# Patient Record
Sex: FEMALE | Race: OTHER | NOT HISPANIC OR LATINO | URBAN - METROPOLITAN AREA
[De-identification: names, ages, dates, MRNs, and addresses within clinical notes are randomized per-mention and may not be internally consistent; named-entity substitution may affect disease eponyms.]

---

## 2020-12-20 ENCOUNTER — INPATIENT (INPATIENT)
Facility: HOSPITAL | Age: 85
LOS: 7 days | Discharge: HOME | DRG: 683 | End: 2020-12-28
Attending: GENERAL PRACTICE | Admitting: GENERAL PRACTICE
Payer: MEDICARE

## 2020-12-20 VITALS — HEART RATE: 87 BPM | HEIGHT: 62 IN | WEIGHT: 110.01 LBS

## 2020-12-20 DIAGNOSIS — E87.5 HYPERKALEMIA: ICD-10-CM

## 2020-12-20 LAB
ALBUMIN SERPL ELPH-MCNC: 3.8 G/DL — SIGNIFICANT CHANGE UP (ref 3.3–5)
ALBUMIN SERPL ELPH-MCNC: 4.4 G/DL — SIGNIFICANT CHANGE UP (ref 3.3–5)
ALP SERPL-CCNC: 47 U/L — SIGNIFICANT CHANGE UP (ref 40–120)
ALP SERPL-CCNC: 52 U/L — SIGNIFICANT CHANGE UP (ref 40–120)
ALT FLD-CCNC: 128 U/L — HIGH (ref 10–45)
ALT FLD-CCNC: 190 U/L — HIGH (ref 10–45)
ANION GAP SERPL CALC-SCNC: 15 MMOL/L — SIGNIFICANT CHANGE UP (ref 5–17)
ANION GAP SERPL CALC-SCNC: 17 MMOL/L — SIGNIFICANT CHANGE UP (ref 5–17)
ANION GAP SERPL CALC-SCNC: 17 MMOL/L — SIGNIFICANT CHANGE UP (ref 5–17)
ANION GAP SERPL CALC-SCNC: 18 MMOL/L — HIGH (ref 5–17)
APPEARANCE UR: ABNORMAL
AST SERPL-CCNC: 122 U/L — HIGH (ref 10–40)
AST SERPL-CCNC: 178 U/L — HIGH (ref 10–40)
BACTERIA # UR AUTO: NEGATIVE — SIGNIFICANT CHANGE UP
BASE EXCESS BLDV CALC-SCNC: -5.7 MMOL/L — LOW (ref -2–2)
BASOPHILS # BLD AUTO: 0 K/UL — SIGNIFICANT CHANGE UP (ref 0–0.2)
BASOPHILS NFR BLD AUTO: 0 % — SIGNIFICANT CHANGE UP (ref 0–2)
BILIRUB SERPL-MCNC: 1 MG/DL — SIGNIFICANT CHANGE UP (ref 0.2–1.2)
BILIRUB SERPL-MCNC: 1.3 MG/DL — HIGH (ref 0.2–1.2)
BILIRUB UR-MCNC: NEGATIVE — SIGNIFICANT CHANGE UP
BUN SERPL-MCNC: 66 MG/DL — HIGH (ref 7–23)
BUN SERPL-MCNC: 67 MG/DL — HIGH (ref 7–23)
BUN SERPL-MCNC: 68 MG/DL — HIGH (ref 7–23)
BUN SERPL-MCNC: 69 MG/DL — HIGH (ref 7–23)
CA-I SERPL-SCNC: 1.12 MMOL/L — SIGNIFICANT CHANGE UP (ref 1.12–1.3)
CALCIUM SERPL-MCNC: 10.4 MG/DL — SIGNIFICANT CHANGE UP (ref 8.4–10.5)
CALCIUM SERPL-MCNC: 9.3 MG/DL — SIGNIFICANT CHANGE UP (ref 8.4–10.5)
CALCIUM SERPL-MCNC: 9.3 MG/DL — SIGNIFICANT CHANGE UP (ref 8.4–10.5)
CALCIUM SERPL-MCNC: 9.9 MG/DL — SIGNIFICANT CHANGE UP (ref 8.4–10.5)
CHLORIDE BLDV-SCNC: 78 MMOL/L — LOW (ref 96–108)
CHLORIDE SERPL-SCNC: 75 MMOL/L — LOW (ref 96–108)
CHLORIDE SERPL-SCNC: 76 MMOL/L — LOW (ref 96–108)
CHLORIDE SERPL-SCNC: 76 MMOL/L — LOW (ref 96–108)
CHLORIDE SERPL-SCNC: 80 MMOL/L — LOW (ref 96–108)
CO2 BLDV-SCNC: 20 MMOL/L — LOW (ref 22–30)
CO2 SERPL-SCNC: 16 MMOL/L — LOW (ref 22–31)
CO2 SERPL-SCNC: 16 MMOL/L — LOW (ref 22–31)
CO2 SERPL-SCNC: 17 MMOL/L — LOW (ref 22–31)
CO2 SERPL-SCNC: 18 MMOL/L — LOW (ref 22–31)
COLOR SPEC: YELLOW — SIGNIFICANT CHANGE UP
CREAT SERPL-MCNC: 2.69 MG/DL — HIGH (ref 0.5–1.3)
CREAT SERPL-MCNC: 2.81 MG/DL — HIGH (ref 0.5–1.3)
CREAT SERPL-MCNC: 2.85 MG/DL — HIGH (ref 0.5–1.3)
CREAT SERPL-MCNC: 2.86 MG/DL — HIGH (ref 0.5–1.3)
DIFF PNL FLD: NEGATIVE — SIGNIFICANT CHANGE UP
EOSINOPHIL # BLD AUTO: 0 K/UL — SIGNIFICANT CHANGE UP (ref 0–0.5)
EOSINOPHIL NFR BLD AUTO: 0 % — SIGNIFICANT CHANGE UP (ref 0–6)
EPI CELLS # UR: 3 /HPF — SIGNIFICANT CHANGE UP
GAS PNL BLDV: 106 MMOL/L — CRITICAL LOW (ref 135–145)
GAS PNL BLDV: SIGNIFICANT CHANGE UP
GLUCOSE BLDC GLUCOMTR-MCNC: 184 MG/DL — HIGH (ref 70–99)
GLUCOSE BLDV-MCNC: 197 MG/DL — HIGH (ref 70–99)
GLUCOSE SERPL-MCNC: 171 MG/DL — HIGH (ref 70–99)
GLUCOSE SERPL-MCNC: 175 MG/DL — HIGH (ref 70–99)
GLUCOSE SERPL-MCNC: 210 MG/DL — HIGH (ref 70–99)
GLUCOSE SERPL-MCNC: 231 MG/DL — HIGH (ref 70–99)
GLUCOSE UR QL: NEGATIVE — SIGNIFICANT CHANGE UP
HCO3 BLDV-SCNC: 19 MMOL/L — LOW (ref 21–29)
HCT VFR BLD CALC: 23.7 % — LOW (ref 34.5–45)
HCT VFR BLDA CALC: 27 % — LOW (ref 39–50)
HGB BLD CALC-MCNC: 8.6 G/DL — LOW (ref 11.5–15.5)
HGB BLD-MCNC: 8.5 G/DL — LOW (ref 11.5–15.5)
HYALINE CASTS # UR AUTO: 10 /LPF — HIGH (ref 0–2)
KETONES UR-MCNC: NEGATIVE — SIGNIFICANT CHANGE UP
LACTATE BLDV-MCNC: 2.9 MMOL/L — HIGH (ref 0.7–2)
LEUKOCYTE ESTERASE UR-ACNC: NEGATIVE — SIGNIFICANT CHANGE UP
LIDOCAIN IGE QN: 119 U/L — HIGH (ref 7–60)
LYMPHOCYTES # BLD AUTO: 0.64 K/UL — LOW (ref 1–3.3)
LYMPHOCYTES # BLD AUTO: 12.2 % — LOW (ref 13–44)
MAGNESIUM SERPL-MCNC: 2.3 MG/DL — SIGNIFICANT CHANGE UP (ref 1.6–2.6)
MCHC RBC-ENTMCNC: 33.6 PG — SIGNIFICANT CHANGE UP (ref 27–34)
MCHC RBC-ENTMCNC: 35.9 GM/DL — SIGNIFICANT CHANGE UP (ref 32–36)
MCV RBC AUTO: 93.7 FL — SIGNIFICANT CHANGE UP (ref 80–100)
MONOCYTES # BLD AUTO: 0.23 K/UL — SIGNIFICANT CHANGE UP (ref 0–0.9)
MONOCYTES NFR BLD AUTO: 4.4 % — SIGNIFICANT CHANGE UP (ref 2–14)
NEUTROPHILS # BLD AUTO: 4.39 K/UL — SIGNIFICANT CHANGE UP (ref 1.8–7.4)
NEUTROPHILS NFR BLD AUTO: 81.7 % — HIGH (ref 43–77)
NITRITE UR-MCNC: NEGATIVE — SIGNIFICANT CHANGE UP
NT-PROBNP SERPL-SCNC: HIGH PG/ML (ref 0–300)
OSMOLALITY UR: 274 MOSM/KG — SIGNIFICANT CHANGE UP (ref 50–1200)
PCO2 BLDV: 38 MMHG — SIGNIFICANT CHANGE UP (ref 35–50)
PH BLDV: 7.32 — LOW (ref 7.35–7.45)
PH UR: 6 — SIGNIFICANT CHANGE UP (ref 5–8)
PHOSPHATE SERPL-MCNC: 5.2 MG/DL — HIGH (ref 2.5–4.5)
PLATELET # BLD AUTO: 82 K/UL — LOW (ref 150–400)
PO2 BLDV: <20 MMHG — LOW (ref 25–45)
POTASSIUM BLDV-SCNC: 6.6 MMOL/L — CRITICAL HIGH (ref 3.5–5.3)
POTASSIUM SERPL-MCNC: 5.5 MMOL/L — HIGH (ref 3.5–5.3)
POTASSIUM SERPL-MCNC: 5.7 MMOL/L — HIGH (ref 3.5–5.3)
POTASSIUM SERPL-MCNC: 6.1 MMOL/L — HIGH (ref 3.5–5.3)
POTASSIUM SERPL-MCNC: 6.9 MMOL/L — CRITICAL HIGH (ref 3.5–5.3)
POTASSIUM SERPL-SCNC: 5.5 MMOL/L — HIGH (ref 3.5–5.3)
POTASSIUM SERPL-SCNC: 5.7 MMOL/L — HIGH (ref 3.5–5.3)
POTASSIUM SERPL-SCNC: 6.1 MMOL/L — HIGH (ref 3.5–5.3)
POTASSIUM SERPL-SCNC: 6.9 MMOL/L — CRITICAL HIGH (ref 3.5–5.3)
PROT SERPL-MCNC: 6 G/DL — SIGNIFICANT CHANGE UP (ref 6–8.3)
PROT SERPL-MCNC: 6.4 G/DL — SIGNIFICANT CHANGE UP (ref 6–8.3)
PROT UR-MCNC: ABNORMAL
RBC # BLD: 2.53 M/UL — LOW (ref 3.8–5.2)
RBC # FLD: 13.3 % — SIGNIFICANT CHANGE UP (ref 10.3–14.5)
RBC CASTS # UR COMP ASSIST: 3 /HPF — SIGNIFICANT CHANGE UP (ref 0–4)
SAO2 % BLDV: 18 % — LOW (ref 67–88)
SARS-COV-2 RNA SPEC QL NAA+PROBE: SIGNIFICANT CHANGE UP
SODIUM SERPL-SCNC: 108 MMOL/L — CRITICAL LOW (ref 135–145)
SODIUM SERPL-SCNC: 108 MMOL/L — CRITICAL LOW (ref 135–145)
SODIUM SERPL-SCNC: 111 MMOL/L — CRITICAL LOW (ref 135–145)
SODIUM SERPL-SCNC: 115 MMOL/L — CRITICAL LOW (ref 135–145)
SODIUM UR-SCNC: <35 MMOL/L — SIGNIFICANT CHANGE UP
SP GR SPEC: 1.01 — SIGNIFICANT CHANGE UP (ref 1.01–1.02)
TROPONIN T, HIGH SENSITIVITY RESULT: 196 NG/L — HIGH (ref 0–51)
UROBILINOGEN FLD QL: NEGATIVE — SIGNIFICANT CHANGE UP
WBC # BLD: 5.26 K/UL — SIGNIFICANT CHANGE UP (ref 3.8–10.5)
WBC # FLD AUTO: 5.26 K/UL — SIGNIFICANT CHANGE UP (ref 3.8–10.5)
WBC UR QL: 0 /HPF — SIGNIFICANT CHANGE UP (ref 0–5)

## 2020-12-20 PROCEDURE — 93010 ELECTROCARDIOGRAM REPORT: CPT | Mod: GC

## 2020-12-20 PROCEDURE — 93010 ELECTROCARDIOGRAM REPORT: CPT

## 2020-12-20 PROCEDURE — 71045 X-RAY EXAM CHEST 1 VIEW: CPT | Mod: 26

## 2020-12-20 PROCEDURE — 99291 CRITICAL CARE FIRST HOUR: CPT | Mod: GC

## 2020-12-20 RX ORDER — DEXTROSE 50 % IN WATER 50 %
12.5 SYRINGE (ML) INTRAVENOUS ONCE
Refills: 0 | Status: DISCONTINUED | OUTPATIENT
Start: 2020-12-20 | End: 2020-12-28

## 2020-12-20 RX ORDER — GLUCAGON INJECTION, SOLUTION 0.5 MG/.1ML
1 INJECTION, SOLUTION SUBCUTANEOUS ONCE
Refills: 0 | Status: DISCONTINUED | OUTPATIENT
Start: 2020-12-20 | End: 2020-12-28

## 2020-12-20 RX ORDER — SODIUM CHLORIDE 5 G/100ML
500 INJECTION, SOLUTION INTRAVENOUS
Refills: 0 | Status: DISCONTINUED | OUTPATIENT
Start: 2020-12-20 | End: 2020-12-20

## 2020-12-20 RX ORDER — SODIUM POLYSTYRENE SULFONATE 4.1 MEQ/G
30 POWDER, FOR SUSPENSION ORAL EVERY 6 HOURS
Refills: 0 | Status: DISCONTINUED | OUTPATIENT
Start: 2020-12-20 | End: 2020-12-20

## 2020-12-20 RX ORDER — SODIUM CHLORIDE 9 MG/ML
1000 INJECTION INTRAMUSCULAR; INTRAVENOUS; SUBCUTANEOUS
Refills: 0 | Status: DISCONTINUED | OUTPATIENT
Start: 2020-12-20 | End: 2020-12-21

## 2020-12-20 RX ORDER — CALCIUM GLUCONATE 100 MG/ML
1 VIAL (ML) INTRAVENOUS ONCE
Refills: 0 | Status: COMPLETED | OUTPATIENT
Start: 2020-12-20 | End: 2020-12-20

## 2020-12-20 RX ORDER — DEXTROSE 50 % IN WATER 50 %
25 SYRINGE (ML) INTRAVENOUS ONCE
Refills: 0 | Status: DISCONTINUED | OUTPATIENT
Start: 2020-12-20 | End: 2020-12-28

## 2020-12-20 RX ORDER — SODIUM ZIRCONIUM CYCLOSILICATE 10 G/10G
10 POWDER, FOR SUSPENSION ORAL ONCE
Refills: 0 | Status: COMPLETED | OUTPATIENT
Start: 2020-12-20 | End: 2020-12-20

## 2020-12-20 RX ORDER — DEXTROSE 50 % IN WATER 50 %
50 SYRINGE (ML) INTRAVENOUS ONCE
Refills: 0 | Status: COMPLETED | OUTPATIENT
Start: 2020-12-20 | End: 2020-12-20

## 2020-12-20 RX ORDER — INSULIN HUMAN 100 [IU]/ML
5 INJECTION, SOLUTION SUBCUTANEOUS ONCE
Refills: 0 | Status: COMPLETED | OUTPATIENT
Start: 2020-12-20 | End: 2020-12-20

## 2020-12-20 RX ORDER — SODIUM CHLORIDE 9 MG/ML
1 INJECTION INTRAMUSCULAR; INTRAVENOUS; SUBCUTANEOUS
Refills: 0 | Status: DISCONTINUED | OUTPATIENT
Start: 2020-12-20 | End: 2020-12-20

## 2020-12-20 RX ORDER — INSULIN HUMAN 100 [IU]/ML
10 INJECTION, SOLUTION SUBCUTANEOUS ONCE
Refills: 0 | Status: COMPLETED | OUTPATIENT
Start: 2020-12-20 | End: 2020-12-20

## 2020-12-20 RX ORDER — SODIUM ZIRCONIUM CYCLOSILICATE 10 G/10G
10 POWDER, FOR SUSPENSION ORAL EVERY 8 HOURS
Refills: 0 | Status: DISCONTINUED | OUTPATIENT
Start: 2020-12-20 | End: 2020-12-20

## 2020-12-20 RX ORDER — SODIUM CHLORIDE 9 MG/ML
1000 INJECTION, SOLUTION INTRAVENOUS
Refills: 0 | Status: DISCONTINUED | OUTPATIENT
Start: 2020-12-20 | End: 2020-12-28

## 2020-12-20 RX ORDER — CARVEDILOL PHOSPHATE 80 MG/1
3.12 CAPSULE, EXTENDED RELEASE ORAL EVERY 12 HOURS
Refills: 0 | Status: DISCONTINUED | OUTPATIENT
Start: 2020-12-20 | End: 2020-12-22

## 2020-12-20 RX ORDER — SODIUM ZIRCONIUM CYCLOSILICATE 10 G/10G
10 POWDER, FOR SUSPENSION ORAL EVERY 8 HOURS
Refills: 0 | Status: COMPLETED | OUTPATIENT
Start: 2020-12-20 | End: 2020-12-21

## 2020-12-20 RX ORDER — HEPARIN SODIUM 5000 [USP'U]/ML
5000 INJECTION INTRAVENOUS; SUBCUTANEOUS EVERY 12 HOURS
Refills: 0 | Status: DISCONTINUED | OUTPATIENT
Start: 2020-12-20 | End: 2020-12-21

## 2020-12-20 RX ORDER — FAMOTIDINE 10 MG/ML
20 INJECTION INTRAVENOUS DAILY
Refills: 0 | Status: DISCONTINUED | OUTPATIENT
Start: 2020-12-20 | End: 2020-12-28

## 2020-12-20 RX ORDER — DEXTROSE 50 % IN WATER 50 %
15 SYRINGE (ML) INTRAVENOUS ONCE
Refills: 0 | Status: DISCONTINUED | OUTPATIENT
Start: 2020-12-20 | End: 2020-12-28

## 2020-12-20 RX ORDER — INSULIN LISPRO 100/ML
VIAL (ML) SUBCUTANEOUS
Refills: 0 | Status: DISCONTINUED | OUTPATIENT
Start: 2020-12-20 | End: 2020-12-28

## 2020-12-20 RX ADMIN — Medication 50 MILLILITER(S): at 19:42

## 2020-12-20 RX ADMIN — SODIUM CHLORIDE 60 MILLILITER(S): 9 INJECTION INTRAMUSCULAR; INTRAVENOUS; SUBCUTANEOUS at 23:27

## 2020-12-20 RX ADMIN — Medication 100 GRAM(S): at 11:33

## 2020-12-20 RX ADMIN — INSULIN HUMAN 5 UNIT(S): 100 INJECTION, SOLUTION SUBCUTANEOUS at 11:33

## 2020-12-20 RX ADMIN — SODIUM CHLORIDE 1 GRAM(S): 9 INJECTION INTRAMUSCULAR; INTRAVENOUS; SUBCUTANEOUS at 18:38

## 2020-12-20 RX ADMIN — SODIUM ZIRCONIUM CYCLOSILICATE 10 GRAM(S): 10 POWDER, FOR SUSPENSION ORAL at 11:46

## 2020-12-20 RX ADMIN — SODIUM ZIRCONIUM CYCLOSILICATE 10 GRAM(S): 10 POWDER, FOR SUSPENSION ORAL at 20:46

## 2020-12-20 RX ADMIN — Medication 100 GRAM(S): at 19:43

## 2020-12-20 RX ADMIN — Medication 50 MILLILITER(S): at 11:33

## 2020-12-20 RX ADMIN — SODIUM CHLORIDE 100 MILLILITER(S): 5 INJECTION, SOLUTION INTRAVENOUS at 16:35

## 2020-12-20 RX ADMIN — INSULIN HUMAN 10 UNIT(S): 100 INJECTION, SOLUTION SUBCUTANEOUS at 19:42

## 2020-12-20 RX ADMIN — Medication 100 GRAM(S): at 14:57

## 2020-12-20 NOTE — ED ADULT NURSE NOTE - OBJECTIVE STATEMENT
PT presents via EMS sent by family for weakness and lethargy at home, per EMS pt bradycardic, recd atropine 0.5 mg en route, Mandarin speaking,  [id number] used , pt had difficulty using translation service, pt [place], per family pt can only hear with R ear. Per family, pt has been reporting chest pain, pt has been taking nitro tablets at home with no relief. Primary history gained from family via phone,  Pt bradycardi on arrival on telemetry monitor, junctional rythym noted, pt denies pain at this time, breathing unlabored, symmetrical, no shortness of breath reported. No fevers or chills per family. Moving all extremities.

## 2020-12-20 NOTE — ED PROVIDER NOTE - CARE PLAN
Principal Discharge DX:	Hyperkalemia  Secondary Diagnosis:	Elevated troponin  Secondary Diagnosis:	Hyponatremia

## 2020-12-20 NOTE — H&P ADULT - PROBLEM SELECTOR PLAN 2
as above, improved post treatment   holding diuretics  hold ARB for now  nephrology consulted, pending  encourage PO hydration  monitor BMP closely

## 2020-12-20 NOTE — CONSULT NOTE ADULT - SUBJECTIVE AND OBJECTIVE BOX
CHIEF COMPLAINT:     HPI:  89 yo Mandarin Speaking Female with history of CHF, CKD III, DM, HTN BIBEMS from home for decreased lethargy, po intake, slower to respond and anuria since yesterday found to be bradycardic with junctional rhthym given atropine with adequate response. Patient found to be hyponatremic to 108, MICU consulted for severe hyponatremia. Patient with stable blood pressure with HR 53, /46,     PAST MEDICAL & SURGICAL HISTORY:      FAMILY HISTORY:      SOCIAL HISTORY:  Smoking: __ packs x ___ years  EtOH Use:  Marital Status:  Occupation:  Recent Travel:  Country of Birth:  Advance Directives:    Allergies    No Known Allergies    Intolerances        HOME MEDICATIONS:    REVIEW OF SYSTEMS:  Constitutional:   Eyes:  ENT:  CV:  Resp:  GI:  :  MSK:  Integumentary:  Neurological:  Psychiatric:  Endocrine:  Hematologic/Lymphatic:  Allergic/Immunologic:  [ ] All other systems negative  [ ] Unable to assess ROS because ________    OBJECTIVE:  ICU Vital Signs Last 24 Hrs  T(C): 36.9 (20 Dec 2020 11:02), Max: 36.9 (20 Dec 2020 11:02)  T(F): 98.5 (20 Dec 2020 11:02), Max: 98.5 (20 Dec 2020 11:02)  HR: 54 (20 Dec 2020 11:41) (50 - 87)  BP: 100/54 (20 Dec 2020 11:41) (100/54 - 151/48)  BP(mean): --  ABP: --  ABP(mean): --  RR: 19 (20 Dec 2020 11:41) (19 - 20)  SpO2: 99% (20 Dec 2020 11:41) (99% - 100%)        CAPILLARY BLOOD GLUCOSE      POCT Blood Glucose.: 292 mg/dL (20 Dec 2020 12:56)      PHYSICAL EXAM:  General:   HEENT:   Lymph Nodes:  Neck:   Respiratory:   Cardiovascular:   Abdomen:   Extremities:   Skin:   Neurological:  Psychiatry:    HOSPITAL MEDICATIONS:  MEDICATIONS  (STANDING):    MEDICATIONS  (PRN):      LABS:                        8.5    5.26  )-----------( 82       ( 20 Dec 2020 10:52 )             23.7     12-20    108<LL>  |  75<L>  |  66<H>  ----------------------------<  210<H>  6.9<HH>   |  16<L>  |  2.85<H>    Ca    9.3      20 Dec 2020 10:52  Phos  5.2     12-20  Mg     2.3     12-20    TPro  6.4  /  Alb  4.4  /  TBili  1.3<H>  /  DBili  x   /  AST  122<H>  /  ALT  128<H>  /  AlkPhos  52  12-20      Urinalysis Basic - ( 20 Dec 2020 11:50 )    Color: Yellow / Appearance: Slightly Turbid / S.012 / pH: x  Gluc: x / Ketone: Negative  / Bili: Negative / Urobili: Negative   Blood: x / Protein: 30 mg/dL / Nitrite: Negative   Leuk Esterase: Negative / RBC: 3 /hpf / WBC 0 /HPF   Sq Epi: x / Non Sq Epi: 3 /hpf / Bacteria: Negative        Venous Blood Gas:   @ 10:52  7.32/38/<20/19/18  VBG Lactate: 2.9      MICROBIOLOGY:     RADIOLOGY:  [ ] Reviewed and interpreted by me    EKG: CHIEF COMPLAINT:     HPI:  89 yo Mandarin Speaking hard of hearing Female with history of CHF, CKD III, DM, HTN BIBEMS from home for decreased lethargy, po intake, slower to respond and anuria since yesterday found to be bradycardic with junctional rhthym given atropine with adequate response. Patient found to be hyponatremic to 108, MICU consulted for severe hyponatremia. Patient with stable blood pressure with HR 53, /46, in no acute distress, ao3. Discussed with grand son [phone]. Patient is at baseline is largely quiet and only speaks when asked questions, she is usually able to ambulate with walker, able to take herself to the bathroom, lives with daughter and grandson, no previous seizure like activity or recent falls, or changes in medications. Patient has been on heavily restricted salt diet although not eating much at baseline. Patient was not reporting chest pain, , no cough, nausea, vomiting, diarrhea, dysuria, leg swelling, history of thyroid dysfxn. Denies recent sick contacts. Patient of note on glipizide, tradjenta, rampril, carvedill, spironolactone, torsemide, famotidine , nitroglycerin as needed    After long discussion with grandson patient is reportedly DNR/DNI and although open to being treated would not want invasive procedures such as central line placement done at this time. Unknown if [facility] completed    cardiologist and PCP office closed and despite calling on call physician unable to obtain patient's prior records    PAST MEDICAL & SURGICAL HISTORY: remote history of ankle surgery      FAMILY HISTORY: heart disease      SOCIAL HISTORY:  Smoking: none  EtOH Use: none  Marital Status:  Occupation:  Recent Travel:  Country of Birth:  Advance Directives:    Allergies    No Known Allergies    Intolerances        HOME MEDICATIONS: glipizide, tradjenta, rampril, carvedill, spironolactone, torsemide, famotidine , nitroglycerin as needed    REVIEW OF SYSTEMS:  see hpi    OBJECTIVE:  ICU Vital Signs Last 24 Hrs  T(C): 36.9 (20 Dec 2020 11:02), Max: 36.9 (20 Dec 2020 11:02)  T(F): 98.5 (20 Dec 2020 11:02), Max: 98.5 (20 Dec 2020 11:02)  HR: 54 (20 Dec 2020 11:41) (50 - 87)  BP: 100/54 (20 Dec 2020 11:41) (100/54 - 151/48)  BP(mean): --  ABP: --  ABP(mean): --  RR: 19 (20 Dec 2020 11:41) (19 - 20)  SpO2: 99% (20 Dec 2020 11:41) (99% - 100%)        CAPILLARY BLOOD GLUCOSE      POCT Blood Glucose.: 292 mg/dL (20 Dec 2020 12:56)      PHYSICAL EXAM:  GENERAL: NAD, lying comfortably in bed   HEAD:  Atraumatic, Normocephalic  EYES: EOMI b/l, PERRLA b/l, conjunctiva and sclera clear  NECK: Supple, No JVD, No LAD   CHEST/LUNG: Clear to auscultation bilaterally; No wheeze or ronchi  HEART: Regular rate and rhythm; S1 and S2 present, No murmurs, rubs, or gallops  ABDOMEN: Soft, Nontender, Nondistended; Bowel sounds present  EXTREMITIES:  2+ Peripheral Pulses, No clubbing, cyanosis, or edema  NEURO: AAOx3, non-focal   SKIN: No rashes or lesions      HOSPITAL MEDICATIONS:  MEDICATIONS  (STANDING):    MEDICATIONS  (PRN):      LABS:                        8.5    5.26  )-----------( 82       ( 20 Dec 2020 10:52 )             23.7     12-20    108<LL>  |  75<L>  |  66<H>  ----------------------------<  210<H>  6.9<HH>   |  16<L>  |  2.85<H>    Ca    9.3      20 Dec 2020 10:52  Phos  5.2     12-20  Mg     2.3     12-20    TPro  6.4  /  Alb  4.4  /  TBili  1.3<H>  /  DBili  x   /  AST  122<H>  /  ALT  128<H>  /  AlkPhos  52  12-20      Urinalysis Basic - ( 20 Dec 2020 11:50 )    Color: Yellow / Appearance: Slightly Turbid / S.012 / pH: x  Gluc: x / Ketone: Negative  / Bili: Negative / Urobili: Negative   Blood: x / Protein: 30 mg/dL / Nitrite: Negative   Leuk Esterase: Negative / RBC: 3 /hpf / WBC 0 /HPF   Sq Epi: x / Non Sq Epi: 3 /hpf / Bacteria: Negative        Venous Blood Gas:  12-20 @ 10:52  7.32/38/<20/19/18  VBG Lactate: 2.9      MICROBIOLOGY:     RADIOLOGY:  [ ] Reviewed and interpreted by me    EKG: wide qrs, 53 bpm, LBBB

## 2020-12-20 NOTE — ED ADULT TRIAGE NOTE - CHIEF COMPLAINT QUOTE
bradycardia, given atropine en route bradycardia, given atropine en route by EMS. Pt brought directly to cc 26. bradycardia to 40's, given atropine en route by EMS. Pt brought directly to cc 26.

## 2020-12-20 NOTE — ED PROVIDER NOTE - ATTENDING CONTRIBUTION TO CARE
pt is 89 yrs old female from home biba mandarin speaking heard of hearing at baseline mental status, but weakness for the past few days, not making urine, sob, family concerned for chf? givern her furosemide and ntg?  no cp or back pain, no fever or chills, ekg with sb with lbbb, on cr monitor, solis placement, ivf, cardiac work up, infectious and metabolic work up.

## 2020-12-20 NOTE — H&P ADULT - HISTORY OF PRESENT ILLNESS
>>>>>>Medical Attending Initial / Admission note<<<<<<  -------------------------------------------------------------------------------  CHIEF COMPLAINT & HISTORY OF PRESENT ILLNESS:      pt poor historian ( and [place], despite interpretor use) , family not available, info per ED chart   Pt is a 89 y/o woman, reportedly A&OX3, with pmh of ckd, htn, chf, hld, htn, dm, pw bradycardia and lethargy.   reports pt has been complaining of weakness, malaise, mild cp and sob. pt has been receiving diuretics and nitro from family. ems found pt to have bradycardia in 40s, junctional, received .5 atropine w/ improvement to 70s. denies f/c.   per family pt did not have formal MOLST however pts HCP stated to ER pt is dnr/dni ( no order placed: needs to be confirmed...)     --------------------------------------------------------------------------------  PAST MEDICAL HISTORY:    ckd  htn  chf  hld  htn  dm    --------------------------------------------------------------------------------  PAST SURGICAL HISTORY:    none known     --------------------------------------------------------------------------------  FAMILY HISTORY:    N/C     --------------------------------------------------------------------------------  SOCIAL HISTORY:  Alcohol: None reported  Smoking: None reported     --------------------------------------------------------------------------------  ALLERGIES:    [As bellow, reviewed]    --------------------------------------------------------------------------------  MEDICATIONS:   [As bellow, reviewed]    --------------------------------------------------------------------------------  REVIEW OF SYSTEM:    limited ROS  no c/o  asking to turn off the light to go to sleep !     --------------------------------------------------------------------------------  VITAL SIGNS:     Height (cm): 157.5  Weight (kg): 49.9  BMI (kg/m2): 20.1  Vital Signs Last 24 HrsT(C): 36.6 (12-20-20 @ 21:46)  T(F): 97.9 (12-20-20 @ 21:46), Max: 98.5 (12-20-20 @ 11:02)  HR: 72 (12-20-20 @ 21:46) (42 - 87)  BP: 138/77 (12-20-20 @ 21:46)  RR: 18 (12-20-20 @ 21:46) (18 - 20)  SpO2: 98% (12-20-20 @ 21:46) (98% - 100%)      POCT Blood Glucose.: 184 mg/dL (20 Dec 2020 17:26)  POCT Blood Glucose.: 292 mg/dL (20 Dec 2020 12:56)  POCT Blood Glucose.: 213 mg/dL (20 Dec 2020 11:31)  POCT Blood Glucose.: 229 mg/dL (20 Dec 2020 10:26)    --------------------------------------------------------------------------------  PHYSICAL EXAM:    GEN: Aler and awake, NAD , comfortable in bed  HEENT: NCAT, PERRL, MMM, hard of hearing   Neck: supple , no JVD  CVS: S1S2 , regular , No M/R/G appreciated  PULM: CTA B/L,  no W/R/R appreciated  ABD.: soft. non tender, non distended,  bowel sounds present  Extrem: intact pulses , no edema   Derm: No rash , no ecchymoses  PSYCH : normal mood,  no delusion not anxious    --------------------------------------------------------------------------------  LAB AND IMAGING:   [As casey, reviewed]    --------------------------------------------------------------------------------  ASSESSMENT AND PLAN:   [As bellow]    --------------------  Case discussed with RN, NP  ___________________________  H. LETITIA Diaz.  Pager: [phone]

## 2020-12-20 NOTE — H&P ADULT - PROBLEM SELECTOR PLAN 1
BECKY on CKD ( unknown baseline)   pt post solis and IVH  Avoid nephrotoxic medications.  holding diuretics  hold ARB for now  nephrology consulted, pending  encourage PO hydration  monitor BMP closely

## 2020-12-20 NOTE — H&P ADULT - PROBLEM SELECTOR PLAN 6
pt on diuretics      ?h/o CHF    chec echo  holding diuretics and ARB for now   tele  cardio to follow

## 2020-12-20 NOTE — ED PROVIDER NOTE - PROGRESS NOTE DETAILS
sherman pgy3: Patient reassessed, NAD, non-toxic appearing. vss. eval by micu, not candidate at this time. endoresd to dr cartagena. potassium improved. will trend troponin.

## 2020-12-20 NOTE — ED PROVIDER NOTE - OBJECTIVE STATEMENT
89 yo f pmh ckd, htn, chf, hld, htn, dm?, pw bradycardia. mandarin speaking, did not communicate w/  phone. collateral obtained from pts son and daughter (hcp) via phone. reports pt has been complaining of weakness, malaise, mild cp and sob. pt has been receiving diuretics and nitro from family. ems found pt to have bradycardia in 40s, junctional, received .5 atropine w/ improvement to 70s. denies f/c.         daughter: May [phone] 89 yo f pmh ckd, htn, chf, hld, htn, dm?, pw bradycardia. mandarin speaking, did not communicate w/  phone. collateral obtained from pts son and daughter (hcp) via phone. reports pt has been complaining of weakness, malaise, mild cp and sob. pt has been receiving diuretics and nitro from family. ems found pt to have bradycardia in 40s, junctional, received .5 atropine w/ improvement to 70s. denies f/c. per family pt did not have formal MOLST however pts HCP states pt dnr/dni.        daughter: May [phone]

## 2020-12-20 NOTE — PROVIDER CONTACT NOTE (CRITICAL VALUE NOTIFICATION) - ASSESSMENT
pt A&Ox3 [place] mandarin speaking.BP stable no s/s of distress 1.5% sodium chlorine running as ordered.

## 2020-12-20 NOTE — H&P ADULT - PROBLEM SELECTOR PLAN 3
as above: improved post treatment  holding diuretics  hold ARB for now  nephrology consulted, pending  encourage PO hydration  monitor BMP closely

## 2020-12-20 NOTE — ED ADULT NURSE NOTE - NSIMPLEMENTINTERV_GEN_ALL_ED
Implemented All Fall with Harm Risk Interventions:  [place] to call system. Call bell, personal items and telephone within reach. Instruct patient to call for assistance. Room bathroom lighting operational. Non-slip footwear when patient is off stretcher. Physically safe environment: no spills, clutter or unnecessary equipment. Stretcher in lowest position, wheels locked, appropriate side rails in place. Provide visual cue, wrist band, yellow gown, etc. Monitor gait and stability. Monitor for mental status changes and reorient to person, place, and time. Review medications for side effects contributing to fall risk. Reinforce activity limits and safety measures with patient and family. Provide visual clues: red socks.

## 2020-12-20 NOTE — ED PROVIDER NOTE - PHYSICAL EXAMINATION
General: nad  HEENT: EOMI, PERRLA, normal mucosa, normal oropharynx, no lesions on the lips or on oral mucosa, normal external ear  Neck: supple, no lymphadenopathy, full range of motion, no nuchal rigidity  CV: RRR, normal S1 and S2 with no murmur, capillary refill less than two seconds, pp 2+  Resp: lungs CTA b/l, good aeration bilaterally, symmetric chest wall   Abd: non-distended, soft, non-tender  : no CVA tenderness  MSK: full range of motion, no cyanosis, no edema, no clubbing, no immobility  Neuro: CN II-XII grossly intact, muscle strength 5/5 in all extremities, moving all extremities

## 2020-12-20 NOTE — ED ADULT NURSE REASSESSMENT NOTE - NS ED NURSE REASSESS COMMENT FT1
MD Charles aware of pt. HR - 44 and /37. States no interventions at this time. Will cont. to monitor.

## 2020-12-20 NOTE — ED ADULT NURSE REASSESSMENT NOTE - NS ED NURSE REASSESS COMMENT FT1
Pt. HR 42 at this time and /33. MD Charles assessing pt. at this time. States to administer Calcium Gluconate IVPB. No further interventions at this time.

## 2020-12-21 DIAGNOSIS — E11.69 TYPE 2 DIABETES MELLITUS WITH OTHER SPECIFIED COMPLICATION: ICD-10-CM

## 2020-12-21 DIAGNOSIS — E87.1 HYPO-OSMOLALITY AND HYPONATREMIA: ICD-10-CM

## 2020-12-21 DIAGNOSIS — E87.5 HYPERKALEMIA: ICD-10-CM

## 2020-12-21 DIAGNOSIS — I50.9 HEART FAILURE, UNSPECIFIED: ICD-10-CM

## 2020-12-21 DIAGNOSIS — R00.1 BRADYCARDIA, UNSPECIFIED: ICD-10-CM

## 2020-12-21 DIAGNOSIS — N17.9 ACUTE KIDNEY FAILURE, UNSPECIFIED: ICD-10-CM

## 2020-12-21 LAB
A1C WITH ESTIMATED AVERAGE GLUCOSE RESULT: 5.4 % — SIGNIFICANT CHANGE UP (ref 4–5.6)
ALBUMIN SERPL ELPH-MCNC: 3.8 G/DL — SIGNIFICANT CHANGE UP (ref 3.3–5)
ALP SERPL-CCNC: 47 U/L — SIGNIFICANT CHANGE UP (ref 40–120)
ALT FLD-CCNC: 359 U/L — HIGH (ref 10–45)
ANION GAP SERPL CALC-SCNC: 15 MMOL/L — SIGNIFICANT CHANGE UP (ref 5–17)
AST SERPL-CCNC: 305 U/L — HIGH (ref 10–40)
BILIRUB SERPL-MCNC: 0.8 MG/DL — SIGNIFICANT CHANGE UP (ref 0.2–1.2)
BUN SERPL-MCNC: 58 MG/DL — HIGH (ref 7–23)
BUN SERPL-MCNC: 61 MG/DL — HIGH (ref 7–23)
BUN SERPL-MCNC: 63 MG/DL — HIGH (ref 7–23)
CALCIUM SERPL-MCNC: 10 MG/DL — SIGNIFICANT CHANGE UP (ref 8.4–10.5)
CALCIUM SERPL-MCNC: 10.1 MG/DL — SIGNIFICANT CHANGE UP (ref 8.4–10.5)
CALCIUM SERPL-MCNC: 9.7 MG/DL — SIGNIFICANT CHANGE UP (ref 8.4–10.5)
CHLORIDE SERPL-SCNC: 84 MMOL/L — LOW (ref 96–108)
CHLORIDE SERPL-SCNC: 85 MMOL/L — LOW (ref 96–108)
CHLORIDE SERPL-SCNC: 86 MMOL/L — LOW (ref 96–108)
CHOLEST SERPL-MCNC: 124 MG/DL — SIGNIFICANT CHANGE UP
CO2 SERPL-SCNC: 19 MMOL/L — LOW (ref 22–31)
CREAT SERPL-MCNC: 1.9 MG/DL — HIGH (ref 0.5–1.3)
CREAT SERPL-MCNC: 2.2 MG/DL — HIGH (ref 0.5–1.3)
CREAT SERPL-MCNC: 2.51 MG/DL — HIGH (ref 0.5–1.3)
CULTURE RESULTS: NO GROWTH — SIGNIFICANT CHANGE UP
ESTIMATED AVERAGE GLUCOSE: 108 MG/DL — SIGNIFICANT CHANGE UP (ref 68–114)
FERRITIN SERPL-MCNC: 5083 NG/ML — HIGH (ref 15–150)
FOLATE SERPL-MCNC: >20 NG/ML — SIGNIFICANT CHANGE UP
GLUCOSE BLDC GLUCOMTR-MCNC: 125 MG/DL — HIGH (ref 70–99)
GLUCOSE BLDC GLUCOMTR-MCNC: 161 MG/DL — HIGH (ref 70–99)
GLUCOSE BLDC GLUCOMTR-MCNC: 212 MG/DL — HIGH (ref 70–99)
GLUCOSE BLDC GLUCOMTR-MCNC: 255 MG/DL — HIGH (ref 70–99)
GLUCOSE SERPL-MCNC: 143 MG/DL — HIGH (ref 70–99)
GLUCOSE SERPL-MCNC: 173 MG/DL — HIGH (ref 70–99)
GLUCOSE SERPL-MCNC: 97 MG/DL — SIGNIFICANT CHANGE UP (ref 70–99)
HCT VFR BLD CALC: 20.1 % — CRITICAL LOW (ref 34.5–45)
HDLC SERPL-MCNC: 39 MG/DL — LOW
HGB BLD-MCNC: 7.2 G/DL — LOW (ref 11.5–15.5)
IRON SATN MFR SERPL: 188 UG/DL — HIGH (ref 30–160)
IRON SATN MFR SERPL: 72 % — HIGH (ref 14–50)
LACTATE SERPL-SCNC: 1.1 MMOL/L — SIGNIFICANT CHANGE UP (ref 0.7–2)
LIPID PNL WITH DIRECT LDL SERPL: 65 MG/DL — SIGNIFICANT CHANGE UP
MAGNESIUM SERPL-MCNC: 2.1 MG/DL — SIGNIFICANT CHANGE UP (ref 1.6–2.6)
MCHC RBC-ENTMCNC: 33.2 PG — SIGNIFICANT CHANGE UP (ref 27–34)
MCHC RBC-ENTMCNC: 35.8 GM/DL — SIGNIFICANT CHANGE UP (ref 32–36)
MCV RBC AUTO: 92.6 FL — SIGNIFICANT CHANGE UP (ref 80–100)
NON HDL CHOLESTEROL: 86 MG/DL — SIGNIFICANT CHANGE UP
NRBC # BLD: 0 /100 WBCS — SIGNIFICANT CHANGE UP (ref 0–0)
PHOSPHATE SERPL-MCNC: 4.6 MG/DL — HIGH (ref 2.5–4.5)
PLATELET # BLD AUTO: 48 K/UL — LOW (ref 150–400)
POTASSIUM SERPL-MCNC: 4.1 MMOL/L — SIGNIFICANT CHANGE UP (ref 3.5–5.3)
POTASSIUM SERPL-MCNC: 4.4 MMOL/L — SIGNIFICANT CHANGE UP (ref 3.5–5.3)
POTASSIUM SERPL-MCNC: 4.8 MMOL/L — SIGNIFICANT CHANGE UP (ref 3.5–5.3)
POTASSIUM SERPL-SCNC: 4.1 MMOL/L — SIGNIFICANT CHANGE UP (ref 3.5–5.3)
POTASSIUM SERPL-SCNC: 4.4 MMOL/L — SIGNIFICANT CHANGE UP (ref 3.5–5.3)
POTASSIUM SERPL-SCNC: 4.8 MMOL/L — SIGNIFICANT CHANGE UP (ref 3.5–5.3)
PREALB SERPL-MCNC: 24 MG/DL — SIGNIFICANT CHANGE UP (ref 20–40)
PROT SERPL-MCNC: 5.9 G/DL — LOW (ref 6–8.3)
RBC # BLD: 2.17 M/UL — LOW (ref 3.8–5.2)
RBC # FLD: 13.2 % — SIGNIFICANT CHANGE UP (ref 10.3–14.5)
SODIUM SERPL-SCNC: 118 MMOL/L — CRITICAL LOW (ref 135–145)
SODIUM SERPL-SCNC: 119 MMOL/L — CRITICAL LOW (ref 135–145)
SODIUM SERPL-SCNC: 120 MMOL/L — CRITICAL LOW (ref 135–145)
SODIUM UR-SCNC: <35 MMOL/L — SIGNIFICANT CHANGE UP
SPECIMEN SOURCE: SIGNIFICANT CHANGE UP
TIBC SERPL-MCNC: 260 UG/DL — SIGNIFICANT CHANGE UP (ref 220–430)
TRIGL SERPL-MCNC: 103 MG/DL — SIGNIFICANT CHANGE UP
TSH SERPL-MCNC: 4.73 UIU/ML — HIGH (ref 0.27–4.2)
UIBC SERPL-MCNC: 72 UG/DL — LOW (ref 110–370)
VIT B12 SERPL-MCNC: >2000 PG/ML — HIGH (ref 232–1245)
WBC # BLD: 3.07 K/UL — LOW (ref 3.8–10.5)
WBC # FLD AUTO: 3.07 K/UL — LOW (ref 3.8–10.5)

## 2020-12-21 PROCEDURE — 93306 TTE W/DOPPLER COMPLETE: CPT | Mod: 26

## 2020-12-21 PROCEDURE — 76700 US EXAM ABDOM COMPLETE: CPT | Mod: 26

## 2020-12-21 PROCEDURE — 99223 1ST HOSP IP/OBS HIGH 75: CPT | Mod: GC

## 2020-12-21 RX ORDER — SODIUM CHLORIDE 9 MG/ML
1000 INJECTION, SOLUTION INTRAVENOUS
Refills: 0 | Status: DISCONTINUED | OUTPATIENT
Start: 2020-12-21 | End: 2020-12-21

## 2020-12-21 RX ORDER — SODIUM CHLORIDE 9 MG/ML
1000 INJECTION, SOLUTION INTRAVENOUS
Refills: 0 | Status: DISCONTINUED | OUTPATIENT
Start: 2020-12-21 | End: 2020-12-22

## 2020-12-21 RX ADMIN — CARVEDILOL PHOSPHATE 3.12 MILLIGRAM(S): 80 CAPSULE, EXTENDED RELEASE ORAL at 17:29

## 2020-12-21 RX ADMIN — FAMOTIDINE 20 MILLIGRAM(S): 10 INJECTION INTRAVENOUS at 11:57

## 2020-12-21 RX ADMIN — Medication 2: at 14:43

## 2020-12-21 RX ADMIN — Medication 3: at 17:29

## 2020-12-21 RX ADMIN — SODIUM CHLORIDE 200 MILLILITER(S): 9 INJECTION, SOLUTION INTRAVENOUS at 14:43

## 2020-12-21 RX ADMIN — SODIUM ZIRCONIUM CYCLOSILICATE 10 GRAM(S): 10 POWDER, FOR SUSPENSION ORAL at 05:27

## 2020-12-21 RX ADMIN — SODIUM CHLORIDE 100 MILLILITER(S): 9 INJECTION, SOLUTION INTRAVENOUS at 08:33

## 2020-12-21 RX ADMIN — CARVEDILOL PHOSPHATE 3.12 MILLIGRAM(S): 80 CAPSULE, EXTENDED RELEASE ORAL at 05:26

## 2020-12-21 RX ADMIN — HEPARIN SODIUM 5000 UNIT(S): 5000 INJECTION INTRAVENOUS; SUBCUTANEOUS at 05:26

## 2020-12-21 RX ADMIN — Medication 1 TABLET(S): at 11:57

## 2020-12-21 NOTE — PROGRESS NOTE ADULT - ASSESSMENT
_________________________________________________________________________________________  ========>>  M E D I C A L   A T T E N D I N G    F O L L O W  U P  N O T E  <<=========  -----------------------------------------------------------------------------------------------------    - Patient seen and examined by me earlier today.   - In summary,  JEANNA LUNA is a 89y year old woman admitted with lethargy  - Patient today overall doing ok, comfortable, eating OK. asking for lotion ( d/w PCA)..     ==================>> REVIEW OF SYSTEM <<=================    limited ROS as pt [place] and language barrier   GEN: no pain  RESP: no SOB, no cough  CVS: no chest pain  GI: no abdominal pain  : no dysuria  Neuro: no headache    ==================>> PHYSICAL EXAM <<=================    GEN: Alert and responsive, , NAD , comfortable in bed  HEENT: NCAT, PERRL, MMM, hearing intact  Neck: supple , no JVD appreciated  CVS: S1S2 , regular , No M/R/G appreciated   PULM: CTA B/L,  no W/R/R appreciated  ABD.: soft. non tender, non distended,  bowel sounds present  Extrem: intact pulses , no edema      + solis in place and draining clear urine   PSYCH : normal mood,  not anxious      ==================>> MEDICATIONS <<====================    MEDICATIONS  (STANDING):  carvedilol 3.125 milliGRAM(s) Oral every 12 hours  dextrose 40% Gel 15 Gram(s) Oral once  dextrose 5%. 1000 milliLiter(s) (50 mL/Hr) IV Continuous <Continuous>  dextrose 5%. 1000 milliLiter(s) (100 mL/Hr) IV Continuous <Continuous>  dextrose 5%. 1000 milliLiter(s) (100 mL/Hr) IV Continuous <Continuous>  dextrose 50% Injectable 25 Gram(s) IV Push once  dextrose 50% Injectable 12.5 Gram(s) IV Push once  dextrose 50% Injectable 25 Gram(s) IV Push once  famotidine    Tablet 20 milliGRAM(s) Oral daily  glucagon  Injectable 1 milliGRAM(s) IntraMuscular once  insulin lispro (ADMELOG) corrective regimen sliding scale   SubCutaneous three times a day before meals  multivitamin 1 Tablet(s) Oral daily    ___________  Active diet:  Diet, Regular:   Consistent Carbohydrate Evening Snack (CSTCHOSN)  Supplement Feeding Modality:  Oral  Glucerna Shake Cans or Servings Per Day:  1       Frequency:  Daily  Nepro Cans or Servings Per Day:  1       Frequency:  Daily  ___________________    ==================>> VITAL SIGNS <<==================    T(C): 36.5 (20 @ 05:20), Max: 36.6 (20 @ 21:46)  HR: 78 (20 @ 05:20) (42 - 78)  BP: 105/59 (20 @ 05:20) (105/59 - 138/77)  BP(mean): 55 (20 @ 14:57)  RR: 18 (20 @ 07:38) (18 - 20)  SpO2: 99% (20 @ 07:38) (98% - 100%)     POCT Blood Glucose.: 125 mg/dL (21 Dec 2020 08:35)  POCT Blood Glucose.: 184 mg/dL (20 Dec 2020 17:26)  POCT Blood Glucose.: 292 mg/dL (20 Dec 2020 12:56)    I&O's Summary    20 Dec 2020 07:01  -  21 Dec 2020 07:00  --------------------------------------------------------  IN: 480 mL / OUT: 1450 mL / NET: -970 mL     ==================>> LAB AND IMAGING <<==================                        7.2    3.07  )-----------( 48       ( 21 Dec 2020 05:36 )             20.1     WBC count:   3.07 <<== ,  5.26 <<==   Hemoglobin:   7.2 <<==,  8.5 <<==  platelets:  48 <==, 82 <==       12-21    119<LL>  |  85<L>  |  63<H>  ----------------------------<  97  4.8   |  19<L>  |  2.51<H>    Sodium:   119  <==, 115  <==, 111  <==, 108  <==, 108  <==  Creatinine:  2.51  <<==, 2.69  <<==, 2.86  <<==, 2.81  <<==, 2.85  <<==    Ca    10.1      21 Dec 2020 05:36  Phos  4.6     12-  Mg     2.1     12-    TPro  5.9<L>  /  Alb  3.8  /  TBili  0.8  /  DBili  x   /  AST  305<H>  /  ALT  359<H>  /  AlkPhos  47  12-21    Urinalysis Basic - ( 20 Dec 2020 11:50 )  Color: Yellow / Appearance: Slightly Turbid / S.012 / pH: x  Gluc: x / Ketone: Negative  / Bili: Negative / Urobili: Negative   Blood: x / Protein: 30 mg/dL / Nitrite: Negative   Leuk Esterase: Negative / RBC: 3 /hpf / WBC 0 /HPF   Sq Epi: x / Non Sq Epi: 3 /hpf / Bacteria: Negative    TSH:      4.73   (20)           Lipid profile:  (20)     Total: 124     LDL  : (p)     HDL  :39     TG   :103     ___________________________________________________________________________________  ===============>>  A S S E S S M E N T   A N D   P L A N <<===============  ------------------------------------------------------------------------------------------    Pt is a 89 y/o woman, reportedly A&OX3, with pmh of ckd, htn, chf, hld, htn, dm, pw bradycardia and lethargy.   reports pt has been complaining of weakness, malaise, mild cp and sob. pt has been receiving diuretics and nitro from family. ems found pt to have bradycardia in 40s, junctional, received .5 atropine w/ improvement to 70s. denies f/c.     Problem/Plan - 1:  ·  Problem:  BECKY on CKD ( unknown baseline)   pt post solis and IVH with some improvement   Avoid nephrotoxic medications.  holding diuretics  hold ARB for now  nephrology consult apprecaited >> Hold off further IVH  encourage PO hydration  monitor BMP closely.   possible TOV tomorrow if stable     Problem/Plan - 2:  ·  Problem: Hyperkalemia  improved post treatment   holding diuretics  hold ARB for now  nephrology following   encourage PO hydration  monitor BMP closely.     Problem/Plan - 3:  ·  Problem: Hyponatremia.    improved post treatment  holding diuretics  hold ARB for now  nephrology appreciated   encourage PO intake   monitor BMP closely.     Problem/Plan - 4:  ·  Problem: Bradycardia.     improved as potassium improved  monitor HR closely  check echo  tele  check echo  cardio f/u     Problem/Plan - 5:  ·  Problem: Type 2 diabetes mellitus with other specified complication, without long-term current use of insulin.  Plan: hold oral meds  monitor FS  RISS  A1C.     Problem/Plan - 6:  Problem: Congestive heart failure, unspecified HF chronicity, unspecified heart failure type. Plan: pt on diuretics      ?h/o CHF    chec echo  holding diuretics and ARB for now   tele  cardio follow up    Problem/Plan - 7:  Problem: pancytopenia     decreased WBC, HGL and platelets   possibly partly due to dilutional effect ?   likely anemia of chronic disease  no sings of bleeding at  this itm  heparin SQ held for now  monitor closely     Problem/Plan - 8:  Problem: Transaminitis worsening  suspect due to hypoperfusion in setting of bradycardia  check abdominal sono to eval LFTs and Creatinine elevations  monitor  pt asymptomatic    -GI/DVT Prophylaxis per protocol.    venodynes   --------------------------------------------  Case discussed with staff.. called famly, no answered   Education given on findings and plan of care  ___________________________  H. LETITIA Diaz.  Pager: [phone]

## 2020-12-21 NOTE — CHART NOTE - NSCHARTNOTEFT_GEN_A_CORE
Nurse called to inform of critical value. Patient's Na+ levels decreased from 120 to 118 since previous draw.   Patient was observed at beside. Patient was mentating and able to respond to questions about distress. No acute distress was noted.       Vital Signs Last 24 Hrs  T(C): 36.4 (21 Dec 2020 20:40), Max: 36.5 (21 Dec 2020 05:20)  T(F): 97.6 (21 Dec 2020 20:40), Max: 97.7 (21 Dec 2020 05:20)  HR: 74 (21 Dec 2020 20:40) (74 - 87)  BP: 102/66 (21 Dec 2020 20:40) (102/66 - 106/53)  BP(mean): --  RR: 18 (21 Dec 2020 20:40) (18 - 18)  SpO2: 97% (21 Dec 2020 20:40) (97% - 99%)      A&P    -Nephro was consulted on critical value and advised to hold off on IV fluids, resume eating/drinking normally, BMP labs at 3am   - Will continue to monitor patient       Lisandra GALVAN [id number]

## 2020-12-21 NOTE — PROVIDER CONTACT NOTE (OTHER) - RECOMMENDATIONS
As per LOLA Zelaya pt family requesting pt be DNR/DNI; no signed MOLST in chart; NP Nathalie aware- As per NP call RRT if needed

## 2020-12-21 NOTE — CONSULT NOTE ADULT - SUBJECTIVE AND OBJECTIVE BOX
[facility] DIVISION OF KIDNEY DISEASES AND HYPERTENSION -- INITIAL CONSULT NOTE  --------------------------------------------------------------------------------  Júnior Bryant   Nephrology Fellow  Pager NS: [phone]/ [facility]: [phone]  (After 5 pm or on weekends please page the on-call fellow)    HPI: Patient is a 89y old Mandarin only speaking female with a pmhx of HFrEF, HTN, DM, CKD3 who presented yesterday to the hospital with bradycardia, weakness. Discussed with the grandson (shereen), pt has had 4 hospitalizations in the past year for HF exacerbation, 2 in [facility] and 1 in [place]. Has had issues with hyponatremia chronically, blood work done by the hematologist on Sept 8th 2020 showed a Na of 127, K 4.9, Cr of 1.44mg/dl. She sees a hematologist he says for procrit shots. Patient currently awake and alert, attempted to use the  phone to communicate with the patient however due to her being "[employer] and some underlying dementia it was difficult to get a history from her or have her answer questions. She stated that she felt very comfortable, wanted to take a shower as he skin was itchy, denied any CP/SOB, wanted me to speak to her daughter. Discussed code status and overall GOC with the grandson & pt's daughter; they reiterated that the patient is DNR/DNI, no invasive procedures like central lines, dialysis, etc. Would like to focus on the patient's comfort and bringing her home. Shereen states that at home for the last few days she has not been eating as well, they had her on a low Na diet bc that is what they were advised after her recent hospitalization. In addition she was getting getting daily nitro, coreq 3.125 BID, torsemide 10mg QD, and spironlactone 12.5mg QD. On arrival to the hospital she was found to have a BUN/Cr: 66/2.85mg/dl, Na of 108, K of 6.9; also noted to be bradycardic to 40's with /54. Treated with atropine .5mg, lokelma, transceullaring shifting with Ca/Insulin/dextrose. Micu consulted and recommended 1.5% and salt tabs, Na improved to 111. Pt was then placed on IV NS at 60cc/hr with Na improvement to 119 this am. Of note Cr also improved to 2.51mg/dl. Nephrology consulted for hyponatremia management        PAST HISTORY  --------------------------------------------------------------------------------  PAST MEDICAL & SURGICAL HISTORY:    FAMILY HISTORY:    PAST SOCIAL HISTORY:    ALLERGIES & MEDICATIONS  --------------------------------------------------------------------------------  Allergies    No Known Allergies    Intolerances      Standing Inpatient Medications  carvedilol 3.125 milliGRAM(s) Oral every 12 hours  dextrose 40% Gel 15 Gram(s) Oral once  dextrose 5%. 1000 milliLiter(s) IV Continuous <Continuous>  dextrose 5%. 1000 milliLiter(s) IV Continuous <Continuous>  dextrose 5%. 1000 milliLiter(s) IV Continuous <Continuous>  dextrose 50% Injectable 25 Gram(s) IV Push once  dextrose 50% Injectable 12.5 Gram(s) IV Push once  dextrose 50% Injectable 25 Gram(s) IV Push once  famotidine    Tablet 20 milliGRAM(s) Oral daily  glucagon  Injectable 1 milliGRAM(s) IntraMuscular once  heparin   Injectable 5000 Unit(s) SubCutaneous every 12 hours  insulin lispro (ADMELOG) corrective regimen sliding scale   SubCutaneous three times a day before meals  multivitamin 1 Tablet(s) Oral daily    PRN Inpatient Medications      REVIEW OF SYSTEMS  --------------------------------------------------------------------------------  unable to assess      VITALS/PHYSICAL EXAM  --------------------------------------------------------------------------------  T(C): 36.5 (12-21-20 @ 05:20), Max: 36.9 (12-20-20 @ 11:02)  HR: 78 (12-21-20 @ 05:20) (42 - 87)  BP: 105/59 (12-21-20 @ 05:20) (100/54 - 151/48)  RR: 18 (12-21-20 @ 07:38) (18 - 20)  SpO2: 99% (12-21-20 @ 07:38) (98% - 100%)  Wt(kg): --  Height (cm): 157.5 (12-20-20 @ 10:29)  Weight (kg): 49.9 (12-20-20 @ 10:29)  BMI (kg/m2): 20.1 (12-20-20 @ 10:29)  BSA (m2): 1.48 (12-20-20 @ 10:29)      12-20-20 @ 07:01  -  12-21-20 @ 07:00  --------------------------------------------------------  IN: 480 mL / OUT: 1450 mL / NET: -970 mL      Physical Exam:    	Gen: NAD  	HEENT: Anicteric, "[employer]  	Pulm: CTA B/L  	CV: S1S2  	Abd: Soft, +BS   	MSK: No LE edema B/L, excoriations noted on RUE  	Neuro: Awake  	Skin: Warm and dry  	Vascular access:      LABS/STUDIES  --------------------------------------------------------------------------------              7.2    3.07  >-----------<  48       [12-21-20 @ 05:36]              20.1     119  |  85  |  63  ----------------------------<  97      [12-21-20 @ 05:36]  4.8   |  19  |  2.51        Ca     10.1     [12-21-20 @ 05:36]      Mg     2.1     [12-21-20 @ 05:36]      Phos  4.6     [12-21-20 @ 05:36]    TPro  5.9  /  Alb  3.8  /  TBili  0.8  /  DBili  x   /  AST  305  /  ALT  359  /  AlkPhos  47  [12-21-20 @ 05:36]          Creatinine Trend:  SCr 2.51 [12-21 @ 05:36]  SCr 2.69 [12-20 @ 22:18]  SCr 2.86 [12-20 @ 16:22]  SCr 2.81 [12-20 @ 13:21]  SCr 2.85 [12-20 @ 10:52]    Urinalysis - [12-20-20 @ 11:50]      Color Yellow / Appearance Slightly Turbid / SG 1.012 / pH 6.0      Gluc Negative / Ketone Negative  / Bili Negative / Urobili Negative       Blood Negative / Protein 30 mg/dL / Leuk Est Negative / Nitrite Negative      RBC 3 / WBC 0 / Hyaline 10 / Gran  / Sq Epi  / Non Sq Epi 3 / Bacteria Negative    Urine Sodium <35      [12-20-20 @ 16:22]  Urine Osmolality 274      [12-20-20 @ 20:06]

## 2020-12-21 NOTE — CHART NOTE - NSCHARTNOTEFT_GEN_A_CORE
This 89yr old female that is bedbound. This patient will require a hospital bed for discharge to home in order to keep patients head elevated greater than 30 degrees due to multiple medical issues Dx I50.9, CKD3, dm2 .   Pt is not able to make position changes without assistance. Patient is at high risk for aspiration due to multiple medical problems.  Patient is bedbound and cannot make position changes. Pillows and wedges have been tried and failed.  Patient is at risk for skin breakdown.

## 2020-12-21 NOTE — CONSULT NOTE ADULT - SUBJECTIVE AND OBJECTIVE BOX
CHIEF COMPLAINT:Patient is a 89y old  Female who presents with a chief complaint of lethargy, bradycardia, electrolyte abnormalities (20 Dec 2020 19:21)      CHIEF COMPLAINT & HISTORY OF PRESENT ILLNESS:      pt poor historian ( and [place], despite interpretor use) , family not available, info per ED chart   Pt is a 89 y/o woman, reportedly A&OX3, with pmh of ckd, htn, chf, hld, htn, dm, pw bradycardia and lethargy.   reports pt has been complaining of weakness, malaise, mild cp and sob. pt has been receiving diuretics and nitro from family. ems found pt to have bradycardia in 40s, junctional, received .5 atropine w/ improvement to 70s. denies f/c.   per family pt did not have formal MOLST however pts HCP stated to ER pt is dnr/dni ( no order placed: needs to be confirmed...)         PAST MEDICAL & SURGICAL HISTORY:      MEDICATIONS  (STANDING):  carvedilol 3.125 milliGRAM(s) Oral every 12 hours  dextrose 40% Gel 15 Gram(s) Oral once  dextrose 5%. 1000 milliLiter(s) (50 mL/Hr) IV Continuous <Continuous>  dextrose 5%. 1000 milliLiter(s) (100 mL/Hr) IV Continuous <Continuous>  dextrose 5%. 1000 milliLiter(s) (100 mL/Hr) IV Continuous <Continuous>  dextrose 50% Injectable 25 Gram(s) IV Push once  dextrose 50% Injectable 12.5 Gram(s) IV Push once  dextrose 50% Injectable 25 Gram(s) IV Push once  famotidine    Tablet 20 milliGRAM(s) Oral daily  glucagon  Injectable 1 milliGRAM(s) IntraMuscular once  heparin   Injectable 5000 Unit(s) SubCutaneous every 12 hours  insulin lispro (ADMELOG) corrective regimen sliding scale   SubCutaneous three times a day before meals  multivitamin 1 Tablet(s) Oral daily    MEDICATIONS  (PRN):      FAMILY HISTORY:      SOCIAL HISTORY:    [ ] Non-smoker  [ ] Smoker  [ ] Alcohol    Allergies    No Known Allergies    Intolerances    	    REVIEW OF SYSTEMS:  CONSTITUTIONAL: No fever, weight loss, or fatigue  EYES: No eye pain, visual disturbances, or discharge  ENT:  No difficulty hearing, tinnitus, vertigo; No sinus or throat pain  NECK: No pain or stiffness  RESPIRATORY: No cough, wheezing, chills or hemoptysis; + Shortness of Breath  CARDIOVASCULAR: No chest pain, palpitations, passing out, dizziness, or leg swelling  GASTROINTESTINAL: No abdominal or epigastric pain. No nausea, vomiting, or hematemesis; No diarrhea or constipation. No melena or hematochezia.  GENITOURINARY: No dysuria, frequency, hematuria, or incontinence  NEUROLOGICAL: No headaches, memory loss, loss of strength, numbness, or tremors  SKIN: No itching, burning, rashes, or lesions   LYMPH Nodes: No enlarged glands  ENDOCRINE: No heat or cold intolerance; No hair loss  MUSCULOSKELETAL: No joint pain or swelling; No muscle, back, or extremity pain  PSYCHIATRIC: No depression, anxiety, mood swings, or difficulty sleeping  HEME/LYMPH: No easy bruising, or bleeding gums  ALLERGY AND IMMUNOLOGIC: No hives or eczema	    [ ] All others negative	  [ ] Unable to obtain    PHYSICAL EXAM:  T(C): 36.5 (12-21-20 @ 05:20), Max: 36.9 (12-20-20 @ 11:02)  HR: 78 (12-21-20 @ 05:20) (42 - 87)  BP: 105/59 (12-21-20 @ 05:20) (100/54 - 151/48)  RR: 18 (12-21-20 @ 07:38) (18 - 20)  SpO2: 99% (12-21-20 @ 07:38) (98% - 100%)  Wt(kg): --  I&O's Summary    20 Dec 2020 07:01  -  21 Dec 2020 07:00  --------------------------------------------------------  IN: 480 mL / OUT: 1450 mL / NET: -970 mL        Appearance: Normal	  HEENT:   Normal oral mucosa, PERRL, EOMI	  Lymphatic: No lymphadenopathy  Cardiovascular: Normal S1 S2, No JVD, + murmurs, No edema  Respiratory: Lungs clear to auscultation	  Psychiatry: A & O x 3, Mood & affect appropriate  Gastrointestinal:  Soft, Non-tender, + BS	  Skin: No rashes, No ecchymoses, No cyanosis	  Neurologic: Non-focal  Extremities: Normal range of motion, No clubbing, cyanosis or edema  Vascular: Peripheral pulses palpable 2+ bilaterally    TELEMETRY: 	    ECG:  	  RADIOLOGY:  OTHER: 	  	  LABS:	 	    CARDIAC MARKERS:                              7.2    3.07  )-----------( 48       ( 21 Dec 2020 05:36 )             20.1     12-21    119<LL>  |  85<L>  |  63<H>  ----------------------------<  97  4.8   |  19<L>  |  2.51<H>    Ca    10.1      21 Dec 2020 05:36  Phos  4.6     12-21  Mg     2.1     12-21    TPro  5.9<L>  /  Alb  3.8  /  TBili  0.8  /  DBili  x   /  AST  305<H>  /  ALT  359<H>  /  AlkPhos  47  12-21    proBNP: Serum Pro-Brain Natriuretic Peptide: 85315 pg/mL (12-20 @ 10:52)    Lipid Profile:   HgA1c:   TSH:       PREVIOUS DIAGNOSTIC TESTING:     < from: 12 Lead ECG (12.20.20 @ 10:22) >  Diagnosis Line accelerate junctional rhythm  LEFT AXIS DEVIATION  LEFT BUNDLE BRANCH BLOCK  ABNORMAL ECG  NO PREVIOUS ECGS AVAILABLE    < from: Xray Chest 1 View- PORTABLE-Urgent (12.20.20 @ 11:04) >  FINDINGS:  The cardiac silhouette is normal in size. There are no focal consolidations or pleural effusions. The hilar and mediastinal structures appear unremarkable. The osseous structures are intact.    IMPRESSION: No evidence of acute pulmonary disease.

## 2020-12-22 LAB
ALBUMIN SERPL ELPH-MCNC: 3.5 G/DL — SIGNIFICANT CHANGE UP (ref 3.3–5)
ALBUMIN SERPL ELPH-MCNC: 3.6 G/DL — SIGNIFICANT CHANGE UP (ref 3.3–5)
ALP SERPL-CCNC: 45 U/L — SIGNIFICANT CHANGE UP (ref 40–120)
ALP SERPL-CCNC: 47 U/L — SIGNIFICANT CHANGE UP (ref 40–120)
ALT FLD-CCNC: 317 U/L — HIGH (ref 10–45)
ALT FLD-CCNC: 332 U/L — HIGH (ref 10–45)
ANION GAP SERPL CALC-SCNC: 13 MMOL/L — SIGNIFICANT CHANGE UP (ref 5–17)
ANION GAP SERPL CALC-SCNC: 14 MMOL/L — SIGNIFICANT CHANGE UP (ref 5–17)
ANION GAP SERPL CALC-SCNC: 15 MMOL/L — SIGNIFICANT CHANGE UP (ref 5–17)
ANION GAP SERPL CALC-SCNC: 16 MMOL/L — SIGNIFICANT CHANGE UP (ref 5–17)
AST SERPL-CCNC: 186 U/L — HIGH (ref 10–40)
AST SERPL-CCNC: 208 U/L — HIGH (ref 10–40)
BILIRUB SERPL-MCNC: 0.5 MG/DL — SIGNIFICANT CHANGE UP (ref 0.2–1.2)
BILIRUB SERPL-MCNC: 0.6 MG/DL — SIGNIFICANT CHANGE UP (ref 0.2–1.2)
BLD GP AB SCN SERPL QL: NEGATIVE — SIGNIFICANT CHANGE UP
BUN SERPL-MCNC: 52 MG/DL — HIGH (ref 7–23)
BUN SERPL-MCNC: 54 MG/DL — HIGH (ref 7–23)
BUN SERPL-MCNC: 54 MG/DL — HIGH (ref 7–23)
BUN SERPL-MCNC: 56 MG/DL — HIGH (ref 7–23)
CALCIUM SERPL-MCNC: 9.3 MG/DL — SIGNIFICANT CHANGE UP (ref 8.4–10.5)
CALCIUM SERPL-MCNC: 9.4 MG/DL — SIGNIFICANT CHANGE UP (ref 8.4–10.5)
CALCIUM SERPL-MCNC: 9.4 MG/DL — SIGNIFICANT CHANGE UP (ref 8.4–10.5)
CALCIUM SERPL-MCNC: 9.5 MG/DL — SIGNIFICANT CHANGE UP (ref 8.4–10.5)
CHLORIDE SERPL-SCNC: 83 MMOL/L — LOW (ref 96–108)
CHLORIDE SERPL-SCNC: 85 MMOL/L — LOW (ref 96–108)
CHLORIDE SERPL-SCNC: 85 MMOL/L — LOW (ref 96–108)
CHLORIDE SERPL-SCNC: 86 MMOL/L — LOW (ref 96–108)
CO2 SERPL-SCNC: 19 MMOL/L — LOW (ref 22–31)
CO2 SERPL-SCNC: 20 MMOL/L — LOW (ref 22–31)
CREAT SERPL-MCNC: 1.58 MG/DL — HIGH (ref 0.5–1.3)
CREAT SERPL-MCNC: 1.78 MG/DL — HIGH (ref 0.5–1.3)
CREAT SERPL-MCNC: 1.79 MG/DL — HIGH (ref 0.5–1.3)
CREAT SERPL-MCNC: 1.85 MG/DL — HIGH (ref 0.5–1.3)
GLUCOSE BLDC GLUCOMTR-MCNC: 138 MG/DL — HIGH (ref 70–99)
GLUCOSE BLDC GLUCOMTR-MCNC: 171 MG/DL — HIGH (ref 70–99)
GLUCOSE BLDC GLUCOMTR-MCNC: 183 MG/DL — HIGH (ref 70–99)
GLUCOSE BLDC GLUCOMTR-MCNC: 239 MG/DL — HIGH (ref 70–99)
GLUCOSE SERPL-MCNC: 152 MG/DL — HIGH (ref 70–99)
GLUCOSE SERPL-MCNC: 153 MG/DL — HIGH (ref 70–99)
GLUCOSE SERPL-MCNC: 170 MG/DL — HIGH (ref 70–99)
GLUCOSE SERPL-MCNC: 98 MG/DL — SIGNIFICANT CHANGE UP (ref 70–99)
HCT VFR BLD CALC: 18.4 % — CRITICAL LOW (ref 34.5–45)
HCT VFR BLD CALC: 19.4 % — CRITICAL LOW (ref 34.5–45)
HCT VFR BLD CALC: 22.6 % — LOW (ref 34.5–45)
HGB BLD-MCNC: 6.6 G/DL — CRITICAL LOW (ref 11.5–15.5)
HGB BLD-MCNC: 6.9 G/DL — CRITICAL LOW (ref 11.5–15.5)
HGB BLD-MCNC: 8.1 G/DL — LOW (ref 11.5–15.5)
MCHC RBC-ENTMCNC: 33.1 PG — SIGNIFICANT CHANGE UP (ref 27–34)
MCHC RBC-ENTMCNC: 33.7 PG — SIGNIFICANT CHANGE UP (ref 27–34)
MCHC RBC-ENTMCNC: 34 PG — SIGNIFICANT CHANGE UP (ref 27–34)
MCHC RBC-ENTMCNC: 35.6 GM/DL — SIGNIFICANT CHANGE UP (ref 32–36)
MCHC RBC-ENTMCNC: 35.8 GM/DL — SIGNIFICANT CHANGE UP (ref 32–36)
MCHC RBC-ENTMCNC: 35.9 GM/DL — SIGNIFICANT CHANGE UP (ref 32–36)
MCV RBC AUTO: 92.2 FL — SIGNIFICANT CHANGE UP (ref 80–100)
MCV RBC AUTO: 93.9 FL — SIGNIFICANT CHANGE UP (ref 80–100)
MCV RBC AUTO: 95.6 FL — SIGNIFICANT CHANGE UP (ref 80–100)
NRBC # BLD: 0 /100 WBCS — SIGNIFICANT CHANGE UP (ref 0–0)
PLATELET # BLD AUTO: 47 K/UL — LOW (ref 150–400)
PLATELET # BLD AUTO: 48 K/UL — LOW (ref 150–400)
PLATELET # BLD AUTO: 48 K/UL — LOW (ref 150–400)
POTASSIUM SERPL-MCNC: 4.3 MMOL/L — SIGNIFICANT CHANGE UP (ref 3.5–5.3)
POTASSIUM SERPL-MCNC: 4.3 MMOL/L — SIGNIFICANT CHANGE UP (ref 3.5–5.3)
POTASSIUM SERPL-MCNC: 4.4 MMOL/L — SIGNIFICANT CHANGE UP (ref 3.5–5.3)
POTASSIUM SERPL-MCNC: 4.5 MMOL/L — SIGNIFICANT CHANGE UP (ref 3.5–5.3)
POTASSIUM SERPL-SCNC: 4.3 MMOL/L — SIGNIFICANT CHANGE UP (ref 3.5–5.3)
POTASSIUM SERPL-SCNC: 4.3 MMOL/L — SIGNIFICANT CHANGE UP (ref 3.5–5.3)
POTASSIUM SERPL-SCNC: 4.4 MMOL/L — SIGNIFICANT CHANGE UP (ref 3.5–5.3)
POTASSIUM SERPL-SCNC: 4.5 MMOL/L — SIGNIFICANT CHANGE UP (ref 3.5–5.3)
PROT SERPL-MCNC: 5.3 G/DL — LOW (ref 6–8.3)
PROT SERPL-MCNC: 5.4 G/DL — LOW (ref 6–8.3)
RBC # BLD: 1.96 M/UL — LOW (ref 3.8–5.2)
RBC # BLD: 2.03 M/UL — LOW (ref 3.8–5.2)
RBC # BLD: 2.45 M/UL — LOW (ref 3.8–5.2)
RBC # FLD: 13.2 % — SIGNIFICANT CHANGE UP (ref 10.3–14.5)
RBC # FLD: 13.6 % — SIGNIFICANT CHANGE UP (ref 10.3–14.5)
RBC # FLD: 13.6 % — SIGNIFICANT CHANGE UP (ref 10.3–14.5)
RH IG SCN BLD-IMP: POSITIVE — SIGNIFICANT CHANGE UP
RH IG SCN BLD-IMP: POSITIVE — SIGNIFICANT CHANGE UP
SODIUM SERPL-SCNC: 115 MMOL/L — CRITICAL LOW (ref 135–145)
SODIUM SERPL-SCNC: 118 MMOL/L — CRITICAL LOW (ref 135–145)
SODIUM SERPL-SCNC: 119 MMOL/L — CRITICAL LOW (ref 135–145)
SODIUM SERPL-SCNC: 120 MMOL/L — CRITICAL LOW (ref 135–145)
WBC # BLD: 2.74 K/UL — LOW (ref 3.8–10.5)
WBC # BLD: 3.11 K/UL — LOW (ref 3.8–10.5)
WBC # BLD: 3.89 K/UL — SIGNIFICANT CHANGE UP (ref 3.8–10.5)
WBC # FLD AUTO: 2.74 K/UL — LOW (ref 3.8–10.5)
WBC # FLD AUTO: 3.11 K/UL — LOW (ref 3.8–10.5)
WBC # FLD AUTO: 3.89 K/UL — SIGNIFICANT CHANGE UP (ref 3.8–10.5)

## 2020-12-22 PROCEDURE — 99232 SBSQ HOSP IP/OBS MODERATE 35: CPT | Mod: GC

## 2020-12-22 RX ORDER — ERYTHROPOIETIN 10000 [IU]/ML
40000 INJECTION, SOLUTION INTRAVENOUS; SUBCUTANEOUS ONCE
Refills: 0 | Status: COMPLETED | OUTPATIENT
Start: 2020-12-22 | End: 2020-12-22

## 2020-12-22 RX ORDER — SODIUM CHLORIDE 5 G/100ML
500 INJECTION, SOLUTION INTRAVENOUS
Refills: 0 | Status: DISCONTINUED | OUTPATIENT
Start: 2020-12-22 | End: 2020-12-23

## 2020-12-22 RX ADMIN — ERYTHROPOIETIN 40000 UNIT(S): 10000 INJECTION, SOLUTION INTRAVENOUS; SUBCUTANEOUS at 21:32

## 2020-12-22 RX ADMIN — Medication 1: at 17:40

## 2020-12-22 RX ADMIN — Medication 1 TABLET(S): at 12:02

## 2020-12-22 RX ADMIN — FAMOTIDINE 20 MILLIGRAM(S): 10 INJECTION INTRAVENOUS at 12:02

## 2020-12-22 RX ADMIN — CARVEDILOL PHOSPHATE 3.12 MILLIGRAM(S): 80 CAPSULE, EXTENDED RELEASE ORAL at 05:53

## 2020-12-22 RX ADMIN — Medication 2: at 12:03

## 2020-12-22 RX ADMIN — SODIUM CHLORIDE 50 MILLILITER(S): 5 INJECTION, SOLUTION INTRAVENOUS at 20:54

## 2020-12-22 NOTE — PHYSICAL THERAPY INITIAL EVALUATION ADULT - CRITERIA FOR SKILLED THERAPEUTIC INTERVENTIONS
impairments found/functional limitations in following categories/risk reduction/prevention/rehab potential/predicted duration of therapy intervention/anticipated discharge recommendation

## 2020-12-22 NOTE — PROGRESS NOTE ADULT - ATTENDING COMMENTS
CKD3, BECKY, hyponatremia.  [place] rapid sodium correction following saline solutions. Now stabilized and slowly improving  No edema    - TAKE OUT DAMON before she becomes delirious or gets UTI  - Monitor Sodium closely, every 6-12 hours  - Hyperkalemia resolved  - Expectant management of BECKY  - Although patient DNR/DNI, may want to further clarify GOC as most workup may not be indicated or in line with expectations for patient and family; I suspect best to correct acute issues and discharge home (consider as well discussion around do not re-hospitalize, if indicated per GOC)  - Remainder per fellow, will follow; should probably aim to discharge in coming days

## 2020-12-22 NOTE — PHYSICAL THERAPY INITIAL EVALUATION ADULT - PLANNED THERAPY INTERVENTIONS, PT EVAL
stair training; GOAL: Pt will negotiate up & down 5 stairs independently with unilateral HR & least restrictive AD, in 2 weeks./balance training/gait training

## 2020-12-22 NOTE — PROGRESS NOTE ADULT - ASSESSMENT
_________________________________________________________________________________________  ========>>  M E D I C A L   A T T E N D I N G    F O L L O W  U P  N O T E  <<=========  -----------------------------------------------------------------------------------------------------    - Patient seen and examined by me earlier today.   - In summary,  JEANNA LUNA is a 89y year old woman admitted with lethargy  - Patient today overall doing ok, comfortable, eating OK.     ==================>> REVIEW OF SYSTEM <<=================    limited ROS as pt [place] and language barrier  ( pt's Dtr at bedside helping)   GEN: no pain  RESP: no SOB, no cough  CVS: no chest pain  GI: no abdominal pain  : no dysuria  Neuro: no headache    ==================>> PHYSICAL EXAM <<=================    GEN: Alert and responsive, , NAD , comfortable in bed  HEENT: NCAT, PERRL, MMM, hearing intact  Neck: supple , no JVD appreciated  CVS: S1S2 , regular , No M/R/G appreciated   PULM: CTA B/L,  no W/R/R appreciated  ABD.: soft. non tender, non distended,  bowel sounds present  Extrem: intact pulses , no edema      + solis in place and draining clear urine   PSYCH : normal mood,  not anxious      ==================>> MEDICATIONS <<====================    dextrose 40% Gel 15 Gram(s) Oral once  dextrose 5%. 1000 milliLiter(s) IV Continuous <Continuous>  dextrose 5%. 1000 milliLiter(s) IV Continuous <Continuous>  dextrose 50% Injectable 25 Gram(s) IV Push once  dextrose 50% Injectable 12.5 Gram(s) IV Push once  dextrose 50% Injectable 25 Gram(s) IV Push once  epoetin luda-epbx (RETACRIT) Injectable 69397 Unit(s) SubCutaneous once  famotidine    Tablet 20 milliGRAM(s) Oral daily  glucagon  Injectable 1 milliGRAM(s) IntraMuscular once  insulin lispro (ADMELOG) corrective regimen sliding scale   SubCutaneous three times a day before meals  multivitamin 1 Tablet(s) Oral daily    ___________  Active diet:  Diet, Regular:   Consistent Carbohydrate Evening Snack (CSTCHOSN)  1000mL Fluid Restriction ([id number])  Supplement Feeding Modality:  Oral  N[place]ro Cans or Servings Per Day:  3       Frequency:  Three Times a day  ___________________    ==================>> VITAL SIGNS <<==================  Height (cm): 157.5  Weight (kg): 49.9  BMI (kg/m2): 20.1  Vital Signs Last 24 HrsT(C): 36.8 (12-22-20 @ 18:12)  T(F): 98.2 (12-22-20 @ 18:12), Max: 98.4 (12-22-20 @ 12:09)  HR: 84 (12-22-20 @ 18:12) (74 - 84)  BP: 107/61 (12-22-20 @ 18:12)  RR: 18 (12-22-20 @ 18:12) (18 - 18)  SpO2: 99% (12-22-20 @ 18:12) (97% - 100%)      POCT Blood Glucose.: 171 mg/dL (22 Dec 2020 16:54)  POCT Blood Glucose.: 239 mg/dL (22 Dec 2020 11:52)  POCT Blood Glucose.: 138 mg/dL (22 Dec 2020 07:59)  POCT Blood Glucose.: 161 mg/dL (21 Dec 2020 21:50)     ==================>> LAB AND IMAGING <<==================                        6.6    2.74  )-----------( 48       ( 22 Dec 2020 08:51 )             18.4        12-22    x   |  x   |  52<H>  ----------------------------<  170<H>  x    |  20<L>  |  1.58<H>    Ca    9.5      22 Dec 2020 17:44  Phos  4.6     12-21  Mg     2.1     12-21    TPro  5.3<L>  /  Alb  3.5  /  TBili  0.5  /  DBili  x   /  AST  186<H>  /  ALT  317<H>  /  AlkPhos  47  12-22    WBC count:   2.74 <<== ,  3.11 <<== ,  3.07 <<== ,  5.26 <<==   Hemoglobin:   6.6 <<==,  6.9 <<==,  7.2 <<==,  8.5 <<==  platelets:  48 <==, 47 <==, 48 <==, 82 <==    Creatinine:  1.58  <<==, 1.79  <<==, 1.85  <<==, 1.90  <<==, 2.20  <<==, 2.51  <<==  Sodium:   118  <==, 120  <==, 118  <==, 120  <==, 119  <==, 115  <==, 111  <==       AST:          186 <== , 208 <== , 305 <== , 178 <== , 122 <==      ALT:        317  <== , 332  <== , 359  <== , 190  <== , 128  <==      AP:        47  <=, 45  <=, 47  <=, 47  <=, 52  <=     Bili:        0.5  <=, 0.6  <=, 0.8  <=, 1.0  <=, 1.3  <=     < from: TTE with Doppler (w/Cont) (12.21.20 @ 05:10) >  Conclusions:  Endocardial visualization enhanced with intravenous  injection of Ultrasonic Enhancing Agent (Definity).  Severely dilated left ventricle.  Severely reduced left ventricular systolic function. Large  area of apical akinesis.  Severe mitral regurgitation.  Severe pulmonary hypertension.  ------------------------------------------------------------------------  Confirmed on  12/21/2020 - 10:50:30 by Jorge Salas MD,  MARTHAE  < end of copied text >    ___________________________________________________________________________________  ===============>>  A S S E S S M E N T   A N D   P L A N <<===============  ------------------------------------------------------------------------------------------    Pt is a 89 y/o woman, reportedly A&OX3, with pmh of ckd, htn, chf, hld, htn, dm, pw bradycardia and lethargy.   reports pt has been complaining of weakness, malaise, mild cp and sob. pt has been receiving diuretics and nitro from family. ems found pt to have bradycardia in 40s, junctional, received .5 atropine w/ improvement to 70s. denies f/c.     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  holding diuretics  hold ARB for now  nephrology following   encourage PO hydration  monitor BMP closely.   TOV     Problem/Plan - 2:  ·  Problem: Hyperkalemia  improved post treatment   holding diuretics ( pt was on aldactone > educated Dtr not to give)   hold ARB for now  nephrology following   encourage PO hydration  monitor BMP closely.     Problem/Plan - 3:  ·  Problem: Hyponatremia.    improved post treatment  holding diuretics  hold ARB for now  nephrology appreciated   encourage PO intake   monitor BMP closely.     Problem/Plan - 4:  ·  Problem: Bradycardia.     improved as potassium improved  monitor HR closely  tele  echo as above   cardio f/u     Problem/Plan - 5:  ·  Problem: Type 2 diabetes mellitus with other specified complication, without long-term current use of insulin.  Plan: hold oral meds  monitor FS  RISS  A1C.     Problem/Plan - 6:  Problem: pt with chronic systolic HF     Echo as above showing severe chronic syustoic CHF with severe MR and severe pulmonary HTN   will plan to resume diuretics  : currently euvolemic   tele  cardio follow up and mgmt     Problem/Plan - 7:  Problem: pancytopenia     decreased WBC, HGL and platelets   possibly partly due to dilutional effect ?   likely anemia of chronic disease     per pt's Dtr pt receives weekly procrit 40K>> ordered one dose today   no sings of bleeding at  this itm  heparin SQ held for now  monitor closely     Problem/Plan - 8:  Problem: Transaminitis worsening  suspect due to hypoperfusion in setting of bradycardia  check abdominal sono to eval LFTs and Creatinine elevations  monitor  pt asymptomatic    -GI/DVT Prophylaxis per protocol.    venodynes     will need further discussion about Goals of care given overall guarded to poor prognosis    --------------------------------------------  Case discussed with pt's Dtr   Education given on findings and plan of care  ___________________________  HAlyse Diaz D.O.  Pager: [phone]

## 2020-12-22 NOTE — PHYSICAL THERAPY INITIAL EVALUATION ADULT - LIVES WITH, PROFILE
Pt lives with family in home with ~13 stairs to entrance, had to be carried into home. Prior to admission pt ambulating with RW, also has wheel chair in the home. Pt with HHA 35 hrs/week./children

## 2020-12-22 NOTE — PROGRESS NOTE ADULT - ASSESSMENT
89y F with HF, CKD3 p/w BECKY, hyponatremia, hyperkalemia          #Hyponatremia  - Pt with hyponatremia in setting of acute renal failure and inability to excrete free water, Cr on admission was 2.8mg/dl, now down to 1.8mg/dl  - Most recent Na from 3am was 120, recommend to check stat BMP, goal Na for today should be around ~120-124. Would monitor BMP q6hrs today  - Recommend to hold further IVF, as the renal fxn improves her Na will improve with it as she will autodiurese. Goal is to make sure she does not overcorrect. Thus she may require D5W   - No need for fluid restriction at this time  - Do not correct Na more than 6-8meq/24hrs     #BECKY  - Suspect patient likely with pre-renal vs. ATN vs. HF exacerbation (less likely). Clinically does not appear volume overloaded on exam but arrived with bradycardia, hyperkalemia, BECKY on CKD which could have been in setting of excessive medication (coreq/torsemide/bob).  - Scr now improving nicely, down to 1.85mg/dl. On admission Cr noted to be 2.8mg/dl. Baseline per the records the grandson provided was 1.44mg/dl  - Would c/w solis, monitor UOP closely, monitor BMP q 6hrs for today.   - Dose medications to eGFR<20    #Hyperkalemia  - Resolved at this time. bradycardia improved. Hold further spironolactone for now    #Anemia  - check iron studies, will give EPO/IV iron if deficiency noted     89y F with HF, CKD3 p/w BECKY, hyponatremia, hyperkalemia          #Hyponatremia  - Pt with hyponatremia in setting of acute renal failure and inability to excrete free water, Cr on admission was 2.8mg/dl, now down to 1.8mg/dl  - Most recent Na from 3am was 120, recommend to check stat BMP, goal Na for today should be around ~120-124. Would monitor BMP q6hrs today  - Recommend to hold further IVF, as the renal fxn improves her Na will improve with it as she will autodiurese. Goal is to make sure she does not overcorrect. Thus she may require D5W   - No need for fluid restriction at this time  - Do not correct Na more than 6-8meq/24hrs     #BECKY  - Suspect patient likely with pre-renal vs. ATN vs. HF exacerbation (less likely). Clinically does not appear volume overloaded on exam but arrived with bradycardia, hyperkalemia, BECKY on CKD which could have been in setting of excessive medication (coreq/torsemide/bob).  - Scr now improving nicely, down to 1.85mg/dl. On admission Cr noted to be 2.8mg/dl. Baseline per the records the grandson provided was 1.44mg/dl  - Would c/w solis, monitor UOP closely, monitor BMP q 6hrs for today.   - Dose medications to eGFR<20    #Hyperkalemia  - Resolved at this time. bradycardia improved. Hold further spironolactone for now    #Anemia  - Given cardic hx would transfuse pt pRBC  - check iron studies, will give EPO/IV iron if deficiency noted

## 2020-12-22 NOTE — CHART NOTE - NSCHARTNOTEFT_GEN_A_CORE
Called by RN to report critical labs, HGB 6.6, ,119  - Patient seen and examined , denies complaints  - Renal appreciated holding IVF for now   - 1 unit of prbc ordered for today , will repeat , no active signs of bleeding   - Will cont to monitor , bmp q6hrs  - D/w Dr. May

## 2020-12-22 NOTE — CHART NOTE - NSCHARTNOTEFT_GEN_A_CORE
Nurse called to inform of critical value. Patient's Hgb: 6.9 and HCT 19.4.   Pt was observed at bedside, no acute distress was noted.     Vital Signs Last 24 Hrs  T(C): 36.7 (22 Dec 2020 04:36), Max: 36.7 (22 Dec 2020 04:36)  T(F): 98.1 (22 Dec 2020 04:36), Max: 98.1 (22 Dec 2020 04:36)  HR: 77 (22 Dec 2020 04:36) (74 - 87)  BP: 100/46 (22 Dec 2020 04:36) (100/46 - 106/53)  BP(mean): --  RR: 18 (22 Dec 2020 04:36) (18 - 18)  SpO2: 99% (22 Dec 2020 04:36) (97% - 99%)    Event Summary:  - Dr. Diaz was consulted in AM and advised for repeat CBC in AM and T&S if transfusion is necessary  - will continue to monitor and f/u with AM team     Lisandra GALVAN [id number]

## 2020-12-22 NOTE — PHYSICAL THERAPY INITIAL EVALUATION ADULT - ADDITIONAL COMMENTS
12/21/2020 US ABDOMEN: Coarsened echotexture of the liver, may reflect underlying parenchymal disease. Cholelithiasis. Trace ascites. Bilateral pleural effusions. No hydronephrosis.

## 2020-12-22 NOTE — PHYSICAL THERAPY INITIAL EVALUATION ADULT - WEIGHT-BEARING RESTRICTIONS: SIT/STAND, REHAB EVAL
[facility]  Dr. Jovan Zarate Progress Note     Subjective:   Jonathon Macario is a(n) 34year i full weight-bearing

## 2020-12-22 NOTE — PHYSICAL THERAPY INITIAL EVALUATION ADULT - PERTINENT HX OF CURRENT PROBLEM, REHAB EVAL
y/o Mandarin speaking F with PMHx of CHF, CKD3, DM2, HTN BIBEMS for increased lethargy, poor PO Intake, anuria found to be bradycardic with junctional rhythm iven Atropine. Pt also found to be hyponatremic to 109. MICU Consuted for severe hyponatremia. USAbdomen: Coarsened echotexture of the liver, may reflect underlying parenchymal disease. Cholelithiasis. Trace ascites. Bilateral pleural effusions. No hydronephrosis. CXR: (-) 89 y/o Mandarin speaking F with PMHx of CHF, CKD3, DM2, HTN BIBEMS for increased lethargy, poor PO Intake, anuria found to be bradycardic with junctional rhythm iven Atropine. Pt also found to be hyponatremic to 109. MICU Consuted for severe hyponatremia. USAbdomen: Coarsened echotexture of the liver, may reflect underlying parenchymal disease. Cholelithiasis. Trace ascites. Bilateral pleural effusions. No hydronephrosis. CXR: (-)

## 2020-12-22 NOTE — PROVIDER CONTACT NOTE (CRITICAL VALUE NOTIFICATION) - ASSESSMENT
Continue to monitor patient for any bleeding Patient asymptomatic, continue to monitor patient for any bleeding

## 2020-12-23 DIAGNOSIS — R52 PAIN, UNSPECIFIED: ICD-10-CM

## 2020-12-23 DIAGNOSIS — Z51.5 ENCOUNTER FOR PALLIATIVE CARE: ICD-10-CM

## 2020-12-23 DIAGNOSIS — Z71.89 OTHER SPECIFIED COUNSELING: ICD-10-CM

## 2020-12-23 LAB
ANION GAP SERPL CALC-SCNC: 12 MMOL/L — SIGNIFICANT CHANGE UP (ref 5–17)
ANION GAP SERPL CALC-SCNC: 13 MMOL/L — SIGNIFICANT CHANGE UP (ref 5–17)
BUN SERPL-MCNC: 46 MG/DL — HIGH (ref 7–23)
BUN SERPL-MCNC: 49 MG/DL — HIGH (ref 7–23)
CALCIUM SERPL-MCNC: 9.2 MG/DL — SIGNIFICANT CHANGE UP (ref 8.4–10.5)
CALCIUM SERPL-MCNC: 9.4 MG/DL — SIGNIFICANT CHANGE UP (ref 8.4–10.5)
CHLORIDE SERPL-SCNC: 87 MMOL/L — LOW (ref 96–108)
CHLORIDE SERPL-SCNC: 89 MMOL/L — LOW (ref 96–108)
CO2 SERPL-SCNC: 18 MMOL/L — LOW (ref 22–31)
CO2 SERPL-SCNC: 20 MMOL/L — LOW (ref 22–31)
CREAT SERPL-MCNC: 1.53 MG/DL — HIGH (ref 0.5–1.3)
CREAT SERPL-MCNC: 1.57 MG/DL — HIGH (ref 0.5–1.3)
GLUCOSE BLDC GLUCOMTR-MCNC: 142 MG/DL — HIGH (ref 70–99)
GLUCOSE BLDC GLUCOMTR-MCNC: 175 MG/DL — HIGH (ref 70–99)
GLUCOSE BLDC GLUCOMTR-MCNC: 222 MG/DL — HIGH (ref 70–99)
GLUCOSE BLDC GLUCOMTR-MCNC: 239 MG/DL — HIGH (ref 70–99)
GLUCOSE SERPL-MCNC: 119 MG/DL — HIGH (ref 70–99)
GLUCOSE SERPL-MCNC: 142 MG/DL — HIGH (ref 70–99)
HCT VFR BLD CALC: 23 % — LOW (ref 34.5–45)
HGB BLD-MCNC: 8 G/DL — LOW (ref 11.5–15.5)
MAGNESIUM SERPL-MCNC: 1.8 MG/DL — SIGNIFICANT CHANGE UP (ref 1.6–2.6)
MCHC RBC-ENTMCNC: 32.8 PG — SIGNIFICANT CHANGE UP (ref 27–34)
MCHC RBC-ENTMCNC: 34.8 GM/DL — SIGNIFICANT CHANGE UP (ref 32–36)
MCV RBC AUTO: 94.3 FL — SIGNIFICANT CHANGE UP (ref 80–100)
NRBC # BLD: 0 /100 WBCS — SIGNIFICANT CHANGE UP (ref 0–0)
PLATELET # BLD AUTO: 48 K/UL — LOW (ref 150–400)
POTASSIUM SERPL-MCNC: 4.4 MMOL/L — SIGNIFICANT CHANGE UP (ref 3.5–5.3)
POTASSIUM SERPL-MCNC: 4.5 MMOL/L — SIGNIFICANT CHANGE UP (ref 3.5–5.3)
POTASSIUM SERPL-SCNC: 4.4 MMOL/L — SIGNIFICANT CHANGE UP (ref 3.5–5.3)
POTASSIUM SERPL-SCNC: 4.5 MMOL/L — SIGNIFICANT CHANGE UP (ref 3.5–5.3)
RBC # BLD: 2.44 M/UL — LOW (ref 3.8–5.2)
RBC # FLD: 13.7 % — SIGNIFICANT CHANGE UP (ref 10.3–14.5)
SODIUM SERPL-SCNC: 119 MMOL/L — CRITICAL LOW (ref 135–145)
SODIUM SERPL-SCNC: 120 MMOL/L — CRITICAL LOW (ref 135–145)
WBC # BLD: 3.67 K/UL — LOW (ref 3.8–10.5)
WBC # FLD AUTO: 3.67 K/UL — LOW (ref 3.8–10.5)

## 2020-12-23 PROCEDURE — 99223 1ST HOSP IP/OBS HIGH 75: CPT

## 2020-12-23 PROCEDURE — 99232 SBSQ HOSP IP/OBS MODERATE 35: CPT | Mod: GC

## 2020-12-23 PROCEDURE — 99497 ADVNCD CARE PLAN 30 MIN: CPT | Mod: 25

## 2020-12-23 RX ORDER — SODIUM CHLORIDE 5 G/100ML
500 INJECTION, SOLUTION INTRAVENOUS
Refills: 0 | Status: DISCONTINUED | OUTPATIENT
Start: 2020-12-23 | End: 2020-12-23

## 2020-12-23 RX ORDER — ACETAMINOPHEN 500 MG
650 TABLET ORAL EVERY 6 HOURS
Refills: 0 | Status: DISCONTINUED | OUTPATIENT
Start: 2020-12-23 | End: 2020-12-28

## 2020-12-23 RX ORDER — MAGNESIUM SULFATE 500 MG/ML
1 VIAL (ML) INJECTION ONCE
Refills: 0 | Status: COMPLETED | OUTPATIENT
Start: 2020-12-23 | End: 2020-12-23

## 2020-12-23 RX ADMIN — Medication 1 TABLET(S): at 11:41

## 2020-12-23 RX ADMIN — Medication 100 GRAM(S): at 09:41

## 2020-12-23 RX ADMIN — FAMOTIDINE 20 MILLIGRAM(S): 10 INJECTION INTRAVENOUS at 11:41

## 2020-12-23 RX ADMIN — Medication 2: at 17:09

## 2020-12-23 RX ADMIN — Medication 2: at 12:34

## 2020-12-23 NOTE — CONSULT NOTE ADULT - SUBJECTIVE AND OBJECTIVE BOX
HPI: pt poor historian ( and [place], despite interpretor use) , family not available, info per ED chart   Pt is a 89 y/o woman, reportedly A&OX3, with pmh of ckd, htn, chf, hld, htn, dm, pw bradycardia and lethargy.   reports pt has been complaining of weakness, malaise, mild cp and sob. pt has been receiving diuretics and nitro from family. ems found pt to have bradycardia in 40s, junctional, received .5 atropine w/ improvement to 70s. denies f/c.   per family pt did not have formal MOLST however pts HCP stated to ER pt is dnr/dni ( no order placed: needs to be confirmed...)     20: Patient seen and examined at bedside. Daughter, Eve at bedside. Most of history obtained via daughter as patient states that she is too fatigued. Mandarin translation provided by Zina Duenas #[id number]. Provider introduced palliative care team to patient and daughter. Patient states that she had some abdominal pain after eating lunch today. States that she has had this pain before and described it as an "ache". She denied nausea, vomiting, constipation and diarrhea. Pain relieved soon after without intervention. Per daughter, family is aware that patient has limited time due to her multiple medical comorbidities. Goal is for patient to go home eventually. Patient had been living independently since 3/2020 in [place] and when pandemic started, patient moved in with daughter.   - Provider introduced Hospice to daughter who is agreeable to referral.     PAST MEDICAL HISTORY:  ckd  htn  chf  hld  htn  dm    PAST SURGICAL HISTORY:    none known     FAMILY HISTORY:    N/C     --------------------------------------------------------------------------------  SOCIAL HISTORY:  Alcohol: None reported  Smoking: None reported     --------------------------------------------------------------------------------  ALLERGIES:    [As casey, reviewed]    --------------------------------------------------------------------------------  MEDICATIONS:   [As casey, reviewed]    --------------------------------------------------------------------------------  REVIEW OF SYSTEM:    limited ROS  no c/o  asking to turn off the light to go to sleep !     --------------------------------------------------------------------------------  VITAL SIGNS:  Height (cm): 157.5  Weight (kg): 49.9  BMI (kg/m2): 20.1  Vital Signs Last 24 HrsT(C): 36.6 (20 @ 21:46)  T(F): 97.9 (20 @ 21:46), Max: 98.5 (20 @ 11:02)  HR: 72 (20 @ 21:46) (42 - 87)  BP: 138/77 (20 @ 21:46)  RR: 18 (20 @ 21:46) (18 - 20)  SpO2: 98% (20 @ 21:46) (98% - 100%)      POCT Blood Glucose.: 184 mg/dL (20 Dec 2020 17:26)  POCT Blood Glucose.: 292 mg/dL (20 Dec 2020 12:56)  POCT Blood Glucose.: 213 mg/dL (20 Dec 2020 11:31)  POCT Blood Glucose.: 229 mg/dL (20 Dec 2020 10:26)    --------------------------------------------------------------------------------  PHYSICAL EXAM:  Appearance: Normal	  HEENT:   Normal oral mucosa, PERRL, EOMI	  Lymphatic: No lymphadenopathy  Cardiovascular: Normal S1 S2, No JVD, + murmurs, No edema  Respiratory: Lungs clear to auscultation	  Gastrointestinal:  Soft, Non-tender, + BS	  Skin: No rashes, No ecchymoses, No cyanosis	  Neurologic: Non-focal  Extremities: Normal range of motion, No clubbing, cyanosis or edema  Vascular: Peripheral pulses palpable 2+ bilaterally    LAB AND IMAGING:   [As casey, reviewed]    PERTINENT PM/SXH:     FAMILY HISTORY:    ITEMS NOT CHECKED ARE NOT PRESENT    SOCIAL HISTORY:   Significant other/partner[ ]  Children[ x]3 daughters, 1  and 1 in [facility]  Sikhism/Spirituality:  Substance hx:  [ ]   Tobacco hx:  [ ]   Alcohol hx: [ ]   Home Opioid hx:  [ ] I-Stop Reference No: [id number]  Living Situation: [x ]Home  [ ]Long term care  [ ]Rehab [ ]Other    ADVANCE DIRECTIVES:    DNR  Yes    MOLST  [ ]  Living Will  [ ]   DECISION MAKER(s):  [ ] Health Care Proxy(s)  [x ] Surrogate(s)-Eve Durbin [ ] Guardian           Name(s): Phone Number(s):[phone]    BASELINE (I)ADL(s) (prior to admission):  [place]: [ ]Total  [x ] Moderate [ ]Dependent    Allergies    No Known Allergies    Intolerances    MEDICATIONS  (STANDING):  dextrose 40% Gel 15 Gram(s) Oral once  dextrose 5%. 1000 milliLiter(s) (50 mL/Hr) IV Continuous <Continuous>  dextrose 5%. 1000 milliLiter(s) (100 mL/Hr) IV Continuous <Continuous>  dextrose 50% Injectable 25 Gram(s) IV Push once  dextrose 50% Injectable 12.5 Gram(s) IV Push once  dextrose 50% Injectable 25 Gram(s) IV Push once  famotidine    Tablet 20 milliGRAM(s) Oral daily  glucagon  Injectable 1 milliGRAM(s) IntraMuscular once  insulin lispro (ADMELOG) corrective regimen sliding scale   SubCutaneous three times a day before meals  multivitamin 1 Tablet(s) Oral daily    MEDICATIONS  (PRN):  acetaminophen   Tablet .. 650 milliGRAM(s) Oral every 6 hours PRN Mild Pain (1 - 3), Moderate Pain (4 - 6)    PRESENT SYMPTOMS: [ ]Unable to obtain due to poor mentation   Source if other than patient:  [ ]Family   [ ]Team     Pain: [x ]yes [ ]no  QOL impact - ache  Location - abdomen                   Aggravating factors - n/a  Quality - ache  Radiation - none  Timing- after eating  Severity (0-10 scale): did not quantify  Minimal acceptable level (0-10 scale):     CPOT:    https://www.[facility].org/getattachment/[email or web address]/Critical-Care-Pain-Observation-Tool-(CPOT)      PAIN AD Score: 0  http://geriatrictoolkit.[facility]/cog/painad.pdf (press ctrl +  left click to view)    Dyspnea:                           [ ]Mild [ ]Moderate [ ]Severe  Anxiety:                             [ ]Mild [ ]Moderate [ ]Severe  Fatigue:                             [ ]Mild [ x]Moderate [ ]Severe  Nausea:                             [ ]Mild [ ]Moderate [ ]Severe  Loss of appetite:              [ ]Mild [ ]Moderate [ ]Severe  Constipation:                    [ ]Mild [ ]Moderate [ ]Severe    Other Symptoms:  [ ]All other review of systems negative     Palliative Performance Status Version 2:  40   %    http://npcrc.org/files/news/palliative_performance_scale_ppsv2.pdf  PHYSICAL EXAM:  Vital Signs Last 24 Hrs  T(C): 36.8 (23 Dec 2020 13:08), Max: 36.8 (22 Dec 2020 18:12)  T(F): 98.2 (23 Dec 2020 13:08), Max: 98.2 (22 Dec 2020 18:12)  HR: 90 (23 Dec 2020 13:08) (76 - 97)  BP: 108/66 (23 Dec 2020 13:08) (102/62 - 118/66)  RR: 18 (23 Dec 2020 13:08) (18 - 18)  SpO2: 99% (23 Dec 2020 13:08) (97% - 100%)     I&O's Summary    22 Dec 2020 07:01  -  23 Dec 2020 07:00  --------------------------------------------------------  IN: 520 mL / OUT: 1125 mL / NET: -605 mL    23 Dec 2020 07:01  -  23 Dec 2020 15:19  --------------------------------------------------------  IN: 297 mL / OUT: 0 mL / NET: 297 mL      GENERAL:  [x ]Alert  [x ]Oriented x 3 ([place])  [ ]Lethargic  [ ]Cachexia  [ ]Unarousable  [x ]Verbal  [ ]Non-Verbal  Behavioral:   [ ] Anxiety  [ ] Delirium [ ] Agitation [ ] Other  HEENT:  [ x]Normal   [ ]Dry mouth   [ ]ET Tube/Trach  [ ]Oral lesions  PULMONARY:   [ ]Clear [ ]Tachypnea  [ ]Audible excessive secretions   [ ]Rhonchi        [ ]Right [ ]Left [ ]Bilateral  [x ]Crackles        [ ]Right [ ]Left [x ]Bilateral  [ ]Wheezing     [ ]Right [ ]Left [ ]Bilateral  [ ]Diminished breath sounds [ ]right [ ]left [ ]bilateral  CARDIOVASCULAR:    [xRegular [ ]Irregular [ ]Tachy  [ ]Theodore [ ]Murmur [ ]Other  GASTROINTESTINAL:  [ x]Soft  [ ]Distended   [ ]+BS  [ x]Non tender [ ]Tender  [ ]PEG [ ]OGT/ NGT  Last BM: 20  GENITOURINARY:  [ ]Normal [ x] Incontinent   [ ]Oliguria/Anuria   [ ]Alejo  MUSCULOSKELETAL:   [ ]Normal   [ x]Weakness  [ ]Bed/Wheelchair bound [ ]Edema  NEUROLOGIC:   [ x]No focal deficits  [ ]Cognitive impairment  [ ]Dysphagia [ ]Dysarthria [ ]Paresis [ ]Other   SKIN:   [x]Normal    [ ]Rash  [ ]Pressure ulcer(s)       Present on admission [ ]y [ ]n    CRITICAL CARE:  [ ] Shock Present  [ ]Septic [ ]Cardiogenic [ ]Neurologic [ ]Hypovolemic  [ ]  Vasopressors [ ]  Inotropes   [ ]Respiratory failure present [ ]Mechanical ventilation [ ]Non-invasive ventilatory support [ ]High flow  [ ]Acute  [ ]Chronic [ ]Hypoxic  [ ]Hypercarbic [ ]Other  [x ]Other organ failure -renal, heart    LABS:                        8.0    3.67  )-----------( 48       ( 23 Dec 2020 05:40 )             23.0       120<LL>  |  89<L>  |  46<H>  ----------------------------<  119<H>  4.4   |  18<L>  |  1.53<H>    Ca    9.2      23 Dec 2020 05:40  Mg     1.8         TPro  5.3<L>  /  Alb  3.5  /  TBili  0.5  /  DBili  x   /  AST  186<H>  /  ALT  317<H>  /  AlkPhos  47          RADIOLOGY & ADDITIONAL STUDIES:  < from: US Abdomen Complete (US Abdomen Complete .) (20 @ 13:50) >  Coarsened echotexture of the liver, may reflect underlying parenchymal disease.    Cholelithiasis.    Trace ascites. Bilateral pleural effusions.    No hydronephrosis.    PROTEIN CALORIE MALNUTRITION PRESENT: [ ]mild [ ]moderate [ ]severe [ ]underweight [ ]morbid obesity  https://www.andeal.org/vault/2440/web/files/ONC/Table_Clinical%20Characteristics%20to%20Document%20Malnutrition-White%20JV%20et%20al%2020.pdf    Height (cm): 157.5 (20 @ 10:29)  Weight (kg): 49.9 (20 @ 10:29)  BMI (kg/m2): 20.1 (20 @ 10:29)    [ ]PPSV2 < or = to 30% [ ]significant weight loss  [ ]poor nutritional intake  [ ]anasarca     Albumin, Serum: 3.5 g/dL (20 @ 08:51)   [ ]Artificial Nutrition      REFERRALS:   [ ]Chaplaincy  [ x]Hospice  [ ]Child Life  [x ]Social Work  [x ]Case management [ ]Holistic Therapy     Goals of Care Document: RY     HPI: pt poor historian ( and [place], despite interpretor use) , family not available, info per ED chart   Pt is a 89 y/o woman, reportedly A&OX3, with pmh of ckd, htn, chf, hld, htn, dm, pw bradycardia and lethargy.   reports pt has been complaining of weakness, malaise, mild cp and sob. pt has been receiving diuretics and nitro from family. ems found pt to have bradycardia in 40s, junctional, received .5 atropine w/ improvement to 70s. denies f/c.   per family pt did not have formal MOLST however pts HCP stated to ER pt is dnr/dni ( no order placed: needs to be confirmed...)     20: Patient seen and examined at bedside. Daughter, Eve at bedside. Most of history obtained via daughter as patient states that she is too fatigued. Mandarin translation provided by Zina Duenas #[id number]. Provider introduced palliative care team to patient and daughter. Patient states that she had some abdominal pain after eating lunch today. States that she has had this pain before and described it as an "ache". She denied nausea, vomiting, constipation and diarrhea. Pain relieved soon after without intervention. Per daughter, family is aware that patient has limited time due to her multiple medical comorbidities. Goal is for patient to go home eventually. Patient had been living independently since 3/2020 in [place] and when pandemic started, patient moved in with daughter.   - Provider introduced Hospice to daughter who is agreeable to referral.     PAST MEDICAL HISTORY:  ckd  htn  chf  hld  htn  dm    PAST SURGICAL HISTORY:    none known     FAMILY HISTORY:    N/C     --------------------------------------------------------------------------------  SOCIAL HISTORY:  Alcohol: None reported  Smoking: None reported     --------------------------------------------------------------------------------  ALLERGIES:    [As casey, reviewed]    --------------------------------------------------------------------------------  MEDICATIONS:   [As casey, reviewed]    --------------------------------------------------------------------------------  REVIEW OF SYSTEM:    limited ROS  no c/o  asking to turn off the light to go to sleep !     --------------------------------------------------------------------------------  VITAL SIGNS:  Height (cm): 157.5  Weight (kg): 49.9  BMI (kg/m2): 20.1  Vital Signs Last 24 HrsT(C): 36.6 (20 @ 21:46)  T(F): 97.9 (20 @ 21:46), Max: 98.5 (20 @ 11:02)  HR: 72 (20 @ 21:46) (42 - 87)  BP: 138/77 (20 @ 21:46)  RR: 18 (20 @ 21:46) (18 - 20)  SpO2: 98% (20 @ 21:46) (98% - 100%)      POCT Blood Glucose.: 184 mg/dL (20 Dec 2020 17:26)  POCT Blood Glucose.: 292 mg/dL (20 Dec 2020 12:56)  POCT Blood Glucose.: 213 mg/dL (20 Dec 2020 11:31)  POCT Blood Glucose.: 229 mg/dL (20 Dec 2020 10:26)    --------------------------------------------------------------------------------  PHYSICAL EXAM:  Appearance: Normal	  HEENT:   Normal oral mucosa, PERRL, EOMI	  Lymphatic: No lymphadenopathy  Cardiovascular: Normal S1 S2, No JVD, + murmurs, No edema  Respiratory: Lungs clear to auscultation	  Gastrointestinal:  Soft, Non-tender, + BS	  Skin: No rashes, No ecchymoses, No cyanosis	  Neurologic: Non-focal  Extremities: Normal range of motion, No clubbing, cyanosis or edema  Vascular: Peripheral pulses palpable 2+ bilaterally    LAB AND IMAGING:   [As casey, reviewed]    PERTINENT PM/SXH:     FAMILY HISTORY:    ITEMS NOT CHECKED ARE NOT PRESENT    SOCIAL HISTORY:   Significant other/partner[ ]  Children[ x]3 daughters, 1  and 1 in [facility]  Caodaism/Spirituality:  Substance hx:  [ ]   Tobacco hx:  [ ]   Alcohol hx: [ ]   Home Opioid hx:  [ ] I-Stop Reference No: [id number]  Living Situation: [x ]Home  [ ]Long term care  [ ]Rehab [ ]Other    ADVANCE DIRECTIVES:    DNR  Yes    MOLST  [ ]  Living Will  [ ]   DECISION MAKER(s):  [ ] Health Care Proxy(s)  [x ] Surrogate(s)-Eve Durbin [ ] Guardian           Name(s): Phone Number(s):[phone]    BASELINE (I)ADL(s) (prior to admission):  [place]: [ ]Total  [x ] Moderate [ ]Dependent    Allergies    No Known Allergies    Intolerances    MEDICATIONS  (STANDING):  dextrose 40% Gel 15 Gram(s) Oral once  dextrose 5%. 1000 milliLiter(s) (50 mL/Hr) IV Continuous <Continuous>  dextrose 5%. 1000 milliLiter(s) (100 mL/Hr) IV Continuous <Continuous>  dextrose 50% Injectable 25 Gram(s) IV Push once  dextrose 50% Injectable 12.5 Gram(s) IV Push once  dextrose 50% Injectable 25 Gram(s) IV Push once  famotidine    Tablet 20 milliGRAM(s) Oral daily  glucagon  Injectable 1 milliGRAM(s) IntraMuscular once  insulin lispro (ADMELOG) corrective regimen sliding scale   SubCutaneous three times a day before meals  multivitamin 1 Tablet(s) Oral daily    MEDICATIONS  (PRN):  acetaminophen   Tablet .. 650 milliGRAM(s) Oral every 6 hours PRN Mild Pain (1 - 3), Moderate Pain (4 - 6)    PRESENT SYMPTOMS: [ ]Unable to obtain due to poor mentation   Source if other than patient:  [ ]Family   [ ]Team     Pain: [x ]yes [ ]no  QOL impact - ache  Location - abdomen                   Aggravating factors - n/a  Quality - ache  Radiation - none  Timing- after eating  Severity (0-10 scale): did not quantify  Minimal acceptable level (0-10 scale):     CPOT:    https://www.[facility].org/getattachment/[email or web address]/Critical-Care-Pain-Observation-Tool-(CPOT)      PAIN AD Score: 0  http://geriatrictoolkit.[facility]/cog/painad.pdf (press ctrl +  left click to view)    Dyspnea:                           [ ]Mild [ ]Moderate [ ]Severe  Anxiety:                             [ ]Mild [ ]Moderate [ ]Severe  Fatigue:                             [ ]Mild [ x]Moderate [ ]Severe  Nausea:                             [ ]Mild [ ]Moderate [ ]Severe  Loss of appetite:              [ ]Mild [ ]Moderate [ ]Severe  Constipation:                    [ ]Mild [ ]Moderate [ ]Severe    Other Symptoms:  [ ]All other review of systems negative     Palliative Performance Status Version 2:  40   %    http://npcrc.org/files/news/palliative_performance_scale_ppsv2.pdf  PHYSICAL EXAM:  Vital Signs Last 24 Hrs  T(C): 36.8 (23 Dec 2020 13:08), Max: 36.8 (22 Dec 2020 18:12)  T(F): 98.2 (23 Dec 2020 13:08), Max: 98.2 (22 Dec 2020 18:12)  HR: 90 (23 Dec 2020 13:08) (76 - 97)  BP: 108/66 (23 Dec 2020 13:08) (102/62 - 118/66)  RR: 18 (23 Dec 2020 13:08) (18 - 18)  SpO2: 99% (23 Dec 2020 13:08) (97% - 100%)     I&O's Summary    22 Dec 2020 07:01  -  23 Dec 2020 07:00  --------------------------------------------------------  IN: 520 mL / OUT: 1125 mL / NET: -605 mL    23 Dec 2020 07:01  -  23 Dec 2020 15:19  --------------------------------------------------------  IN: 297 mL / OUT: 0 mL / NET: 297 mL      GENERAL:  [x ]Alert  [x ]Oriented x 3 ([place])  [ ]Lethargic  [ ]Cachexia  [ ]Unarousable  [x ]Verbal  [ ]Non-Verbal  Behavioral:   [ ] Anxiety  [ ] Delirium [ ] Agitation [ ] Other  HEENT:  [ x]Normal   [ ]Dry mouth   [ ]ET Tube/Trach  [ ]Oral lesions  PULMONARY:   [ ]Clear [ ]Tachypnea  [ ]Audible excessive secretions   [ ]Rhonchi        [ ]Right [ ]Left [ ]Bilateral  [x ]Crackles        [ ]Right [ ]Left [x ]Bilateral  [ ]Wheezing     [ ]Right [ ]Left [ ]Bilateral  [ ]Diminished breath sounds [ ]right [ ]left [ ]bilateral  CARDIOVASCULAR:    [xRegular [ ]Irregular [ ]Tachy  [ ]Theodore [ ]Murmur [ ]Other  GASTROINTESTINAL:  [ x]Soft  [ ]Distended   [ ]+BS  [ x]Non tender [ ]Tender  [ ]PEG [ ]OGT/ NGT  Last BM: 20  GENITOURINARY:  [ ]Normal [ x] Incontinent   [ ]Oliguria/Anuria   [ ]Alejo  MUSCULOSKELETAL:   [ ]Normal   [ x]Weakness  [ ]Bed/Wheelchair bound [ ]Edema  NEUROLOGIC:   [ x]No focal deficits  [ ]Cognitive impairment  [ ]Dysphagia [ ]Dysarthria [ ]Paresis [ ]Other   SKIN:   [x]Normal    [ ]Rash  [ ]Pressure ulcer(s)       Present on admission [ ]y [ ]n    CRITICAL CARE:  [ ] Shock Present  [ ]Septic [ ]Cardiogenic [ ]Neurologic [ ]Hypovolemic  [ ]  Vasopressors [ ]  Inotropes   [ ]Respiratory failure present [ ]Mechanical ventilation [ ]Non-invasive ventilatory support [ ]High flow  [ ]Acute  [ ]Chronic [ ]Hypoxic  [ ]Hypercarbic [ ]Other  [x ]Other organ failure -renal, heart    LABS: reviewed                        8.0    3.67  )-----------( 48       ( 23 Dec 2020 05:40 )             23.0   12    120<LL>  |  89<L>  |  46<H>  ----------------------------<  119<H>  4.4   |  18<L>  |  1.53<H>    Ca    9.2      23 Dec 2020 05:40  Mg     1.8         TPro  5.3<L>  /  Alb  3.5  /  TBili  0.5  /  DBili  x   /  AST  186<H>  /  ALT  317<H>  /  AlkPhos  47          RADIOLOGY & ADDITIONAL STUDIES:  < from: US Abdomen Complete (US Abdomen Complete .) (20 @ 13:50) >  Coarsened echotexture of the liver, may reflect underlying parenchymal disease.    Cholelithiasis.    Trace ascites. Bilateral pleural effusions.    No hydronephrosis.    PROTEIN CALORIE MALNUTRITION PRESENT: [ ]mild [ ]moderate [ ]severe [ ]underweight [ ]morbid obesity  https://www.andeal.org/vault/2440/web/files/ONC/Table_Clinical%20Characteristics%20to%20Document%20Malnutrition-White%20JV%20et%20al%2020.pdf    Height (cm): 157.5 (20 @ 10:29)  Weight (kg): 49.9 (20 @ 10:29)  BMI (kg/m2): 20.1 (20 @ 10:29)    [ ]PPSV2 < or = to 30% [ ]significant weight loss  [ ]poor nutritional intake  [ ]anasarca     Albumin, Serum: 3.5 g/dL (20 @ 08:51)   [ ]Artificial Nutrition      REFERRALS:   [ ]Chaplaincy  [ x]Hospice  [ ]Child Life  [x ]Social Work  [x ]Case management [ ]Holistic Therapy     Goals of Care Document: RY

## 2020-12-23 NOTE — CONSULT NOTE ADULT - CONVERSATION DETAILS
Discussed in depth with patient's daughter, Eve Durbin with patient at bedside. Per daughter, patient and family aware of multiple medical comorbidities which means that patient's time is limited. Confirmed that patient is DNR/I, no heroic measures. RY completed. Discussed Hospice services. Daughter agreeable to hospice referral and requested mandarin speaking aide If possible.

## 2020-12-23 NOTE — CONSULT NOTE ADULT - REASON FOR ADMISSION
lethargy, bradycardia, electrolyte abnormalities

## 2020-12-23 NOTE — PROVIDER CONTACT NOTE (CRITICAL VALUE NOTIFICATION) - TEST AND RESULT REPORTED:
Hematocrit 20.1
Na 115
Sodium 120
hb/hct
Na 115
Na 119
Hemoglobin 6.6, Hematocrit 18.4
sodium of 111
Na: 120
Sodium 118,119
Na 118
Na: 119

## 2020-12-23 NOTE — CONSULT NOTE ADULT - PROBLEM SELECTOR RECOMMENDATION 5
Will continue to follow    Val Hung DO  Hospice and Palliative Care Fellow   [facility] Pager [phone]  [facility] Pager [phone]

## 2020-12-23 NOTE — CONSULT NOTE ADULT - PROBLEM SELECTOR RECOMMENDATION 2
ECHO 12/2020: Severely dilated left ventricle.  Severely reduced left ventricular systolic function. Large area of apical akinesis.  Severe mitral regurgitation. Severe pulmonary hypertension. EF 20-25%  - Cardiology following  - Coreg on hold for now

## 2020-12-23 NOTE — PROGRESS NOTE ADULT - ATTENDING COMMENTS
CKD3, BECKY, hyponatremia.  No edema    - Sodium low,  but stable  - Hyperkalemia resolved  - Expectant management of BECKY  - Favor discharge home with do not rehospitalize if in line with GOC; overall, she seems to be comfortable, and if that is focus, would favor quick discharge  - Remainder per fellow, will follow

## 2020-12-23 NOTE — PROVIDER CONTACT NOTE (CRITICAL VALUE NOTIFICATION) - ACTION/TREATMENT ORDERED:
provider made aware no further action at this time. will continue to monitor.
As per MANDY Mackenzie, continue to monitor the patient.
NP aware; No acute intervention at this time; will draw labs in AM as ordered
will continue to monitor
will continue to monitor.
As per MANDY Mackenzie, continue to monitor.
NP Lisandra aware; as per NP continue monitoring; no acute intervention at this time
notified NP, no acute intervention

## 2020-12-23 NOTE — CONSULT NOTE ADULT - ATTENDING COMMENTS
CKD3, BECKY, hyponatremia, hyperkalemia.  [place] rapid sodium correction following saline solutions.  Now receiving short course D5.  No edema    - Monitor Sodium closely, every 6 hours  - Hyperkalemia resolved  - Expectant management of BECKY  - Although patient DNR/DNI, may want to further clarify GOC as most workup may not be indicated or in line with expectations for patient and family; I suspect best to correct acute issues and discharge home (consider as well discussion around do not re-hospitalize, if indicated per GOC)  - Remainder per fellow, will follow
89 yr hyponatremia, hyperkalemia  not a MICU candidate at present  pt follows commands excellently in Mandarin  low Na has to be chronic, give 1.5% and start salt tabs  medical treatment of hyperkalemia  supportive care
I was physically present for the key portions of the evaluation and management (E/M) service provided.  I agree with the above history, physical, and plan which I have reviewed and edited where appropriate. Plan discussed with team.    ______________  Sanket Paul MD   of Geriatric and Palliative Medicine  [facility]     Please page the following number for clinical matters between the hours of 9AM and 5PM   from Monday through Friday : [phone]    After 5PM and on weekends, please page: [phone]. The Geriatric and Palliative Medicine consult service has 24/7 coverage for medical recommendations, including for symptom management needs.

## 2020-12-23 NOTE — CONSULT NOTE ADULT - PROBLEM SELECTOR RECOMMENDATION 4
Patient is DNR/I. MOLST completed and placed in chart.   - Please refer to [facility] note above.   - Patient with 3 children- 1  and 1 in [place]. Daughter, Eve Durbin is surrogate.   - Plan is for home with hospice. Hospice referral made today. Patient is DNR/I. MOLST completed and placed in chart.   - Please refer to [facility] note above.   - Patient with 3 children- 1  and 1 in [place]. Daughter, Eve Durbin is surrogate.   - Plan is for home with hospice. Hospice referral made today.    22 mins spent

## 2020-12-23 NOTE — PROGRESS NOTE ADULT - ASSESSMENT
_________________________________________________________________________________________  ========>>  M E D I C A L   A T T E N D I N G    F O L L O W  U P  N O T E  <<=========  -----------------------------------------------------------------------------------------------------    - Patient seen and examined by me earlier today.   - In summary,  JEANNA LUNA is a 89y year old woman admitted with lethargy  - Patient today overall doing ok, comfortable, eating well per staff     ==================>> REVIEW OF SYSTEM <<=================    limited ROS   GEN: no pain  RESP: no SOB, no cough  CVS: no chest pain  GI: no abdominal pain  : no dysuria  Neuro: no headache    ==================>> PHYSICAL EXAM <<=================    GEN: Alert and responsive, , NAD , comfortable in bed  HEENT: NCAT, PERRL, MMM, hearing intact  Neck: supple , no JVD appreciated  CVS: S1S2 , regular , No M/R/G appreciated   PULM: CTA B/L,  no W/R/R appreciated  ABD.: soft. non tender, non distended,  bowel sounds present  Extrem: intact pulses , no edema      + solis in place and draining clear urine   PSYCH : normal mood,  not anxious      ==================>> MEDICATIONS <<====================    dextrose 40% Gel 15 Gram(s) Oral once  dextrose 5%. 1000 milliLiter(s) IV Continuous <Continuous>  dextrose 5%. 1000 milliLiter(s) IV Continuous <Continuous>  dextrose 50% Injectable 25 Gram(s) IV Push once  dextrose 50% Injectable 12.5 Gram(s) IV Push once  dextrose 50% Injectable 25 Gram(s) IV Push once  famotidine    Tablet 20 milliGRAM(s) Oral daily  glucagon  Injectable 1 milliGRAM(s) IntraMuscular once  insulin lispro (ADMELOG) corrective regimen sliding scale   SubCutaneous three times a day before meals  multivitamin 1 Tablet(s) Oral daily    MEDICATIONS  (PRN):  acetaminophen   Tablet .. 650 milliGRAM(s) Oral every 6 hours PRN Mild Pain (1 - 3), Moderate Pain (4 - 6)    ___________  Active diet:  Diet, Regular:   Consistent Carbohydrate Evening Snack (CSTCHOSN)  1000mL Fluid Restriction ([id number])  Supplement Feeding Modality:  Oral  Nepro Cans or Servings Per Day:  3       Frequency:  Three Times a day  ___________________    ==================>> VITAL SIGNS <<==================    Vital Signs Last 24 HrsT(C): 36.8 (12-23-20 @ 13:08)  T(F): 98.2 (12-23-20 @ 13:08), Max: 98.2 (12-23-20 @ 13:08)  HR: 90 (12-23-20 @ 13:08) (76 - 97)  BP: 108/66 (12-23-20 @ 13:08)  RR: 18 (12-23-20 @ 13:08) (18 - 18)  SpO2: 99% (12-23-20 @ 13:08) (97% - 99%)      POCT Blood Glucose.: 222 mg/dL (23 Dec 2020 17:06)  POCT Blood Glucose.: 239 mg/dL (23 Dec 2020 12:19)  POCT Blood Glucose.: 142 mg/dL (23 Dec 2020 07:55)  POCT Blood Glucose.: 183 mg/dL (22 Dec 2020 21:24)     ==================>> LAB AND IMAGING <<==================                        8.0    3.67  )-----------( 48       ( 23 Dec 2020 05:40 )             23.0        12-23    120<LL>  |  89<L>  |  46<H>  ----------------------------<  119<H>  4.4   |  18<L>  |  1.53<H>    Ca    9.2      23 Dec 2020 05:40  Mg     1.8     12-23    TPro  5.3<L>  /  Alb  3.5  /  TBili  0.5  /  DBili  x   /  AST  186<H>  /  ALT  317<H>  /  AlkPhos  47  12-22    WBC count:   3.67 <<== ,  3.89 <<== ,  2.74 <<== ,  3.11 <<== ,  3.07 <<== ,  5.26 <<==   Hemoglobin:   8.0 <<==,  8.1 <<==,  6.6 <<==,  6.9 <<==,  7.2 <<==,  8.5 <<==  platelets:  48 <==, 48 <==, 48 <==, 47 <==, 48 <==, 82 <==    Creatinine:  1.53  <<==, 1.57  <<==, 1.58  <<==, 1.79  <<==, 1.85  <<==, 1.90  <<==  Sodium:   120  <==, 119  <==, 115  <==, 118  <==, 120  <==, 118  <==, 120  <==       AST:          186 <== , 208 <== , 305 <== , 178 <== , 122 <==      ALT:        317  <== , 332  <== , 359  <== , 190  <== , 128  <==      AP:        47  <=, 45  <=, 47  <=, 47  <=, 52  <=     Bili:        0.5  <=, 0.6  <=, 0.8  <=, 1.0  <=, 1.3  <=     < from: US Abdomen Complete (US Abdomen Complete .) (12.21.20 @ 13:50) >  IMPRESSION:  Coarsened echotexture of the liver, may reflect underlying parenchymal disease.  Cholelithiasis.  Trace ascites. Bilateral pleural effusions.  No hydronephrosis.  < end of copied text >      < from: TTE with Doppler (w/Cont) (12.21.20 @ 05:10) >  Conclusions:  Endocardial visualization enhanced with intravenous  injection of Ultrasonic Enhancing Agent (Definity).  Severely dilated left ventricle.  Severely reduced left ventricular systolic function. Large  area of apical akinesis.  Severe mitral regurgitation.  Severe pulmonary hypertension.  ------------------------------------------------------------------------  Confirmed on  12/21/2020 - 10:50:30 by Jorge Salas MD,  FARIDEH  < end of copied text >    ___________________________________________________________________________________  ===============>>  A S S E S S M E N T   A N D   P L A N <<===============  ------------------------------------------------------------------------------------------    Pt is a 89 y/o woman, reportedly A&OX3, with pmh of ckd, htn, chf, hld, htn, dm, pw bradycardia and lethargy.   reports pt has been complaining of weakness, malaise, mild cp and sob. pt has been receiving diuretics and nitro from family. ems found pt to have bradycardia in 40s, junctional, received .5 atropine w/ improvement to 70s. denies f/c.     Problem/Plan - 1:  ·  Problem:  BECKY on CKD ( unknown baseline)     overall some improvement   Avoid nephrotoxic medications.   judicial use of diuretics for CHF  held ARB   nephrology following and discussed   monitor BMP closely.   TOV     Problem/Plan - 2:  ·  Problem: Hyperkalemia  improved post treatment    diuretics as above   held ARB   nephrology following   encourage PO hydration  monitor BMP closely.     Problem/Plan - 3:  ·  Problem: Hyponatremia.    likely chronic, stable     partly related to severe CHF   held ARB   nephrology appreciated   encourage PO intake   monitor BMP     Problem/Plan - 4:  ·  Problem: Bradycardia.     improved as potassium improved  monitor HR closely  tele  echo as above   cardio f/u     Problem/Plan - 5:  ·  Problem: Type 2 diabetes mellitus with other specified complication, without long-term current use of insulin.  Plan: hold oral meds  monitor FS  RISS  A1C.     Problem/Plan - 6:  Problem: pt with chronic systolic HF     Echo as above showing severe chronic systolic CHF with severe MR and severe pulmonary HTN   will plan to resume diuretics  : currently euvolemic   tele  cardio follow up and mgmt   overall poor prognosis  palliative consult for GOC discussion / MOLST     Problem/Plan - 7:  Problem: pancytopenia     decreased WBC, HGL and platelets   possibly partly due to dilutional effect ?   likely anemia of chronic disease     per pt's Dtr pt receives weekly procrit 40K>> ordered one dose   no sings of bleeding at  this time   heparin SQ held for now  monitor closely     Problem/Plan - 8:  Problem: Transaminitis worsening  suspect due to hypoperfusion in setting of bradycardia and CHF / congestive hepatopathy  abdominal sono noted   monitor    -GI/DVT Prophylaxis per protocol.    venodynes     palliative consult for [facility] discussion / MOLST   --------------------------------------------  Case discussed with NP, renal..   Education given on findings and plan of care  ___________________________  H. LETITAI Diaz.  Pager: [phone]

## 2020-12-23 NOTE — PROVIDER CONTACT NOTE (CRITICAL VALUE NOTIFICATION) - BACKGROUND
Patient admitted for hyperkalemia
Patient admitted with hyperkalemia.
Patient admitted with Hyperkalemia.
Pt admitted for hyperkalemia, hyponatremia, bradycardia
pt admitted for hypokalemia, hyponatremia, bradycardia
Pt admitted for hyperkalemia.
89 y/o female admitted with weakness lethargy and CP pt with accelerated ventricular rhythm
Pt admitted for hyponatremia, hyperkalemia, bradycardia
pt admitted for hyperkalemia
pt admitted for hyponatremia, hypokalemia and bradycardia
Pt admitted for hyperkalemia.

## 2020-12-23 NOTE — CONSULT NOTE ADULT - PROBLEM SELECTOR RECOMMENDATION 9
Patient with abdominal pain after eating. Consider reflux?  - tylenol 650 mg PO q6 PRN mild pain  - Continue Pepcid 20 mg PO qd

## 2020-12-23 NOTE — PROVIDER CONTACT NOTE (CRITICAL VALUE NOTIFICATION) - PERSON GIVING RESULT:
Ar Villarreal
Darren Sevilla/ Angela
Dayna Miller
Rosas Lin/quiana
Tawanda Winkler/ Critical Lab
Leonor Lin/ Chem lab
Erwin Mejia/Critical Lab
Tyson Lauren/ Critical Lab
Dayna Dickson
Dayna Kirkland
Emily Rojas
[facility] lab - Surap Kana

## 2020-12-23 NOTE — PROVIDER CONTACT NOTE (CRITICAL VALUE NOTIFICATION) - SITUATION
Hemoglobin 6.6, Hematocrit 18.4
Na 115
sodium of 111
Na: 119
Sodium 118,119
critical lab results for hemoglobin and hematacrit
Hematocrit 20.1
Sodium 120
Na 118
Na 119
Na: 120

## 2020-12-23 NOTE — PROVIDER CONTACT NOTE (CRITICAL VALUE NOTIFICATION) - RECOMMENDATIONS
As per MANDY Mackenzie, continue to monitor.
notified NP, no acute intervention
notify provider.
NP Lisandra aware; as per NP continue monitoring; no acute intervention at this time
NP aware; No acute intervention at this time; will draw labs in AM as ordered
As per MANDY Mackenzie, continue to monitor the patient.

## 2020-12-23 NOTE — PROGRESS NOTE ADULT - ASSESSMENT
89y F with HF, CKD3 p/w BECKY, hyponatremia, hyperkalemia          #Hyponatremia  - Pt with hyponatremia in setting of acute renal failure and inability to excrete free water along with HFrEF, Cr on admission was 2.8mg/dl, now down to 1.5mg/dl which is near her baseline  - Most recent Na is 120, noted TTE findings of severe LV dysfunction. severe MR, severe pulm htn. At this time would recommend conservative measures as pt has end stage heart failure. Would recommend palliative consult and discharge home with hospice  - No need for fluid restriction at this time      #BECKY  - Improving at this time, baseline Scr 1.44mg/dl, now Cr 1.5mg/dl  - Dose medications to eGFR<20    #Hyperkalemia  - Resolved at this time. bradycardia improved. Hold further spironolactone for now    #Anemia  -Pt with anemia of chronic disease, ferritin 5k. Would not give PEG therapy at this time as pt will be resistant to it's effects      89y F with HF, CKD3 p/w BECKY, hyponatremia, hyperkalemia          #Hyponatremia  - Pt with hyponatremia in setting of acute renal failure and inability to excrete free water along with HFrEF, Cr on admission was 2.8mg/dl, now down to 1.5mg/dl which is near her baseline  - Most recent Na is 120, noted TTE findings of severe LV dysfunction. severe MR, severe pulm htn. At this time would recommend conservative measures as pt has end stage heart failure. Would recommend palliative consult and discharge home with hospice/ family.       #BECKY  - Improving at this time, baseline Scr 1.44mg/dl, now Cr 1.5mg/dl  - Dose medications to eGFR<20    #Hyperkalemia  - Resolved at this time. bradycardia improved. Hold further spironolactone for now    #Anemia  -Pt with anemia of chronic disease, ferritin 5k. Would not give PEG therapy at this time as pt will be resistant to it's effects due to the degree of inflammation

## 2020-12-24 LAB
ANION GAP SERPL CALC-SCNC: 13 MMOL/L — SIGNIFICANT CHANGE UP (ref 5–17)
BUN SERPL-MCNC: 48 MG/DL — HIGH (ref 7–23)
CALCIUM SERPL-MCNC: 9.6 MG/DL — SIGNIFICANT CHANGE UP (ref 8.4–10.5)
CHLORIDE SERPL-SCNC: 91 MMOL/L — LOW (ref 96–108)
CO2 SERPL-SCNC: 20 MMOL/L — LOW (ref 22–31)
CREAT SERPL-MCNC: 1.38 MG/DL — HIGH (ref 0.5–1.3)
GLUCOSE BLDC GLUCOMTR-MCNC: 132 MG/DL — HIGH (ref 70–99)
GLUCOSE BLDC GLUCOMTR-MCNC: 141 MG/DL — HIGH (ref 70–99)
GLUCOSE BLDC GLUCOMTR-MCNC: 171 MG/DL — HIGH (ref 70–99)
GLUCOSE BLDC GLUCOMTR-MCNC: 208 MG/DL — HIGH (ref 70–99)
GLUCOSE SERPL-MCNC: 121 MG/DL — HIGH (ref 70–99)
HCT VFR BLD CALC: 26.4 % — LOW (ref 34.5–45)
HGB BLD-MCNC: 9.4 G/DL — LOW (ref 11.5–15.5)
MAGNESIUM SERPL-MCNC: 2 MG/DL — SIGNIFICANT CHANGE UP (ref 1.6–2.6)
MCHC RBC-ENTMCNC: 33.8 PG — SIGNIFICANT CHANGE UP (ref 27–34)
MCHC RBC-ENTMCNC: 35.6 GM/DL — SIGNIFICANT CHANGE UP (ref 32–36)
MCV RBC AUTO: 95 FL — SIGNIFICANT CHANGE UP (ref 80–100)
NRBC # BLD: 0 /100 WBCS — SIGNIFICANT CHANGE UP (ref 0–0)
PLATELET # BLD AUTO: 61 K/UL — LOW (ref 150–400)
POTASSIUM SERPL-MCNC: 4.3 MMOL/L — SIGNIFICANT CHANGE UP (ref 3.5–5.3)
POTASSIUM SERPL-SCNC: 4.3 MMOL/L — SIGNIFICANT CHANGE UP (ref 3.5–5.3)
RBC # BLD: 2.78 M/UL — LOW (ref 3.8–5.2)
RBC # FLD: 14 % — SIGNIFICANT CHANGE UP (ref 10.3–14.5)
SODIUM SERPL-SCNC: 124 MMOL/L — LOW (ref 135–145)
WBC # BLD: 4.98 K/UL — SIGNIFICANT CHANGE UP (ref 3.8–10.5)
WBC # FLD AUTO: 4.98 K/UL — SIGNIFICANT CHANGE UP (ref 3.8–10.5)

## 2020-12-24 RX ADMIN — Medication 650 MILLIGRAM(S): at 11:47

## 2020-12-24 RX ADMIN — Medication 1 TABLET(S): at 11:46

## 2020-12-24 RX ADMIN — Medication 1: at 12:29

## 2020-12-24 RX ADMIN — Medication 650 MILLIGRAM(S): at 17:54

## 2020-12-24 RX ADMIN — FAMOTIDINE 20 MILLIGRAM(S): 10 INJECTION INTRAVENOUS at 11:46

## 2020-12-24 RX ADMIN — Medication 2: at 16:59

## 2020-12-24 NOTE — CHART NOTE - NSCHARTNOTEFT_GEN_A_CORE
Chart reviewed. Patient did not require Tylenol PRN. Patient is pending discharge to home with hospice. Goals of care discussion yesterday. Patient remains DNR/I. MOLST in chart. Will sign off. Please reconsult if needed.     Val Hung DO  Hospice and Palliative Care Fellow   [facility] Pager [phone]  [facility] Pager [phone] Chart reviewed. Patient did not require Tylenol PRN. Patient is pending discharge to home with hospice. Goals of care discussion yesterday. Patient remains DNR/I. MOLST in chart. Will sign off. Please reconsult if needed    Val Hung DO  Hospice and Palliative Care Fellow   [facility] Pager [phone]  [facility] Pager [phone]

## 2020-12-24 NOTE — PROGRESS NOTE ADULT - ASSESSMENT
_________________________________________________________________________________________  ========>>  M E D I C A L   A T T E N D I N G    F O L L O W  U P  N O T E  <<=========  -----------------------------------------------------------------------------------------------------    - Patient seen and examined by me earlier today.   - In summary,  JEANNA LUNA is a 89y year old woman admitted with lethargy  - Patient today overall doing ok, comfortable, eating well per staff     ==================>> REVIEW OF SYSTEM <<=================    limited ROS   GEN: no pain  RESP: no SOB, no cough  CVS: no chest pain  GI: no abdominal pain  : no dysuria  Neuro: no headache    ==================>> PHYSICAL EXAM <<=================    GEN: Alert and responsive, , NAD , comfortable in bed  HEENT: NCAT, PERRL, MMM, hearing intact  Neck: supple , no JVD appreciated  CVS: S1S2 , regular , No M/R/G appreciated   PULM: CTA B/L,  no W/R/R appreciated  ABD.: soft. non tender, non distended,  bowel sounds present  Extrem: intact pulses , no edema   PSYCH : normal mood,  not anxious      ==================>> MEDICATIONS <<====================    dextrose 40% Gel 15 Gram(s) Oral once  dextrose 5%. 1000 milliLiter(s) IV Continuous <Continuous>  dextrose 5%. 1000 milliLiter(s) IV Continuous <Continuous>  dextrose 50% Injectable 25 Gram(s) IV Push once  dextrose 50% Injectable 12.5 Gram(s) IV Push once  dextrose 50% Injectable 25 Gram(s) IV Push once  famotidine    Tablet 20 milliGRAM(s) Oral daily  glucagon  Injectable 1 milliGRAM(s) IntraMuscular once  insulin lispro (ADMELOG) corrective regimen sliding scale   SubCutaneous three times a day before meals  multivitamin 1 Tablet(s) Oral daily    MEDICATIONS  (PRN):  acetaminophen   Tablet .. 650 milliGRAM(s) Oral every 6 hours PRN Mild Pain (1 - 3), Moderate Pain (4 - 6)    ___________  Active diet:  Diet, Regular:   Consistent Carbohydrate Evening Snack (CSTCHOSN)  1000mL Fluid Restriction ([id number])  Supplement Feeding Modality:  Oral  Nepro Cans or Servings Per Day:  3       Frequency:  Three Times a day  ___________________    ==================>> VITAL SIGNS <<==================    Vital Signs Last 24 HrsT(C): 36.2 (12-24-20 @ 12:39)  T(F): 97.2 (12-24-20 @ 12:39), Max: 98.5 (12-23-20 @ 22:14)  HR: 85 (12-24-20 @ 12:39) (85 - 90)  BP: 122/73 (12-24-20 @ 12:39)  RR: 16 (12-24-20 @ 12:39) (16 - 16)  SpO2: 100% (12-24-20 @ 12:39) (100% - 100%)      POCT Blood Glucose.: 208 mg/dL (24 Dec 2020 16:56)  POCT Blood Glucose.: 171 mg/dL (24 Dec 2020 12:01)  POCT Blood Glucose.: 141 mg/dL (24 Dec 2020 07:43)  POCT Blood Glucose.: 175 mg/dL (23 Dec 2020 22:14)     ==================>> LAB AND IMAGING <<==================                        9.4    4.98  )-----------( 61       ( 24 Dec 2020 06:11 )             26.4        12-24    124<L>  |  91<L>  |  48<H>  ----------------------------<  121<H>  4.3   |  20<L>  |  1.38<H>    Ca    9.6      24 Dec 2020 06:11  Mg     2.0     12-24      WBC count:   4.98 <<== ,  3.67 <<== ,  3.89 <<== ,  2.74 <<== ,  3.11 <<== ,  3.07 <<==   Hemoglobin:   9.4 <<==,  8.0 <<==,  8.1 <<==,  6.6 <<==,  6.9 <<==,  7.2 <<==  platelets:  61 <==, 48 <==, 48 <==, 48 <==, 47 <==, 48 <==, 82 <==    Creatinine:  1.38  <<==, 1.53  <<==, 1.57  <<==, 1.58  <<==, 1.79  <<==, 1.85  <<==  Sodium:   124  <==, 120  <==, 119  <==, 115  <==, 118  <==, 120  <==, 118  <==       AST:          186 <== , 208 <== , 305 <== , 178 <== , 122 <==      ALT:        317  <== , 332  <== , 359  <== , 190  <== , 128  <==      AP:        47  <=, 45  <=, 47  <=, 47  <=, 52  <=     Bili:        0.5  <=, 0.6  <=, 0.8  <=, 1.0  <=, 1.3  <=     < from: US Abdomen Complete (US Abdomen Complete .) (12.21.20 @ 13:50) >  IMPRESSION:  Coarsened echotexture of the liver, may reflect underlying parenchymal disease.  Cholelithiasis.  Trace ascites. Bilateral pleural effusions.  No hydronephrosis.  < end of copied text >      < from: TTE with Doppler (w/Cont) (12.21.20 @ 05:10) >  Conclusions:  Endocardial visualization enhanced with intravenous  injection of Ultrasonic Enhancing Agent (Definity).  Severely dilated left ventricle.  Severely reduced left ventricular systolic function. Large  area of apical akinesis.  Severe mitral regurgitation.  Severe pulmonary hypertension.  ------------------------------------------------------------------------  Confirmed on  12/21/2020 - 10:50:30 by Jorge Salas MD,  FARIDEH  < end of copied text >    ___________________________________________________________________________________  ===============>>  A S S E S S M E N T   A N D   P L A N <<===============  ------------------------------------------------------------------------------------------    Pt is a 89 y/o woman, reportedly A&OX3, with pmh of ckd, htn, chf, hld, htn, dm, pw bradycardia and lethargy.   reports pt has been complaining of weakness, malaise, mild cp and sob. pt has been receiving diuretics and nitro from family. ems found pt to have bradycardia in 40s, junctional, received .5 atropine w/ improvement to 70s. denies f/c.     Problem/Plan - 1:  ·  Problem:  BECKY on CKD ( unknown baseline)     overall some improvement   Avoid nephrotoxic medications.   judicial use of diuretics for CHF  held ARB   nephrology following and discussed   monitor BMP closely.   TOV     Problem/Plan - 2:  ·  Problem: Hyperkalemia  improved post treatment    diuretics as above   held ARB   nephrology following   encourage PO hydration  monitor BMP closely.     Problem/Plan - 3:  ·  Problem: Hyponatremia.    likely chronic, stable     partly related to severe CHF   held ARB   nephrology appreciated   encourage PO intake   monitor BMP     Problem/Plan - 4:  ·  Problem: Bradycardia.     improved as potassium improved  monitor HR closely  tele  echo as above   cardio f/u     Problem/Plan - 5:  ·  Problem: Type 2 diabetes mellitus with other specified complication, without long-term current use of insulin.  Plan: hold oral meds  monitor FS  RISS  A1C.     Problem/Plan - 6:  Problem: pt with chronic systolic HF     Echo as above showing severe chronic systolic CHF with severe MR and severe pulmonary HTN   will plan to resume diuretics  : currently euvolemic      ? need for AC given apical akinesis   tele  cardio follow up and mgmt   overall poor prognosis  palliative consult for [facility] discussion / MOLST     Problem/Plan - 7:  Problem: pancytopenia     decreased WBC, HGL and platelets   possibly partly due to dilutional effect ?   likely anemia of chronic disease     per pt's Dtr pt receives weekly procrit 40K>> ordered one dose   no sings of bleeding at  this time   heparin SQ held for now  monitor closely     Problem/Plan - 8:  Problem: Transaminitis worsening  suspect due to hypoperfusion in setting of bradycardia and CHF / congestive hepatopathy  abdominal sono noted   monitor    -GI/DVT Prophylaxis per protocol.    venodynes     palliative consult for [facility] discussion / MOLST / hospice     ___________________________  H. LETITIA Diaz.  Pager: [phone]

## 2020-12-24 NOTE — HOSPICE CARE NOTE - CONVESATION DETAILS
RN spoke with dtr Eve and justice Zurita via telephone this am. RN Explained hospice POC, home hospice services, short term nature of inpt hospice care for symptom management. Explained coordination of HHA provided by [facility] with aides provided under pt's medicaid benefit.  Explained 24h availability of [facility] RN for questions, concerns, changes in pt condition. Explained "comfort pack" medications sent for use as directed by [facility] staff for symptom management.    Explained calling [facility] and not 911 in case of emergency to ensure continuity of care.   Eve would like to continue with weekly Procrit injections at MDs office, RN explained that this measures is not part hospice poc under the medicare benefit. Family encouraged to discuss the appropriateness of continuing this medication with team. [facility] contact information provided if services are desired.

## 2020-12-25 LAB
ANION GAP SERPL CALC-SCNC: 12 MMOL/L — SIGNIFICANT CHANGE UP (ref 5–17)
BUN SERPL-MCNC: 41 MG/DL — HIGH (ref 7–23)
CALCIUM SERPL-MCNC: 9.3 MG/DL — SIGNIFICANT CHANGE UP (ref 8.4–10.5)
CHLORIDE SERPL-SCNC: 95 MMOL/L — LOW (ref 96–108)
CO2 SERPL-SCNC: 18 MMOL/L — LOW (ref 22–31)
CREAT SERPL-MCNC: 1.38 MG/DL — HIGH (ref 0.5–1.3)
CULTURE RESULTS: SIGNIFICANT CHANGE UP
CULTURE RESULTS: SIGNIFICANT CHANGE UP
GLUCOSE BLDC GLUCOMTR-MCNC: 153 MG/DL — HIGH (ref 70–99)
GLUCOSE BLDC GLUCOMTR-MCNC: 177 MG/DL — HIGH (ref 70–99)
GLUCOSE BLDC GLUCOMTR-MCNC: 200 MG/DL — HIGH (ref 70–99)
GLUCOSE BLDC GLUCOMTR-MCNC: 205 MG/DL — HIGH (ref 70–99)
GLUCOSE SERPL-MCNC: 121 MG/DL — HIGH (ref 70–99)
HCT VFR BLD CALC: 24 % — LOW (ref 34.5–45)
HGB BLD-MCNC: 8.4 G/DL — LOW (ref 11.5–15.5)
MCHC RBC-ENTMCNC: 33.5 PG — SIGNIFICANT CHANGE UP (ref 27–34)
MCHC RBC-ENTMCNC: 35 GM/DL — SIGNIFICANT CHANGE UP (ref 32–36)
MCV RBC AUTO: 95.6 FL — SIGNIFICANT CHANGE UP (ref 80–100)
NRBC # BLD: 0 /100 WBCS — SIGNIFICANT CHANGE UP (ref 0–0)
PLATELET # BLD AUTO: 56 K/UL — LOW (ref 150–400)
POTASSIUM SERPL-MCNC: 4.3 MMOL/L — SIGNIFICANT CHANGE UP (ref 3.5–5.3)
POTASSIUM SERPL-SCNC: 4.3 MMOL/L — SIGNIFICANT CHANGE UP (ref 3.5–5.3)
RBC # BLD: 2.51 M/UL — LOW (ref 3.8–5.2)
RBC # FLD: 14.3 % — SIGNIFICANT CHANGE UP (ref 10.3–14.5)
SODIUM SERPL-SCNC: 125 MMOL/L — LOW (ref 135–145)
SPECIMEN SOURCE: SIGNIFICANT CHANGE UP
SPECIMEN SOURCE: SIGNIFICANT CHANGE UP
WBC # BLD: 3.52 K/UL — LOW (ref 3.8–10.5)
WBC # FLD AUTO: 3.52 K/UL — LOW (ref 3.8–10.5)

## 2020-12-25 RX ADMIN — Medication 1 TABLET(S): at 11:33

## 2020-12-25 RX ADMIN — Medication 1: at 08:24

## 2020-12-25 RX ADMIN — FAMOTIDINE 20 MILLIGRAM(S): 10 INJECTION INTRAVENOUS at 11:33

## 2020-12-25 RX ADMIN — Medication 1: at 17:47

## 2020-12-25 RX ADMIN — Medication 2: at 12:11

## 2020-12-25 NOTE — PROGRESS NOTE ADULT - ASSESSMENT
_________________________________________________________________________________________  ========>>  M E D I C A L   A T T E N D I N G    F O L L O W  U P  N O T E  <<=========  -----------------------------------------------------------------------------------------------------    - Patient seen and examined by me earlier today.   - In summary,  JEANNA LUNA is a 89y year old woman admitted with lethargy  - Patient today overall doing ok, comfortable, eating well per staff     ==================>> REVIEW OF SYSTEM <<=================    limited ROS   GEN: no pain  RESP: no SOB, no cough  CVS: no chest pain  GI: no abdominal pain  : no dysuria  Neuro: no headache    ==================>> PHYSICAL EXAM <<=================    GEN: Alert and responsive, , NAD , comfortable in bed  HEENT: NCAT, PERRL, MMM, hearing intact  Neck: supple , no JVD appreciated  CVS: S1S2 , regular , No M/R/G appreciated   PULM: CTA B/L,  no W/R/R appreciated  ABD.: soft. non tender, non distended,  bowel sounds present  Extrem: intact pulses , no edema   PSYCH : normal mood,  not anxious      ==================>> MEDICATIONS <<====================    dextrose 40% Gel 15 Gram(s) Oral once  dextrose 5%. 1000 milliLiter(s) IV Continuous <Continuous>  dextrose 5%. 1000 milliLiter(s) IV Continuous <Continuous>  dextrose 50% Injectable 25 Gram(s) IV Push once  dextrose 50% Injectable 12.5 Gram(s) IV Push once  dextrose 50% Injectable 25 Gram(s) IV Push once  famotidine    Tablet 20 milliGRAM(s) Oral daily  glucagon  Injectable 1 milliGRAM(s) IntraMuscular once  insulin lispro (ADMELOG) corrective regimen sliding scale   SubCutaneous three times a day before meals  multivitamin 1 Tablet(s) Oral daily    MEDICATIONS  (PRN):  acetaminophen   Tablet .. 650 milliGRAM(s) Oral every 6 hours PRN Mild Pain (1 - 3), Moderate Pain (4 - 6)    ___________  Active diet:  Diet, Regular:   Consistent Carbohydrate Evening Snack (CSTCHOSN)  1000mL Fluid Restriction ([id number])  Supplement Feeding Modality:  Oral  Nepro Cans or Servings Per Day:  3       Frequency:  Three Times a day  ___________________    ==================>> VITAL SIGNS <<==================    Vital Signs Last 24 HrsT(C): 36.6 (12-25-20 @ 05:21)  T(F): 97.8 (12-25-20 @ 05:21), Max: 97.8 (12-25-20 @ 05:21)  HR: 88 (12-25-20 @ 05:21) (87 - 88)  BP: 113/61 (12-25-20 @ 05:21)  RR: 18 (12-25-20 @ 05:21) (17 - 18)  SpO2: 100% (12-25-20 @ 05:21) (99% - 100%)      POCT Blood Glucose.: 205 mg/dL (25 Dec 2020 11:45)  POCT Blood Glucose.: 177 mg/dL (25 Dec 2020 07:56)  POCT Blood Glucose.: 132 mg/dL (24 Dec 2020 22:26)  POCT Blood Glucose.: 208 mg/dL (24 Dec 2020 16:56)     ==================>> LAB AND IMAGING <<==================                        8.4    3.52  )-----------( 56       ( 25 Dec 2020 05:52 )             24.0          125<L>  |  95<L>  |  41<H>  ----------------------------<  121<H>  4.3   |  18<L>  |  1.38<H>    Ca    9.3      25 Dec 2020 05:52  Mg     2.0     12-24      WBC count:   3.52 <<== ,  4.98 <<== ,  3.67 <<== ,  3.89 <<== ,  2.74 <<== ,  3.11 <<==   Hemoglobin:   8.4 <<==,  9.4 <<==,  8.0 <<==,  8.1 <<==,  6.6 <<==,  6.9 <<==  platelets:  56 <==, 61 <==, 48 <==, 48 <==, 48 <==, 47 <==, 48 <==    Creatinine:  1.38  <<==, 1.38  <<==, 1.53  <<==, 1.57  <<==, 1.58  <<==, 1.79  <<==  Sodium:   125  <==, 124  <==, 120  <==, 119  <==, 115  <==, 118  <==, 120  <==       AST:          186 <== , 208 <== , 305 <== , 178 <==      ALT:        317  <== , 332  <== , 359  <== , 190  <==      AP:        47  <=, 45  <=, 47  <=, 47  <=     Bili:        0.5  <=, 0.6  <=, 0.8  <=, 1.0  <=     < from: US Abdomen Complete (US Abdomen Complete .) (12.21.20 @ 13:50) >  IMPRESSION:  Coarsened echotexture of the liver, may reflect underlying parenchymal disease.  Cholelithiasis.  Trace ascites. Bilateral pleural effusions.  No hydronephrosis.  < end of copied text >    < from: TTE with Doppler (w/Cont) (12.21.20 @ 05:10) >  Conclusions:  Endocardial visualization enhanced with intravenous  injection of Ultrasonic Enhancing Agent (Definity).  Severely dilated left ventricle.  Severely reduced left ventricular systolic function. Large  area of apical akinesis.  Severe mitral regurgitation.  Severe pulmonary hypertension.  ------------------------------------------------------------------------  Confirmed on  12/21/2020 - 10:50:30 by Jorge Salas MD,  MARTHAE  < end of copied text >    ___________________________________________________________________________________  ===============>>  A S S E S S M E N T   ARABELLA N D   P L A N <<===============  ------------------------------------------------------------------------------------------    Pt is a 89 y/o woman, reportedly A&OX3, with pmh of ckd, htn, chf, hld, htn, dm, pw bradycardia and lethargy.   reports pt has been complaining of weakness, malaise, mild cp and sob. pt has been receiving diuretics and nitro from family. ems found pt to have bradycardia in 40s, junctional, received .5 atropine w/ improvement to 70s. denies f/c.     Problem/Plan - 1:  ·  Problem:  BECKY on CKD ( unknown baseline)     overall some improvement   Avoid nephrotoxic medications.   judicial use of diuretics for CHF  held ARB   nephrology following and discussed   monitor BMP closely.   TOV     Problem/Plan - 2:  ·  Problem: Hyperkalemia  improved post treatment    diuretics as above   held ARB   nephrology following   encourage PO hydration  monitor BMP closely.     Problem/Plan - 3:  ·  Problem: Hyponatremia.    likely chronic, stable     partly related to severe CHF   held ARB   nephrology appreciated   encourage PO intake   monitor BMP     Problem/Plan - 4:  ·  Problem: Bradycardia.     improved as potassium improved  monitor HR on tele  cardio f/u     Problem/Plan - 5:  ·  Problem: Type 2 diabetes mellitus with other specified complication, without long-term current use of insulin.  Plan: hold oral meds  monitor FS  RISS    Problem/Plan - 6:  Problem: pt with chronic systolic HF     Echo as above showing severe chronic systolic CHF with severe MR and severe pulmonary HTN   will plan to resume diuretics  : currently euvolemic      ? need for AC given apical akinesis   tele  cardio follow up and mgmt   overall poor prognosis  palliative / hospice following     Problem/Plan - 7:  Problem: anemia of chronic disease     per pt's Dtr pt receives weekly procrit 40K>> ordered one dose   no sings of bleeding at  this time   monitor     Problem/Plan - 8:  Problem: Transaminitis stable   suspect due to hypoperfusion in setting of bradycardia and CHF / congestive hepatopathy  abdominal sono noted   monitor    -GI/DVT Prophylaxis per protocol.    venodynes     hospice followup     ___________________________  H. LETITIA Diaz.  Pager: [phone]

## 2020-12-26 LAB
ANION GAP SERPL CALC-SCNC: 13 MMOL/L — SIGNIFICANT CHANGE UP (ref 5–17)
BUN SERPL-MCNC: 42 MG/DL — HIGH (ref 7–23)
CALCIUM SERPL-MCNC: 9.3 MG/DL — SIGNIFICANT CHANGE UP (ref 8.4–10.5)
CHLORIDE SERPL-SCNC: 94 MMOL/L — LOW (ref 96–108)
CO2 SERPL-SCNC: 19 MMOL/L — LOW (ref 22–31)
CREAT SERPL-MCNC: 1.32 MG/DL — HIGH (ref 0.5–1.3)
GLUCOSE BLDC GLUCOMTR-MCNC: 149 MG/DL — HIGH (ref 70–99)
GLUCOSE BLDC GLUCOMTR-MCNC: 166 MG/DL — HIGH (ref 70–99)
GLUCOSE BLDC GLUCOMTR-MCNC: 220 MG/DL — HIGH (ref 70–99)
GLUCOSE BLDC GLUCOMTR-MCNC: 237 MG/DL — HIGH (ref 70–99)
GLUCOSE SERPL-MCNC: 147 MG/DL — HIGH (ref 70–99)
HCT VFR BLD CALC: 25.4 % — LOW (ref 34.5–45)
HGB BLD-MCNC: 8.5 G/DL — LOW (ref 11.5–15.5)
MCHC RBC-ENTMCNC: 32.7 PG — SIGNIFICANT CHANGE UP (ref 27–34)
MCHC RBC-ENTMCNC: 33.5 GM/DL — SIGNIFICANT CHANGE UP (ref 32–36)
MCV RBC AUTO: 97.7 FL — SIGNIFICANT CHANGE UP (ref 80–100)
NRBC # BLD: 0 /100 WBCS — SIGNIFICANT CHANGE UP (ref 0–0)
PLATELET # BLD AUTO: 61 K/UL — LOW (ref 150–400)
POTASSIUM SERPL-MCNC: 4.7 MMOL/L — SIGNIFICANT CHANGE UP (ref 3.5–5.3)
POTASSIUM SERPL-SCNC: 4.7 MMOL/L — SIGNIFICANT CHANGE UP (ref 3.5–5.3)
RBC # BLD: 2.6 M/UL — LOW (ref 3.8–5.2)
RBC # FLD: 14.3 % — SIGNIFICANT CHANGE UP (ref 10.3–14.5)
SODIUM SERPL-SCNC: 126 MMOL/L — LOW (ref 135–145)
WBC # BLD: 3.38 K/UL — LOW (ref 3.8–10.5)
WBC # FLD AUTO: 3.38 K/UL — LOW (ref 3.8–10.5)

## 2020-12-26 RX ORDER — CARVEDILOL PHOSPHATE 80 MG/1
3.12 CAPSULE, EXTENDED RELEASE ORAL EVERY 12 HOURS
Refills: 0 | Status: DISCONTINUED | OUTPATIENT
Start: 2020-12-26 | End: 2020-12-27

## 2020-12-26 RX ORDER — LISINOPRIL 2.5 MG/1
2.5 TABLET ORAL DAILY
Refills: 0 | Status: DISCONTINUED | OUTPATIENT
Start: 2020-12-26 | End: 2020-12-28

## 2020-12-26 RX ADMIN — Medication 2: at 17:11

## 2020-12-26 RX ADMIN — Medication 1: at 08:17

## 2020-12-26 RX ADMIN — Medication 1 TABLET(S): at 12:42

## 2020-12-26 RX ADMIN — FAMOTIDINE 20 MILLIGRAM(S): 10 INJECTION INTRAVENOUS at 12:42

## 2020-12-26 RX ADMIN — CARVEDILOL PHOSPHATE 3.12 MILLIGRAM(S): 80 CAPSULE, EXTENDED RELEASE ORAL at 17:13

## 2020-12-26 NOTE — PROGRESS NOTE ADULT - ASSESSMENT
Patient seen, examined, and interviewed by me, case discussed, full note to follow   Dr. TRINITY Diaz ([phone])     _________________________________________________________________________________________  ========>>  M E D I C A L   A T T E N D I N G    F O L L O W  U P  N O T E  <<=========  -----------------------------------------------------------------------------------------------------    - Patient seen and examined by me earlier today.   - In summary,  JEANNA LUNA is a 89y year old woman admitted with lethargy  - Patient today overall doing ok, comfortable, eating well per Dtr       Dtr declined hospice : [facility] being set up to go home likely tomorrow     ==================>> REVIEW OF SYSTEM <<=================    limited ROS   GEN: no pain  RESP: no SOB, no cough  CVS: no chest pain  GI: no abdominal pain  : no dysuria  Neuro: no headache    ==================>> PHYSICAL EXAM <<=================    GEN: Alert and responsive, , NAD , comfortable in bed  HEENT: NCAT, PERRL, MMM, hearing intact  Neck: supple , no JVD appreciated  CVS: S1S2 , regular , No M/R/G appreciated   PULM: CTA B/L,  no W/R/R appreciated  ABD.: soft. non tender, non distended,  bowel sounds present  Extrem: intact pulses , no edema   PSYCH : normal mood,  not anxious      ==================>> MEDICATIONS <<====================    carvedilol 3.125 milliGRAM(s) Oral every 12 hours  dextrose 40% Gel 15 Gram(s) Oral once  dextrose 5%. 1000 milliLiter(s) IV Continuous <Continuous>  dextrose 5%. 1000 milliLiter(s) IV Continuous <Continuous>  dextrose 50% Injectable 25 Gram(s) IV Push once  dextrose 50% Injectable 12.5 Gram(s) IV Push once  dextrose 50% Injectable 25 Gram(s) IV Push once  famotidine    Tablet 20 milliGRAM(s) Oral daily  glucagon  Injectable 1 milliGRAM(s) IntraMuscular once  insulin lispro (ADMELOG) corrective regimen sliding scale   SubCutaneous three times a day before meals  lisinopril 2.5 milliGRAM(s) Oral daily  multivitamin 1 Tablet(s) Oral daily  torsemide 10 milliGRAM(s) Oral daily    MEDICATIONS  (PRN):  acetaminophen   Tablet .. 650 milliGRAM(s) Oral every 6 hours PRN Mild Pain (1 - 3), Moderate Pain (4 - 6)    ___________  Active diet:  Diet, Regular:   Consistent Carbohydrate Evening Snack (CSTCHOSN)  1000mL Fluid Restriction ([id number])  Supplement Feeding Modality:  Oral  Nepro Cans or Servings Per Day:  3       Frequency:  Three Times a day  ___________________    ==================>> VITAL SIGNS <<==================    Vital Signs Last 24 HrsT(C): 36.3 (12-26-20 @ 18:48)  T(F): 97.3 (12-26-20 @ 18:48), Max: 97.9 (12-26-20 @ 13:33)  HR: 83 (12-26-20 @ 18:48) (83 - 99)  BP: 96/60 (12-26-20 @ 18:48)  RR: 18 (12-26-20 @ 18:48) (16 - 18)  SpO2: 99% (12-26-20 @ 18:48) (98% - 100%)    CAPILLARY BLOOD GLUCOSE      POCT Blood Glucose.: 220 mg/dL (26 Dec 2020 16:56)  POCT Blood Glucose.: 149 mg/dL (26 Dec 2020 12:38)  POCT Blood Glucose.: 166 mg/dL (26 Dec 2020 08:09)  POCT Blood Glucose.: 153 mg/dL (25 Dec 2020 21:29)     ==================>> LAB AND IMAGING <<==================                        8.5    3.38  )-----------( 61       ( 26 Dec 2020 05:50 )             25.4        12-26    126<L>  |  94<L>  |  42<H>  ----------------------------<  147<H>  4.7   |  19<L>  |  1.32<H>    Ca    9.3      26 Dec 2020 05:50      WBC count:   3.38 <<== ,  3.52 <<== ,  4.98 <<== ,  3.67 <<== ,  3.89 <<== ,  2.74 <<==   Hemoglobin:   8.5 <<==,  8.4 <<==,  9.4 <<==,  8.0 <<==,  8.1 <<==,  6.6 <<==  platelets:  61 <==, 56 <==, 61 <==, 48 <==, 48 <==, 48 <==, 47 <==    Creatinine:  1.32  <<==, 1.38  <<==, 1.38  <<==, 1.53  <<==, 1.57  <<==, 1.58  <<==  Sodium:   126  <==, 125  <==, 124  <==, 120  <==, 119  <==, 115  <==, 118  <==       AST:          186 <== , 208 <==      ALT:        317  <== , 332  <==      AP:        47  <=, 45  <=     Bili:        0.5  <=, 0.6  <=     < from: US Abdomen Complete (US Abdomen Complete .) (12.21.20 @ 13:50) >  IMPRESSION:  Coarsened echotexture of the liver, may reflect underlying parenchymal disease.  Cholelithiasis.  Trace ascites. Bilateral pleural effusions.  No hydronephrosis.  < end of copied text >    < from: TTE with Doppler (w/Cont) (12.21.20 @ 05:10) >  Conclusions:  Endocardial visualization enhanced with intravenous  injection of Ultrasonic Enhancing Agent (Definity).  Severely dilated left ventricle.  Severely reduced left ventricular systolic function. Large  area of apical akinesis.  Severe mitral regurgitation.  Severe pulmonary hypertension.  ------------------------------------------------------------------------  Confirmed on  12/21/2020 - 10:50:30 by Jorge Salas MD,  FARIDEH  < end of copied text >    ___________________________________________________________________________________  ===============>>  A S S E S S M E N T   A N D   P L A N <<===============  ------------------------------------------------------------------------------------------    Pt is a 89 y/o woman, reportedly A&OX3, with pmh of ckd, htn, chf, hld, htn, dm, pw bradycardia and lethargy.   reports pt has been complaining of weakness, malaise, mild cp and sob. pt has been receiving diuretics and nitro from family. ems found pt to have bradycardia in 40s, junctional, received .5 atropine w/ improvement to 70s.     Problem/Plan - 1:  ·  Problem:  BECKY on CKD ( unknown baseline)     overall improved : likely at baseline     judicial use of diuretics for CHF      resumed lasix, not aldactone   resumed low dose ACEI  monitor BMP     Problem/Plan - 2:  ·  Problem: Hyperkalemia  improved post treatment    diuretics as above   encourage PO hydration  monitor BMP      Problem/Plan - 3:  ·  Problem: Hyponatremia.    likely chronic, stable     partly related to severe CHF   encourage PO intake   monitor BMP     Problem/Plan - 4:  ·  Problem: Bradycardia.     improved as potassium improved  monitor HR on tele  resumed coreg low dose for CHF   cardio f/u     Problem/Plan - 5:  ·  Problem: Type 2 diabetes mellitus with other specified complication, without long-term current use of insulin.  Plan: hold oral meds  monitor FS  RISS    Problem/Plan - 6:  Problem: pt with chronic systolic HF     Echo as above showing severe chronic systolic CHF with severe MR and severe pulmonary HTN   resumed for DC planing as above      no AC per D/W cardio   tele  cardio follow up and mgmt   overall poor prognosis    Problem/Plan - 7:  Problem: anemia of chronic disease     per pt's Dtr pt receives weekly procrit 40K>> ordered one dose   no sings of bleeding at  this time   monitor     Problem/Plan - 8:  Problem: Transaminitis stable   suspect due to hypoperfusion in setting of bradycardia and CHF / congestive hepatopathy  abdominal sono noted   monitor        AST:          186 <== , 208 <==      ALT:        317  <== , 332  <==      AP:        47  <=, 45  <=     Bili:        0.5  <=, 0.6  <=    -GI/DVT Prophylaxis per protocol.    venodynes          Dtr declined hospice : [facility] being set up to go home likely tomorrow   ___________________________  H. LETITIA Diaz.  Pager: [phone]

## 2020-12-26 NOTE — CHART NOTE - NSCHARTNOTEFT_GEN_A_CORE
NP note- refused hospice.    pt's daughter (Eve Israel) refused hospice care and pt & daughter understood poor prognosis. Dr. Villasenor made aware. will d/c pt to home w/ home care. CM notified.     NP. Malia renner   [id number]

## 2020-12-27 LAB
ANION GAP SERPL CALC-SCNC: 12 MMOL/L — SIGNIFICANT CHANGE UP (ref 5–17)
BUN SERPL-MCNC: 47 MG/DL — HIGH (ref 7–23)
CALCIUM SERPL-MCNC: 8.8 MG/DL — SIGNIFICANT CHANGE UP (ref 8.4–10.5)
CHLORIDE SERPL-SCNC: 92 MMOL/L — LOW (ref 96–108)
CO2 SERPL-SCNC: 19 MMOL/L — LOW (ref 22–31)
CREAT SERPL-MCNC: 1.42 MG/DL — HIGH (ref 0.5–1.3)
GLUCOSE BLDC GLUCOMTR-MCNC: 158 MG/DL — HIGH (ref 70–99)
GLUCOSE BLDC GLUCOMTR-MCNC: 174 MG/DL — HIGH (ref 70–99)
GLUCOSE BLDC GLUCOMTR-MCNC: 185 MG/DL — HIGH (ref 70–99)
GLUCOSE BLDC GLUCOMTR-MCNC: 242 MG/DL — HIGH (ref 70–99)
GLUCOSE SERPL-MCNC: 158 MG/DL — HIGH (ref 70–99)
HCT VFR BLD CALC: 24.2 % — LOW (ref 34.5–45)
HGB BLD-MCNC: 8.2 G/DL — LOW (ref 11.5–15.5)
MCHC RBC-ENTMCNC: 33.6 PG — SIGNIFICANT CHANGE UP (ref 27–34)
MCHC RBC-ENTMCNC: 33.9 GM/DL — SIGNIFICANT CHANGE UP (ref 32–36)
MCV RBC AUTO: 99.2 FL — SIGNIFICANT CHANGE UP (ref 80–100)
NRBC # BLD: 0 /100 WBCS — SIGNIFICANT CHANGE UP (ref 0–0)
PLATELET # BLD AUTO: 54 K/UL — LOW (ref 150–400)
POTASSIUM SERPL-MCNC: 4.9 MMOL/L — SIGNIFICANT CHANGE UP (ref 3.5–5.3)
POTASSIUM SERPL-SCNC: 4.9 MMOL/L — SIGNIFICANT CHANGE UP (ref 3.5–5.3)
RBC # BLD: 2.44 M/UL — LOW (ref 3.8–5.2)
RBC # FLD: 14.6 % — HIGH (ref 10.3–14.5)
SODIUM SERPL-SCNC: 123 MMOL/L — LOW (ref 135–145)
WBC # BLD: 3.55 K/UL — LOW (ref 3.8–10.5)
WBC # FLD AUTO: 3.55 K/UL — LOW (ref 3.8–10.5)

## 2020-12-27 RX ORDER — METOPROLOL TARTRATE 50 MG
25 TABLET ORAL DAILY
Refills: 0 | Status: DISCONTINUED | OUTPATIENT
Start: 2020-12-27 | End: 2020-12-28

## 2020-12-27 RX ADMIN — LISINOPRIL 2.5 MILLIGRAM(S): 2.5 TABLET ORAL at 06:18

## 2020-12-27 RX ADMIN — FAMOTIDINE 20 MILLIGRAM(S): 10 INJECTION INTRAVENOUS at 11:52

## 2020-12-27 RX ADMIN — Medication 1: at 08:59

## 2020-12-27 RX ADMIN — Medication 1 TABLET(S): at 11:52

## 2020-12-27 RX ADMIN — CARVEDILOL PHOSPHATE 3.12 MILLIGRAM(S): 80 CAPSULE, EXTENDED RELEASE ORAL at 06:18

## 2020-12-27 RX ADMIN — Medication 25 MILLIGRAM(S): at 11:52

## 2020-12-27 RX ADMIN — Medication 2: at 11:53

## 2020-12-27 RX ADMIN — Medication 1: at 17:02

## 2020-12-27 RX ADMIN — Medication 10 MILLIGRAM(S): at 06:18

## 2020-12-27 NOTE — DISCHARGE NOTE PROVIDER - CARE PROVIDER_API CALL
Dee Uribe  CARDIOVASCULAR DISEASE  [address], [address]  [place] [address]  Phone: [phone]  Fax: [phone]  Follow Up Time: 2 weeks    EN NEELY  [id number]  [address] [place] [id number]  Phone: ()-  Fax: ()-  Follow Up Time: 2 weeks

## 2020-12-27 NOTE — DISCHARGE NOTE PROVIDER - NSDCCPCAREPLAN_GEN_ALL_CORE_FT
PRINCIPAL DISCHARGE DIAGNOSIS  Diagnosis: Hyperkalemia  Assessment and Plan of Treatment: improving.   continue with torsemide.  follow up with your PCP to monitor potssium level.      SECONDARY DISCHARGE DIAGNOSES  Diagnosis: BECKY (acute kidney injury)  Assessment and Plan of Treatment: Avoid taking (NSAIDs) - (ex: Ibuprofen, Advil, Celebrex, Naprosyn)  Avoid taking any nephrotoxic agents (can harm kidneys) - Intravenous contrast for diagnostic testing, combination cold medications.  Have all medications adjusted for your renal function by your Health Care Provider.  Blood pressure control is important.  Take all medication as prescribed.      Diagnosis: Bradycardia  Assessment and Plan of Treatment: no event on tele.  switched coreg to lopressor.      Diagnosis: Chronic systolic HF (heart failure)  Assessment and Plan of Treatment: Weigh yourself daily.  If you gain 3lbs in 3 days, or 5lbs in a week call your Health Care Provider.  Do not eat or drink foods containing more than 2000mg of salt (sodium) in your diet every day.  Call your Health Care Provider if you have any swelling or increased swelling in your feet, ankles, and/or stomach.  Take all of your medication as directed.  If you become dizzy call your Health Care Provider.      Diagnosis: Anemia  Assessment and Plan of Treatment: stable.  continue home procirt 40k weekly.      Diagnosis: Transaminitis  Assessment and Plan of Treatment: Likely from hypoperfusion in setting of bradycardia and CHF / congestive hepatopathy.  abdominal sono  showing trace of ascites.       Diagnosis: Hyponatremia  Assessment and Plan of Treatment: continue sodium tab.  improving.   please follow up with your PCP to monitor sodium level.

## 2020-12-27 NOTE — DISCHARGE NOTE PROVIDER - HOSPITAL COURSE
89 y/o woman, reportedly A&OX3, with a pmhx of ckd, htn, chf, hld, htn, dm, pw bradycardia and lethargy. pt reports pt has been complaining of weakness, malaise, mild cp and sob. pt has been receiving diuretics and nitro from family. EMS found pt to have bradycardia in 40s, junctional, received .5 atropine w/ improvement to 70s. seen by cardiology. pt is on tele, no more events of bradycardia. switched coreg to lopressor  Found BECKY on CKD, overall improved, resumed lasix and low dose of ACEI, not aldactone. pt had hyperkalemia and hyponatremia, started sodium tab & diuretics and lytes are improving.   pt has hx of chronic sys CHF. Echo shows severe chronic systolic CHF with severe MR and severe pulmonary HTN , c/w diuretic. Found Transaminitis likely from hypoperfusion in setting of bradycardia and CHF / congestive hepatopathy, now stable. US of abd showing trace ascites and Bilateral pleural effusions.    overall poor prognosis, offered home hospice however pt's daughter (Eve Israel) refused it.   CM will arrange home care.   pt remains stable for DC. will follow up with PCP and cardiology.

## 2020-12-27 NOTE — PROGRESS NOTE ADULT - ASSESSMENT
_________________________________________________________________________________________  ========>>  M E D I C A L   A T T E N D I N G    F O L L O W  U P  N O T E  <<=========  -----------------------------------------------------------------------------------------------------    - Patient seen and examined by me earlier today.   - In summary,  JEANNA LUNA is a 89y year old woman admitted with lethargy  - Patient today overall doing ok, comfortable, eating ok       Dtr declined hospice : HHA being set up to go home likely tomorrow     ==================>> REVIEW OF SYSTEM <<=================    limited ROS   GEN: no pain  RESP: no SOB, no cough  CVS: no chest pain  GI: no abdominal pain  : no dysuria  Neuro: no headache    ==================>> PHYSICAL EXAM <<=================    GEN: Alert and responsive, , NAD , comfortable in bed  HEENT: NCAT, PERRL, MMM, hearing intact  Neck: supple , no JVD appreciated  CVS: S1S2 , regular , No M/R/G appreciated   PULM: CTA B/L,  no W/R/R appreciated  ABD.: soft. non tender, non distended,  bowel sounds present  Extrem: intact pulses , no edema   PSYCH : normal mood,  not anxious       ==================>> MEDICATIONS <<====================    dextrose 40% Gel 15 Gram(s) Oral once  dextrose 5%. 1000 milliLiter(s) IV Continuous <Continuous>  dextrose 5%. 1000 milliLiter(s) IV Continuous <Continuous>  dextrose 50% Injectable 25 Gram(s) IV Push once  dextrose 50% Injectable 12.5 Gram(s) IV Push once  dextrose 50% Injectable 25 Gram(s) IV Push once  famotidine    Tablet 20 milliGRAM(s) Oral daily  glucagon  Injectable 1 milliGRAM(s) IntraMuscular once  insulin lispro (ADMELOG) corrective regimen sliding scale   SubCutaneous three times a day before meals  lisinopril 2.5 milliGRAM(s) Oral daily  metoprolol succinate ER 25 milliGRAM(s) Oral daily  multivitamin 1 Tablet(s) Oral daily  torsemide 10 milliGRAM(s) Oral daily    MEDICATIONS  (PRN):  acetaminophen   Tablet .. 650 milliGRAM(s) Oral every 6 hours PRN Mild Pain (1 - 3), Moderate Pain (4 - 6)    ___________  Active diet:  Diet, Regular:   Consistent Carbohydrate Evening Snack (CSTCHOSN)  1000mL Fluid Restriction ([id number])  Supplement Feeding Modality:  Oral  Nepro Cans or Servings Per Day:  3       Frequency:  Three Times a day  ___________________    ==================>> VITAL SIGNS <<==================    Vital Signs Last 24 HrsT(C): 36.6 (12-27-20 @ 11:45)  T(F): 97.8 (12-27-20 @ 11:45), Max: 97.8 (12-27-20 @ 11:45)  HR: 77 (12-27-20 @ 11:45) (77 - 99)  BP: 102/53 (12-27-20 @ 11:45)  RR: 18 (12-27-20 @ 11:45) (16 - 18)  SpO2: 100% (12-27-20 @ 11:45) (98% - 100%)      POCT Blood Glucose.: 242 mg/dL (27 Dec 2020 11:50)  POCT Blood Glucose.: 174 mg/dL (27 Dec 2020 08:58)  POCT Blood Glucose.: 237 mg/dL (26 Dec 2020 21:18)  POCT Blood Glucose.: 220 mg/dL (26 Dec 2020 16:56)     ==================>> LAB AND IMAGING <<==================                        8.2    3.55  )-----------( 54       ( 27 Dec 2020 05:19 )             24.2        12-27    123<L>  |  92<L>  |  47<H>  ----------------------------<  158<H>  4.9   |  19<L>  |  1.42<H>    Ca    8.8      27 Dec 2020 05:19      WBC count:   3.55 <<== ,  3.38 <<== ,  3.52 <<== ,  4.98 <<== ,  3.67 <<== ,  3.89 <<==   Hemoglobin:   8.2 <<==,  8.5 <<==,  8.4 <<==,  9.4 <<==,  8.0 <<==,  8.1 <<==  platelets:  54 <==, 61 <==, 56 <==, 61 <==, 48 <==, 48 <==, 48 <==    Creatinine:  1.42  <<==, 1.32  <<==, 1.38  <<==, 1.38  <<==, 1.53  <<==, 1.57  <<==  Sodium:   123  <==, 126  <==, 125  <==, 124  <==, 120  <==, 119  <==, 115  <==           < from: US Abdomen Complete (US Abdomen Complete .) (12.21.20 @ 13:50) >  IMPRESSION:  Coarsened echotexture of the liver, may reflect underlying parenchymal disease.  Cholelithiasis.  Trace ascites. Bilateral pleural effusions.  No hydronephrosis.  < end of copied text >    < from: TTE with Doppler (w/Cont) (12.21.20 @ 05:10) >  Conclusions:  Endocardial visualization enhanced with intravenous  injection of Ultrasonic Enhancing Agent (Definity).  Severely dilated left ventricle.  Severely reduced left ventricular systolic function. Large  area of apical akinesis.  Severe mitral regurgitation.  Severe pulmonary hypertension.  ------------------------------------------------------------------------  Confirmed on  12/21/2020 - 10:50:30 by Jorge Salas MD,  FARIDEH  < end of copied text >    ___________________________________________________________________________________  ===============>>  A S S E S S M E N T   A N D   P L A N <<===============  ------------------------------------------------------------------------------------------    Pt is a 89 y/o woman, reportedly A&OX3, with pmh of ckd, htn, chf, hld, htn, dm, pw bradycardia and lethargy.   reports pt has been complaining of weakness, malaise, mild cp and sob. pt has been receiving diuretics and nitro from family. ems found pt to have bradycardia in 40s, junctional, received .5 atropine w/ improvement to 70s.     Problem/Plan - 1:  ·  Problem:  BECKY on CKD ( unknown baseline)     overall improved : likely at baseline     judicial use of diuretics for CHF      meds adjusted per cardio   monitor BMP     Problem/Plan - 2:  ·  Problem: Hyperkalemia  improved post treatment       meds adjusted per cardio   encourage PO hydration  monitor BMP      Problem/Plan - 3:  ·  Problem: Hyponatremia.    likely chronic, stable     partly related to severe CHF   encourage PO intake   monitor BMP     Problem/Plan - 4:  ·  Problem: Bradycardia.     improved as potassium improved  monitor HR on tele      meds adjusted per cardio   cardio f/u appreciated     Problem/Plan - 5:  ·  Problem: Type 2 diabetes mellitus with other specified complication, without long-term current use of insulin.  Plan: hold oral meds  monitor FS  RISS    Problem/Plan - 6:  Problem: pt with chronic systolic HF     Echo as above showing severe chronic systolic CHF with severe MR and severe pulmonary HTN   resumed for DC planing as above      no AC per D/W cardio   tele  cardio follow up and mgmt   overall poor prognosis    Problem/Plan - 7:  Problem: anemia of chronic disease     per pt's Dtr pt receives weekly procrit 40K>> ordered one dose   no sings of bleeding at  this time   monitor     Problem/Plan - 8:  Problem: Transaminitis stable   suspect due to hypoperfusion in setting of bradycardia and CHF / congestive hepatopathy  abdominal sono noted   monitor    -GI/DVT Prophylaxis per protocol.    maria elena          Dtr declined hospice : HHA being set up to go home likely tomorrow if stable   ___________________________  H. LETITIA Diaz.  Pager: [phone]

## 2020-12-27 NOTE — DISCHARGE NOTE NURSING/CASE MANAGEMENT/SOCIAL WORK - PATIENT PORTAL LINK FT
You can access the FollowMyHealth Patient Portal offered by [facility] by registering at the following website: [email or web address]. By joining [facility]’s FollowMyHealth portal, you will also be able to view your health information using other applications (apps) compatible with our system.

## 2020-12-27 NOTE — DISCHARGE NOTE PROVIDER - NSDCCAREPROVSEEN_GEN_ALL_CORE_FT
Dee Uribe Hoorbod Hoorbod Delshadfar  Hoorbod Delshadfar  Hoorbod Delshadfar  Dee Roberts  [facility]

## 2020-12-27 NOTE — DISCHARGE NOTE NURSING/CASE MANAGEMENT/SOCIAL WORK - NSDPRELPAT_GEN_ALL_CORE
Patient is here for a routine OB check.  No new significant problems or changes are noted.  GBBS +, PCN in labor.  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  F/U in 1 week.   Daughter

## 2020-12-27 NOTE — DISCHARGE NOTE PROVIDER - NSDCMRMEDTOKEN_GEN_ALL_CORE_FT
carvedilol 3.125 mg oral tablet: 1 tab(s) orally 2 times a day  famotidine 20 mg oral tablet: 1 tab(s) orally once a day  glipiZIDE 2.5 mg oral tablet, extended release: 1 tab(s) orally once a day  Hospital Bed :   nitroglycerin 0.4 mg sublingual tablet: 1 tab(s) sublingual 3 times a day, As Needed  ramipril 2.5 mg oral capsule: 1 cap(s) orally once a day  spironolactone 25 mg oral tablet: 1 tab(s) orally 2 times a day    **NOTE: As per pharmacy, direction is take 25 mg 0.5 tab once daily**  torsemide 10 mg oral tablet: 1 tab(s) orally once a day  Tradjenta 5 mg oral tablet: 1 tab(s) orally once a day   famotidine 20 mg oral tablet: 1 tab(s) orally once a day  glipiZIDE 2.5 mg oral tablet, extended release: 1 tab(s) orally once a day  metoprolol succinate 25 mg oral tablet, extended release: 1 tab(s) orally once a day  Multiple Vitamins oral tablet: 1 tab(s) orally once a day  ramipril 2.5 mg oral capsule: 1 cap(s) orally once a day  torsemide 10 mg oral tablet: 1 tab(s) orally once a day  Tradjenta 5 mg oral tablet: 1 tab(s) orally once a day   cephalexin 250 mg oral capsule: 1 cap(s) orally 2 times a day   famotidine 20 mg oral tablet: 1 tab(s) orally once a day  glipiZIDE 2.5 mg oral tablet, extended release: 1 tab(s) orally once a day  metoprolol succinate 25 mg oral tablet, extended release: 1 tab(s) orally once a day  Multiple Vitamins oral tablet: 1 tab(s) orally once a day  ramipril 2.5 mg oral capsule: 1 cap(s) orally once a day  torsemide 10 mg oral tablet: 1 tab(s) orally once a day  Tradjenta 5 mg oral tablet: 1 tab(s) orally once a day

## 2020-12-28 VITALS
SYSTOLIC BLOOD PRESSURE: 101 MMHG | TEMPERATURE: 98 F | RESPIRATION RATE: 18 BRPM | DIASTOLIC BLOOD PRESSURE: 57 MMHG | OXYGEN SATURATION: 100 % | HEART RATE: 77 BPM

## 2020-12-28 LAB
APPEARANCE UR: ABNORMAL
BACTERIA # UR AUTO: ABNORMAL
BILIRUB UR-MCNC: NEGATIVE — SIGNIFICANT CHANGE UP
COLOR SPEC: ABNORMAL
DIFF PNL FLD: ABNORMAL
EPI CELLS # UR: 1 — SIGNIFICANT CHANGE UP
GLUCOSE BLDC GLUCOMTR-MCNC: 180 MG/DL — HIGH (ref 70–99)
GLUCOSE BLDC GLUCOMTR-MCNC: 197 MG/DL — HIGH (ref 70–99)
GLUCOSE UR QL: NEGATIVE — SIGNIFICANT CHANGE UP
KETONES UR-MCNC: NEGATIVE — SIGNIFICANT CHANGE UP
LEUKOCYTE ESTERASE UR-ACNC: ABNORMAL
NITRITE UR-MCNC: POSITIVE
PH UR: 6.5 — SIGNIFICANT CHANGE UP (ref 5–8)
PROT UR-MCNC: ABNORMAL
RBC CASTS # UR COMP ASSIST: 24 /HPF — HIGH (ref 0–4)
SP GR SPEC: 1.01 — LOW (ref 1.01–1.02)
UROBILINOGEN FLD QL: NEGATIVE — SIGNIFICANT CHANGE UP
WBC UR QL: 1601 /HPF — SIGNIFICANT CHANGE UP (ref 0–5)

## 2020-12-28 PROCEDURE — 82728 ASSAY OF FERRITIN: CPT

## 2020-12-28 PROCEDURE — U0003: CPT

## 2020-12-28 PROCEDURE — 82435 ASSAY OF BLOOD CHLORIDE: CPT

## 2020-12-28 PROCEDURE — 86901 BLOOD TYPING SEROLOGIC RH(D): CPT

## 2020-12-28 PROCEDURE — 99285 EMERGENCY DEPT VISIT HI MDM: CPT | Mod: 25

## 2020-12-28 PROCEDURE — 86923 COMPATIBILITY TEST ELECTRIC: CPT

## 2020-12-28 PROCEDURE — 84484 ASSAY OF TROPONIN QUANT: CPT

## 2020-12-28 PROCEDURE — 80053 COMPREHEN METABOLIC PANEL: CPT

## 2020-12-28 PROCEDURE — 83690 ASSAY OF LIPASE: CPT

## 2020-12-28 PROCEDURE — 85018 HEMOGLOBIN: CPT

## 2020-12-28 PROCEDURE — 83935 ASSAY OF URINE OSMOLALITY: CPT

## 2020-12-28 PROCEDURE — 85027 COMPLETE CBC AUTOMATED: CPT

## 2020-12-28 PROCEDURE — 96375 TX/PRO/DX INJ NEW DRUG ADDON: CPT | Mod: XU

## 2020-12-28 PROCEDURE — 84132 ASSAY OF SERUM POTASSIUM: CPT

## 2020-12-28 PROCEDURE — 80061 LIPID PANEL: CPT

## 2020-12-28 PROCEDURE — 87040 BLOOD CULTURE FOR BACTERIA: CPT

## 2020-12-28 PROCEDURE — 87186 SC STD MICRODIL/AGAR DIL: CPT

## 2020-12-28 PROCEDURE — 82330 ASSAY OF CALCIUM: CPT

## 2020-12-28 PROCEDURE — 83540 ASSAY OF IRON: CPT

## 2020-12-28 PROCEDURE — 83735 ASSAY OF MAGNESIUM: CPT

## 2020-12-28 PROCEDURE — 71045 X-RAY EXAM CHEST 1 VIEW: CPT

## 2020-12-28 PROCEDURE — 82746 ASSAY OF FOLIC ACID SERUM: CPT

## 2020-12-28 PROCEDURE — 96374 THER/PROPH/DIAG INJ IV PUSH: CPT | Mod: XU

## 2020-12-28 PROCEDURE — 83880 ASSAY OF NATRIURETIC PEPTIDE: CPT

## 2020-12-28 PROCEDURE — 81001 URINALYSIS AUTO W/SCOPE: CPT

## 2020-12-28 PROCEDURE — 84300 ASSAY OF URINE SODIUM: CPT

## 2020-12-28 PROCEDURE — 97161 PT EVAL LOW COMPLEX 20 MIN: CPT

## 2020-12-28 PROCEDURE — 84134 ASSAY OF PREALBUMIN: CPT

## 2020-12-28 PROCEDURE — P9011: CPT

## 2020-12-28 PROCEDURE — 84100 ASSAY OF PHOSPHORUS: CPT

## 2020-12-28 PROCEDURE — 87086 URINE CULTURE/COLONY COUNT: CPT

## 2020-12-28 PROCEDURE — 36430 TRANSFUSION BLD/BLD COMPNT: CPT

## 2020-12-28 PROCEDURE — 82803 BLOOD GASES ANY COMBINATION: CPT

## 2020-12-28 PROCEDURE — 80048 BASIC METABOLIC PNL TOTAL CA: CPT

## 2020-12-28 PROCEDURE — 84443 ASSAY THYROID STIM HORMONE: CPT

## 2020-12-28 PROCEDURE — 82962 GLUCOSE BLOOD TEST: CPT

## 2020-12-28 PROCEDURE — 82947 ASSAY GLUCOSE BLOOD QUANT: CPT

## 2020-12-28 PROCEDURE — 93005 ELECTROCARDIOGRAM TRACING: CPT | Mod: 76

## 2020-12-28 PROCEDURE — 51702 INSERT TEMP BLADDER CATH: CPT

## 2020-12-28 PROCEDURE — 86900 BLOOD TYPING SEROLOGIC ABO: CPT

## 2020-12-28 PROCEDURE — 82607 VITAMIN B-12: CPT

## 2020-12-28 PROCEDURE — 85014 HEMATOCRIT: CPT

## 2020-12-28 PROCEDURE — 76700 US EXAM ABDOM COMPLETE: CPT

## 2020-12-28 PROCEDURE — 86850 RBC ANTIBODY SCREEN: CPT

## 2020-12-28 PROCEDURE — 83550 IRON BINDING TEST: CPT

## 2020-12-28 PROCEDURE — 84295 ASSAY OF SERUM SODIUM: CPT

## 2020-12-28 PROCEDURE — 86985 SPLIT BLOOD OR PRODUCTS: CPT

## 2020-12-28 PROCEDURE — C8929: CPT

## 2020-12-28 PROCEDURE — 83605 ASSAY OF LACTIC ACID: CPT

## 2020-12-28 PROCEDURE — 83036 HEMOGLOBIN GLYCOSYLATED A1C: CPT

## 2020-12-28 PROCEDURE — 85025 COMPLETE CBC W/AUTO DIFF WBC: CPT

## 2020-12-28 RX ORDER — CEPHALEXIN 500 MG
1 CAPSULE ORAL
Qty: 6 | Refills: 0
Start: 2020-12-28 | End: 2020-12-30

## 2020-12-28 RX ORDER — SPIRONOLACTONE 25 MG/1
1 TABLET, FILM COATED ORAL
Qty: 0 | Refills: 0 | DISCHARGE

## 2020-12-28 RX ORDER — CARVEDILOL PHOSPHATE 80 MG/1
1 CAPSULE, EXTENDED RELEASE ORAL
Qty: 0 | Refills: 0 | DISCHARGE

## 2020-12-28 RX ORDER — CEPHALEXIN 500 MG
500 CAPSULE ORAL EVERY 12 HOURS
Refills: 0 | Status: DISCONTINUED | OUTPATIENT
Start: 2020-12-28 | End: 2020-12-28

## 2020-12-28 RX ORDER — NITROGLYCERIN 6.5 MG
1 CAPSULE, EXTENDED RELEASE ORAL
Qty: 0 | Refills: 0 | DISCHARGE

## 2020-12-28 RX ADMIN — Medication 1: at 07:43

## 2020-12-28 RX ADMIN — Medication 1: at 11:51

## 2020-12-28 RX ADMIN — FAMOTIDINE 20 MILLIGRAM(S): 10 INJECTION INTRAVENOUS at 11:52

## 2020-12-28 RX ADMIN — Medication 25 MILLIGRAM(S): at 05:12

## 2020-12-28 RX ADMIN — Medication 1 TABLET(S): at 11:52

## 2020-12-28 RX ADMIN — LISINOPRIL 2.5 MILLIGRAM(S): 2.5 TABLET ORAL at 05:12

## 2020-12-28 RX ADMIN — Medication 500 MILLIGRAM(S): at 15:05

## 2020-12-28 RX ADMIN — Medication 10 MILLIGRAM(S): at 05:12

## 2020-12-28 NOTE — PROGRESS NOTE ADULT - PROVIDER SPECIALTY LIST ADULT
Cardiology
Internal Medicine
Nephrology
Nephrology
Cardiology
Internal Medicine
Cardiology
Cardiology
Internal Medicine
Cardiology

## 2020-12-28 NOTE — PROGRESS NOTE ADULT - REASON FOR ADMISSION
lethargy, bradycardia, electrolyte abnormalities

## 2020-12-28 NOTE — PROGRESS NOTE ADULT - SUBJECTIVE AND OBJECTIVE BOX
CARDIOLOGY     PROGRESS  NOTE   ________________________________________________    CHIEF COMPLAINT:Patient is a 89y old  Female who presents with a chief complaint of lethargy, bradycardia, electrolyte abnormalities (21 Dec 2020 12:21)  doing better.  	  REVIEW OF SYSTEMS:  CONSTITUTIONAL: No fever, weight loss, or fatigue  EYES: No eye pain, visual disturbances, or discharge  ENT:  No difficulty hearing, tinnitus, vertigo; No sinus or throat pain  NECK: No pain or stiffness  RESPIRATORY: No cough, wheezing, chills or hemoptysis; No Shortness of Breath  CARDIOVASCULAR: No chest pain, palpitations, passing out, dizziness, or leg swelling  GASTROINTESTINAL: No abdominal or epigastric pain. No nausea, vomiting, or hematemesis; No diarrhea or constipation. No melena or hematochezia.  GENITOURINARY: No dysuria, frequency, hematuria, or incontinence  NEUROLOGICAL: No headaches, memory loss, loss of strength, numbness, or tremors  SKIN: No itching, burning, rashes, or lesions   LYMPH Nodes: No enlarged glands  ENDOCRINE: No heat or cold intolerance; No hair loss  MUSCULOSKELETAL: No joint pain or swelling; No muscle, back, or extremity pain  PSYCHIATRIC: No depression, anxiety, mood swings, or difficulty sleeping  HEME/LYMPH: No easy bruising, or bleeding gums  ALLERGY AND IMMUNOLOGIC: No hives or eczema	    [ ] All others negative	  [ ] Unable to obtain    PHYSICAL EXAM:  T(C): 36.7 (12-22-20 @ 04:36), Max: 36.7 (12-22-20 @ 04:36)  HR: 77 (12-22-20 @ 04:36) (74 - 87)  BP: 100/46 (12-22-20 @ 04:36) (100/46 - 106/53)  RR: 18 (12-22-20 @ 07:26) (18 - 18)  SpO2: 98% (12-22-20 @ 07:26) (97% - 99%)  Wt(kg): --  I&O's Summary    21 Dec 2020 07:01  -  22 Dec 2020 07:00  --------------------------------------------------------  IN: 360 mL / OUT: 1400 mL / NET: -1040 mL        Appearance: Normal	  HEENT:   Normal oral mucosa, PERRL, EOMI	  Lymphatic: No lymphadenopathy  Cardiovascular: Normal S1 S2, No JVD, + murmurs, No edema  Respiratory: Lungs clear to auscultation	  Gastrointestinal:  Soft, Non-tender, + BS	  Skin: No rashes, No ecchymoses, No cyanosis	  Neurologic: Non-focal  Extremities: Normal range of motion, No clubbing, cyanosis or edema  Vascular: Peripheral pulses palpable 2+ bilaterally    MEDICATIONS  (STANDING):  carvedilol 3.125 milliGRAM(s) Oral every 12 hours  dextrose 40% Gel 15 Gram(s) Oral once  dextrose 5%. 1000 milliLiter(s) (50 mL/Hr) IV Continuous <Continuous>  dextrose 5%. 1000 milliLiter(s) (100 mL/Hr) IV Continuous <Continuous>  dextrose 5%. 1000 milliLiter(s) (200 mL/Hr) IV Continuous <Continuous>  dextrose 50% Injectable 25 Gram(s) IV Push once  dextrose 50% Injectable 12.5 Gram(s) IV Push once  dextrose 50% Injectable 25 Gram(s) IV Push once  famotidine    Tablet 20 milliGRAM(s) Oral daily  glucagon  Injectable 1 milliGRAM(s) IntraMuscular once  insulin lispro (ADMELOG) corrective regimen sliding scale   SubCutaneous three times a day before meals  multivitamin 1 Tablet(s) Oral daily      TELEMETRY: 	    ECG:  	  RADIOLOGY:  OTHER: 	  	  LABS:	 	    CARDIAC MARKERS:                                6.9    3.11  )-----------( 47       ( 22 Dec 2020 02:54 )             19.4     12-22    120<LL>  |  85<L>  |  56<H>  ----------------------------<  98  4.3   |  19<L>  |  1.85<H>    Ca    9.4      22 Dec 2020 02:54  Phos  4.6     12-21  Mg     2.1     12-21    TPro  5.4<L>  /  Alb  3.6  /  TBili  0.6  /  DBili  x   /  AST  208<H>  /  ALT  332<H>  /  AlkPhos  45  12-22    proBNP: Serum Pro-Brain Natriuretic Peptide: 79206 pg/mL (12-20 @ 10:52)    Lipid Profile: Cholesterol 124  LDL --  HDL 39      HgA1c:   TSH: Thyroid Stimulating Hormone, Serum: 4.73 uIU/mL (12-21 @ 07:23)    - Pt with ? hypovolemic hyponatremia as Na improved with IVF, received 480cc total of NS. Na 108 on admission to now 119 in less than 24hrs. Stopped NS and started D5W at 100cc/hr. Would not give HTS in future as with eGFR~10 you will not have a strong response to ADH and SIADH is extremely unlikely with such eGFR  - Monitor UOP closely, if not drastic increase in UOP to >100cc/hr please check a stat BMP  - For now monitor BMP q 6hrs  - No need for fluid restriction at this time  - Check TTE  - Do not overcorrect Na more than 6-8meq/24hrs     #BECKY  - Suspect patient likely with pre-renal vs. ATN vs. HF exacerbation (less likely). Clinically does not appear volume overloaded on exam but arrived with bradycardia, hyperkalemia, BECKY on CKD which could have been in setting of excessive medication (coreq/torsemide/bob).  - Improved slightly with IVF, urine NA <35 which could be consistent with pre-renal vs. HF   - Recommend to hold further IVF as NA rapidly correcting  - Would c/w [facility], monitor UOP closely, monitor BMP q 6hrs for today  - Repeat urine Na  - Check TTE  - Dose medications to eGFR<10      Assessment and plan  ---------------------------  pt poor historian ( and [place], despite interpretor use) , family not available, info per ED chart   Pt is a 89 y/o woman, reportedly A&OX3, with pmh of ckd, htn, chf, hld, htn, dm, pw bradycardia and lethargy.   reports pt has been complaining of weakness, malaise, mild cp and sob. pt has been receiving diuretics and nitro from family. ems found pt to have bradycardia in 40s, junctional, received .5 atropine w/ improvement to 70s. denies f/c.   per family pt did not have formal MOLST however pts HCP stated to ER pt is dnr/dni ( no order placed: needs to be confirmed...)  pt with sig medical and cardiac hx with bradycardia and hyponatremia.  unknown pt meds at home.  renal eval for hypo ?dehydration  echo  no further bradycardia on tele  avoid av blocking agents  tsh  keep hgb>7  hold coreg for now  fu trenal function    	        
           CARDIOLOGY     PROGRESS  NOTE   ________________________________________________    CHIEF COMPLAINT:Patient is a 89y old  Female who presents with a chief complaint of lethargy, bradycardia, electrolyte abnormalities (22 Dec 2020 18:14)  no complain.  	  REVIEW OF SYSTEMS:  CONSTITUTIONAL: No fever, weight loss, or fatigue  EYES: No eye pain, visual disturbances, or discharge  ENT:  No difficulty hearing, tinnitus, vertigo; No sinus or throat pain  NECK: No pain or stiffness  RESPIRATORY: No cough, wheezing, chills or hemoptysis; No Shortness of Breath  CARDIOVASCULAR: No chest pain, palpitations, passing out, dizziness, or leg swelling  GASTROINTESTINAL: No abdominal or epigastric pain. No nausea, vomiting, or hematemesis; No diarrhea or constipation. No melena or hematochezia.  GENITOURINARY: No dysuria, frequency, hematuria, or incontinence  NEUROLOGICAL: No headaches, memory loss, loss of strength, numbness, or tremors  SKIN: No itching, burning, rashes, or lesions   LYMPH Nodes: No enlarged glands  ENDOCRINE: No heat or cold intolerance; No hair loss  MUSCULOSKELETAL: No joint pain or swelling; No muscle, back, or extremity pain  PSYCHIATRIC: No depression, anxiety, mood swings, or difficulty sleeping  HEME/LYMPH: No easy bruising, or bleeding gums  ALLERGY AND IMMUNOLOGIC: No hives or eczema	    [ ] All others negative	  [ ] Unable to obtain    PHYSICAL EXAM:  T(C): 36.6 (12-23-20 @ 04:18), Max: 36.9 (12-22-20 @ 12:09)  HR: 82 (12-23-20 @ 04:18) (76 - 84)  BP: 107/58 (12-23-20 @ 04:18) (92/52 - 114/66)  RR: 18 (12-23-20 @ 04:18) (18 - 18)  SpO2: 99% (12-23-20 @ 04:18) (98% - 100%)  Wt(kg): --  I&O's Summary    22 Dec 2020 07:01  -  23 Dec 2020 07:00  --------------------------------------------------------  IN: 520 mL / OUT: 1125 mL / NET: -605 mL        Appearance: Normal	  HEENT:   Normal oral mucosa, PERRL, EOMI	  Lymphatic: No lymphadenopathy  Cardiovascular: Normal S1 S2, No JVD, + murmurs, No edema  Respiratory: Lungs clear to auscultation	  Gastrointestinal:  Soft, Non-tender, + BS	  Skin: No rashes, No ecchymoses, No cyanosis	  Neurologic: Non-focal  Extremities: Normal range of motion, No clubbing, cyanosis or edema  Vascular: Peripheral pulses palpable 2+ bilaterally    MEDICATIONS  (STANDING):  dextrose 40% Gel 15 Gram(s) Oral once  dextrose 5%. 1000 milliLiter(s) (50 mL/Hr) IV Continuous <Continuous>  dextrose 5%. 1000 milliLiter(s) (100 mL/Hr) IV Continuous <Continuous>  dextrose 50% Injectable 25 Gram(s) IV Push once  dextrose 50% Injectable 12.5 Gram(s) IV Push once  dextrose 50% Injectable 25 Gram(s) IV Push once  famotidine    Tablet 20 milliGRAM(s) Oral daily  glucagon  Injectable 1 milliGRAM(s) IntraMuscular once  insulin lispro (ADMELOG) corrective regimen sliding scale   SubCutaneous three times a day before meals  multivitamin 1 Tablet(s) Oral daily  sodium chloride 1.5%. 500 milliLiter(s) (50 mL/Hr) IV Continuous <Continuous>      TELEMETRY: nsr 70 to 80	    ECG:  	  RADIOLOGY:  OTHER: 	  	  LABS:	 	    CARDIAC MARKERS:                                8.0    3.67  )-----------( 48       ( 23 Dec 2020 05:40 )             23.0     12-23    120<LL>  |  89<L>  |  46<H>  ----------------------------<  119<H>  4.4   |  18<L>  |  1.53<H>    Ca    9.2      23 Dec 2020 05:40  Mg     1.8     12-23    TPro  5.3<L>  /  Alb  3.5  /  TBili  0.5  /  DBili  x   /  AST  186<H>  /  ALT  317<H>  /  AlkPhos  47  12-22    proBNP: Serum Pro-Brain Natriuretic Peptide: 82909 pg/mL (12-20 @ 10:52)    Lipid Profile: Cholesterol 124  LDL --  HDL 39      HgA1c:   TSH: Thyroid Stimulating Hormone, Serum: 4.73 uIU/mL (12-21 @ 07:23)  < from: TTE with Doppler (w/Cont) (12.21.20 @ 05:10) >  Endocardial visualization enhanced with intravenous  injection of Ultrasonic Enhancing Agent (Definity).  Severely dilated left ventricle.  Severely reduced left ventricular systolic function. Large  area of apical akinesis.  Severe mitral regurgitation.  Severe pulmonary hypertension.    < end of copied text >          Assessment and plan  ---------------------------  pt poor historian ( and [place], despite interpretor use) , family not available, info per ED chart   Pt is a 89 y/o woman, reportedly A&OX3, with pmh of ckd, htn, chf, hld, htn, dm, pw bradycardia and lethargy.   reports pt has been complaining of weakness, malaise, mild cp and sob. pt has been receiving diuretics and nitro from family. ems found pt to have bradycardia in 40s, junctional, received .5 atropine w/ improvement to 70s. denies f/c.   per family pt did not have formal MOLST however pts HCP stated to ER pt is dnr/dni ( no order placed: needs to be confirmed...)  pt with sig medical and cardiac hx with bradycardia and hyponatremia.  unknown pt meds at home.  renal eval for hypo ?dehydration  echo  no further bradycardia on tele  avoid av blocking agents  tsh  keep hgb>7  hold coreg for now  fu renal  function  echo noted with severe lv dysfunction and severe MR., hyponatremia sec to sig chf and lv dysfunction  ?starting pos inotrope  prognosis is very poor ?palliative consult    	        
           CARDIOLOGY     PROGRESS  NOTE   ________________________________________________    CHIEF COMPLAINT:Patient is a 89y old  Female who presents with a chief complaint of lethargy, bradycardia, electrolyte abnormalities (23 Dec 2020 19:24)  no complain.  	  REVIEW OF SYSTEMS:  CONSTITUTIONAL: No fever, weight loss, or fatigue  EYES: No eye pain, visual disturbances, or discharge  ENT:  No difficulty hearing, tinnitus, vertigo; No sinus or throat pain  NECK: No pain or stiffness  RESPIRATORY: No cough, wheezing, chills or hemoptysis; No Shortness of Breath  CARDIOVASCULAR: No chest pain, palpitations, passing out, dizziness, or leg swelling  GASTROINTESTINAL: No abdominal or epigastric pain. No nausea, vomiting, or hematemesis; No diarrhea or constipation. No melena or hematochezia.  GENITOURINARY: No dysuria, frequency, hematuria, or incontinence  NEUROLOGICAL: No headaches, memory loss, loss of strength, numbness, or tremors  SKIN: No itching, burning, rashes, or lesions   LYMPH Nodes: No enlarged glands  ENDOCRINE: No heat or cold intolerance; No hair loss  MUSCULOSKELETAL: No joint pain or swelling; No muscle, back, or extremity pain  PSYCHIATRIC: No depression, anxiety, mood swings, or difficulty sleeping  HEME/LYMPH: No easy bruising, or bleeding gums  ALLERGY AND IMMUNOLOGIC: No hives or eczema	    [ ] All others negative	  x[ ] Unable to obtain    PHYSICAL EXAM:  T(C): 36.3 (12-24-20 @ 05:40), Max: 36.9 (12-23-20 @ 22:14)  HR: 90 (12-24-20 @ 05:40) (88 - 97)  BP: 116/61 (12-24-20 @ 05:40) (106/61 - 118/66)  RR: 16 (12-24-20 @ 05:40) (16 - 18)  SpO2: 100% (12-24-20 @ 05:40) (97% - 100%)  Wt(kg): --  I&O's Summary    23 Dec 2020 07:01  -  24 Dec 2020 07:00  --------------------------------------------------------  IN: 657 mL / OUT: 0 mL / NET: 657 mL        Appearance: Normal	  HEENT:   Normal oral mucosa, PERRL, EOMI	  Lymphatic: No lymphadenopathy  Cardiovascular: Normal S1 S2, No JVD, + murmurs, + edema  Respiratory: Lungs clear to auscultation	  Psychiatry: A & O x 3, Mood & affect appropriate  Gastrointestinal:  Soft, Non-tender, + BS	  Skin: No rashes, No ecchymoses, No cyanosis	  Neurologic: Non-focal  Extremities: Normal range of motion, No clubbing, cyanosis . + edema  Vascular: Peripheral pulses palpable 2+ bilaterally    MEDICATIONS  (STANDING):  dextrose 40% Gel 15 Gram(s) Oral once  dextrose 5%. 1000 milliLiter(s) (50 mL/Hr) IV Continuous <Continuous>  dextrose 5%. 1000 milliLiter(s) (100 mL/Hr) IV Continuous <Continuous>  dextrose 50% Injectable 25 Gram(s) IV Push once  dextrose 50% Injectable 12.5 Gram(s) IV Push once  dextrose 50% Injectable 25 Gram(s) IV Push once  famotidine    Tablet 20 milliGRAM(s) Oral daily  glucagon  Injectable 1 milliGRAM(s) IntraMuscular once  insulin lispro (ADMELOG) corrective regimen sliding scale   SubCutaneous three times a day before meals  multivitamin 1 Tablet(s) Oral daily      TELEMETRY: 	    ECG:  	  RADIOLOGY:  OTHER: 	  	  LABS:	 	    CARDIAC MARKERS:                                9.4    4.98  )-----------( 61       ( 24 Dec 2020 06:11 )             26.4     12-24    124<L>  |  91<L>  |  48<H>  ----------------------------<  121<H>  4.3   |  20<L>  |  1.38<H>    Ca    9.6      24 Dec 2020 06:11  Mg     2.0     12-24    TPro  5.3<L>  /  Alb  3.5  /  TBili  0.5  /  DBili  x   /  AST  186<H>  /  ALT  317<H>  /  AlkPhos  47  12-22    proBNP: Serum Pro-Brain Natriuretic Peptide: 98530 pg/mL (12-20 @ 10:52)    Lipid Profile: Cholesterol 124  LDL --  HDL 39      HgA1c:   TSH: Thyroid Stimulating Hormone, Serum: 4.73 uIU/mL (12-21 @ 07:23)  Discussed in depth with patient's daughter, Eve Durbin with patient at bedside. Per daughter, patient and family aware of multiple medical comorbidities which means that patient's time is limited. Confirmed that patient is DNR/I, no heroic measures. MOLST completed. Discussed Hospice services. Daughter agreeable to hospice referral and requested mandarin speaking aide If possible.  Diagnosis; MOLST Discussed; Treatment Options; Hospice Referral          Assessment and plan  ---------------------------  pt poor historian ( and [place], despite interpretor use) , family not available, info per ED chart   Pt is a 89 y/o woman, reportedly A&OX3, with pmh of ckd, htn, chf, hld, htn, dm, pw bradycardia and lethargy.   reports pt has been complaining of weakness, malaise, mild cp and sob. pt has been receiving diuretics and nitro from family. ems found pt to have bradycardia in 40s, junctional, received .5 atropine w/ improvement to 70s. denies f/c.   per family pt did not have formal MOLST however pts HCP stated to ER pt is dnr/dni ( no order placed: needs to be confirmed...)  pt with sig medical and cardiac hx with bradycardia and hyponatremia.  unknown pt meds at home.  renal eval for hypo ?dehydration  echo  no further bradycardia on tele  avoid av blocking agents  tsh  keep hgb>7  hold coreg for now  fu renal  function  echo noted with severe lv dysfunction and severe MR., hyponatremia sec to sig chf and lv dysfunction  ?starting pos inotrope  prognosis is very poor / palliative consult appreciated  dc tele  will add cardiac meds as tolerated    	        
           CARDIOLOGY     PROGRESS  NOTE   ________________________________________________    CHIEF COMPLAINT:Patient is a 89y old  Female who presents with a chief complaint of lethargy, bradycardia, electrolyte abnormalities (26 Dec 2020 15:45)  no complain.  	  REVIEW OF SYSTEMS:  CONSTITUTIONAL: No fever, weight loss, or fatigue  EYES: No eye pain, visual disturbances, or discharge  ENT:  No difficulty hearing, tinnitus, vertigo; No sinus or throat pain  NECK: No pain or stiffness  RESPIRATORY: No cough, wheezing, chills or hemoptysis; + mild  Shortness of Breath  CARDIOVASCULAR: No chest pain, palpitations, passing out, dizziness, or leg swelling  GASTROINTESTINAL: No abdominal or epigastric pain. No nausea, vomiting, or hematemesis; No diarrhea or constipation. No melena or hematochezia.  GENITOURINARY: No dysuria, frequency, hematuria, or incontinence  NEUROLOGICAL: No headaches, memory loss, loss of strength, numbness, or tremors  SKIN: No itching, burning, rashes, or lesions   LYMPH Nodes: No enlarged glands  ENDOCRINE: No heat or cold intolerance; No hair loss  MUSCULOSKELETAL: No joint pain or swelling; No muscle, back, or extremity pain  PSYCHIATRIC: No depression, anxiety, mood swings, or difficulty sleeping  HEME/LYMPH: No easy bruising, or bleeding gums  ALLERGY AND IMMUNOLOGIC: No hives or eczema	    [ ] All others negative	  [ ] Unable to obtain    PHYSICAL EXAM:  T(C): 36.4 (12-27-20 @ 04:16), Max: 36.6 (12-26-20 @ 13:33)  HR: 83 (12-27-20 @ 04:16) (83 - 99)  BP: 109/59 (12-27-20 @ 04:16) (96/60 - 109/70)  RR: 18 (12-27-20 @ 04:16) (16 - 18)  SpO2: 98% (12-27-20 @ 04:16) (98% - 100%)  Wt(kg): --  I&O's Summary    26 Dec 2020 07:01  -  27 Dec 2020 07:00  --------------------------------------------------------  IN: 640 mL / OUT: 400 mL / NET: 240 mL        Appearance: Normal	  HEENT:   Normal oral mucosa, PERRL, EOMI	  Lymphatic: No lymphadenopathy  Cardiovascular: Normal S1 S2, No JVD, + murmurs, No edema  Respiratory: Lungs clear to auscultation	  Psychiatry: A & O x 3, Mood & affect appropriate  Gastrointestinal:  Soft, Non-tender, + BS	  Skin: No rashes, No ecchymoses, No cyanosis	  Neurologic: Non-focal  Extremities: Normal range of motion, No clubbing, cyanosis or edema  Vascular: Peripheral pulses palpable 2+ bilaterally    MEDICATIONS  (STANDING):  carvedilol 3.125 milliGRAM(s) Oral every 12 hours  dextrose 40% Gel 15 Gram(s) Oral once  dextrose 5%. 1000 milliLiter(s) (50 mL/Hr) IV Continuous <Continuous>  dextrose 5%. 1000 milliLiter(s) (100 mL/Hr) IV Continuous <Continuous>  dextrose 50% Injectable 25 Gram(s) IV Push once  dextrose 50% Injectable 12.5 Gram(s) IV Push once  dextrose 50% Injectable 25 Gram(s) IV Push once  famotidine    Tablet 20 milliGRAM(s) Oral daily  glucagon  Injectable 1 milliGRAM(s) IntraMuscular once  insulin lispro (ADMELOG) corrective regimen sliding scale   SubCutaneous three times a day before meals  lisinopril 2.5 milliGRAM(s) Oral daily  multivitamin 1 Tablet(s) Oral daily  torsemide 10 milliGRAM(s) Oral daily      TELEMETRY: 	    ECG:  	  RADIOLOGY:  OTHER: 	  	  LABS:	 	    CARDIAC MARKERS:                                8.2    3.55  )-----------( 54       ( 27 Dec 2020 05:19 )             24.2     12-27    123<L>  |  92<L>  |  47<H>  ----------------------------<  158<H>  4.9   |  19<L>  |  1.42<H>    Ca    8.8      27 Dec 2020 05:19      proBNP: Serum Pro-Brain Natriuretic Peptide: 36307 pg/mL (12-20 @ 10:52)    Lipid Profile: Cholesterol 124  LDL --  HDL 39      HgA1c:   TSH: Thyroid Stimulating Hormone, Serum: 4.73 uIU/mL (12-21 @ 07:23)          Assessment and plan  ---------------------------  pt poor historian ( and [place], despite interpretor use) , family not available, info per ED chart   Pt is a 89 y/o woman, reportedly A&OX3, with pmh of ckd, htn, chf, hld, htn, dm, pw bradycardia and lethargy.   reports pt has been complaining of weakness, malaise, mild cp and sob. pt has been receiving diuretics and nitro from family. ems found pt to have bradycardia in 40s, junctional, received .5 atropine w/ improvement to 70s. denies f/c.   per family pt did not have formal MOLST however pts HCP stated to ER pt is dnr/dni ( no order placed: needs to be confirmed...)  pt with sig medical and cardiac hx with bradycardia and hyponatremia.  unknown pt meds at home.  renal eval for hypo ?dehydration  echo  no further bradycardia on tele  avoid av blocking agents  tsh noted  keep hgb>7  hold coreg for now  fu renal  function  echo noted with severe lv dysfunction and severe MR., hyponatremia sec to sig chf and lv dysfunction  ?starting pos inotrope  prognosis is very poor / palliative consult appreciated  dc tele  will add cardiac meds as tolerated, change coreg to metoprolol er 25 mg daily      	        
[facility] DIVISION OF KIDNEY DISEASES AND HYPERTENSION -- FOLLOW UP NOTE  --------------------------------------------------------------------------------  Júnior Bryant   Nephrology Fellow  Pager [facility]: [phone]/ [facility]: [phone]  (After 5 pm or on weekends please page the on-call fellow)      Patient is a 89y old  Female who presents with a chief complaint of lethargy, bradycardia, electrolyte abnormalities (22 Dec 2020 08:04)      24 hour events/subjective: Patient seen and examined at the bedside. Vital signs, labs, medications reviewed. No acute overnight events. Na overnight 120, awaiting AM BMP. Pt currently eating breakfast.        PAST HISTORY  --------------------------------------------------------------------------------  No significant changes to PMH, PSH, FHx, SHx, unless otherwise noted    ALLERGIES & MEDICATIONS  --------------------------------------------------------------------------------  Allergies    No Known Allergies    Intolerances      Standing Inpatient Medications  dextrose 40% Gel 15 Gram(s) Oral once  dextrose 5%. 1000 milliLiter(s) IV Continuous <Continuous>  dextrose 5%. 1000 milliLiter(s) IV Continuous <Continuous>  dextrose 5%. 1000 milliLiter(s) IV Continuous <Continuous>  dextrose 50% Injectable 25 Gram(s) IV Push once  dextrose 50% Injectable 12.5 Gram(s) IV Push once  dextrose 50% Injectable 25 Gram(s) IV Push once  famotidine    Tablet 20 milliGRAM(s) Oral daily  glucagon  Injectable 1 milliGRAM(s) IntraMuscular once  insulin lispro (ADMELOG) corrective regimen sliding scale   SubCutaneous three times a day before meals  multivitamin 1 Tablet(s) Oral daily    PRN Inpatient Medications      REVIEW OF SYSTEMS  --------------------------------------------------------------------------------  Unable to assess    >>> <<<    VITALS/PHYSICAL EXAM  --------------------------------------------------------------------------------  T(C): 36.7 (12-22-20 @ 04:36), Max: 36.7 (12-22-20 @ 04:36)  HR: 77 (12-22-20 @ 04:36) (74 - 87)  BP: 100/46 (12-22-20 @ 04:36) (100/46 - 106/53)  RR: 18 (12-22-20 @ 07:26) (18 - 18)  SpO2: 98% (12-22-20 @ 07:26) (97% - 99%)  Wt(kg): --  Height (cm): 157.5 (12-20-20 @ 10:29)  Weight (kg): 49.9 (12-20-20 @ 10:29)  BMI (kg/m2): 20.1 (12-20-20 @ 10:29)  BSA (m2): 1.48 (12-20-20 @ 10:29)      12-21-20 @ 07:01  -  12-22-20 @ 07:00  --------------------------------------------------------  IN: 360 mL / OUT: 1400 mL / NET: -1040 mL      Physical Exam:    	Gen: NAD  	HEENT: Anicteric  	Pulm: CTA B/L  	CV: S1S2  	Abd: Soft, +BS               : + [facility] with clear urine  	MSK: No LE edema B/L  	Neuro: Awake  	Skin: Warm and dry  	Vascular access:      LABS/STUDIES  --------------------------------------------------------------------------------              6.9    3.11  >-----------<  47       [12-22-20 @ 02:54]              19.4     120  |  85  |  56  ----------------------------<  98      [12-22-20 @ 02:54]  4.3   |  19  |  1.85        Ca     9.4     [12-22-20 @ 02:54]      Mg     2.1     [12-21-20 @ 05:36]      Phos  4.6     [12-21-20 @ 05:36]    TPro  5.4  /  Alb  3.6  /  TBili  0.6  /  DBili  x   /  AST  208  /  ALT  332  /  AlkPhos  45  [12-22-20 @ 02:54]          Creatinine Trend:  SCr 1.85 [12-22 @ 02:54]  SCr 1.90 [12-21 @ 21:02]  SCr 2.20 [12-21 @ 12:55]  SCr 2.51 [12-21 @ 05:36]  SCr 2.69 [12-20 @ 22:18]    Urinalysis - [12-20-20 @ 11:50]      Color Yellow / Appearance Slightly Turbid / SG 1.012 / pH 6.0      Gluc Negative / Ketone Negative  / Bili Negative / Urobili Negative       Blood Negative / Protein 30 mg/dL / Leuk Est Negative / Nitrite Negative      RBC 3 / WBC 0 / Hyaline 10 / Gran  / Sq Epi  / Non Sq Epi 3 / Bacteria Negative    Urine Sodium <35      [12-21-20 @ 11:36]  Urine Osmolality 274      [12-20-20 @ 20:06]    Iron 188, TIBC 260, %sat 72      [12-21-20 @ 08:21]  Ferritin 5083      [12-21-20 @ 07:23]  TSH 4.73      [12-21-20 @ 07:23]  Lipid: chol 124, , HDL 39, LDL --      [12-21-20 @ 08:21]      
           CARDIOLOGY     PROGRESS  NOTE   ________________________________________________    CHIEF COMPLAINT:Patient is a 89y old  Female who presents with a chief complaint of lethargy, bradycardia, electrolyte abnormalities (24 Dec 2020 17:13)  no complain.  	  REVIEW OF SYSTEMS:  CONSTITUTIONAL: No fever, weight loss, or fatigue  EYES: No eye pain, visual disturbances, or discharge  ENT:  No difficulty hearing, tinnitus, vertigo; No sinus or throat pain  NECK: No pain or stiffness  RESPIRATORY: No cough, wheezing, chills or hemoptysis; No Shortness of Breath  CARDIOVASCULAR: No chest pain, palpitations, passing out, dizziness, or leg swelling  GASTROINTESTINAL: No abdominal or epigastric pain. No nausea, vomiting, or hematemesis; No diarrhea or constipation. No melena or hematochezia.  GENITOURINARY: No dysuria, frequency, hematuria, or incontinence  NEUROLOGICAL: No headaches, memory loss, loss of strength, numbness, or tremors  SKIN: No itching, burning, rashes, or lesions   LYMPH Nodes: No enlarged glands  ENDOCRINE: No heat or cold intolerance; No hair loss  MUSCULOSKELETAL: No joint pain or swelling; No muscle, back, or extremity pain  PSYCHIATRIC: No depression, anxiety, mood swings, or difficulty sleeping  HEME/LYMPH: No easy bruising, or bleeding gums  ALLERGY AND IMMUNOLOGIC: No hives or eczema	    [ ] All others negative	  [x ] Unable to obtain    PHYSICAL EXAM:  T(C): 36.6 (12-25-20 @ 05:21), Max: 36.6 (12-25-20 @ 05:21)  HR: 88 (12-25-20 @ 05:21) (85 - 88)  BP: 113/61 (12-25-20 @ 05:21) (110/63 - 122/73)  RR: 18 (12-25-20 @ 05:21) (16 - 18)  SpO2: 100% (12-25-20 @ 05:21) (99% - 100%)  Wt(kg): --  I&O's Summary    24 Dec 2020 07:01  -  25 Dec 2020 07:00  --------------------------------------------------------  IN: 480 mL / OUT: 0 mL / NET: 480 mL        Appearance: Normal	  HEENT:   Normal oral mucosa, PERRL, EOMI	  Lymphatic: No lymphadenopathy  Cardiovascular: Normal S1 S2, No JVD, +_ murmurs, No edema  Respiratory: Lungs clear to auscultation	  Psychiatry: A & O x 3, Mood & affect appropriate  Gastrointestinal:  Soft, Non-tender, + BS	  Skin: No rashes, No ecchymoses, No cyanosis	  Neurologic: Non-focal  Extremities: Normal range of motion, No clubbing, cyanosis or edema  Vascular: Peripheral pulses palpable 2+ bilaterally    MEDICATIONS  (STANDING):  dextrose 40% Gel 15 Gram(s) Oral once  dextrose 5%. 1000 milliLiter(s) (50 mL/Hr) IV Continuous <Continuous>  dextrose 5%. 1000 milliLiter(s) (100 mL/Hr) IV Continuous <Continuous>  dextrose 50% Injectable 25 Gram(s) IV Push once  dextrose 50% Injectable 12.5 Gram(s) IV Push once  dextrose 50% Injectable 25 Gram(s) IV Push once  famotidine    Tablet 20 milliGRAM(s) Oral daily  glucagon  Injectable 1 milliGRAM(s) IntraMuscular once  insulin lispro (ADMELOG) corrective regimen sliding scale   SubCutaneous three times a day before meals  multivitamin 1 Tablet(s) Oral daily      TELEMETRY: 	    ECG:  	  RADIOLOGY:  OTHER: 	  	  LABS:	 	    CARDIAC MARKERS:                                8.4    3.52  )-----------( 56       ( 25 Dec 2020 05:52 )             24.0     12-25    125<L>  |  95<L>  |  41<H>  ----------------------------<  121<H>  4.3   |  18<L>  |  1.38<H>    Ca    9.3      25 Dec 2020 05:52  Mg     2.0     12-24      proBNP: Serum Pro-Brain Natriuretic Peptide: 00644 pg/mL (12-20 @ 10:52)    Lipid Profile: Cholesterol 124  LDL --  HDL 39      HgA1c:   TSH: Thyroid Stimulating Hormone, Serum: 4.73 uIU/mL (12-21 @ 07:23)          Assessment and plan  ---------------------------  pt poor historian ( and [place], despite interpretor use) , family not available, info per ED chart   Pt is a 89 y/o woman, reportedly A&OX3, with pmh of ckd, htn, chf, hld, htn, dm, pw bradycardia and lethargy.   reports pt has been complaining of weakness, malaise, mild cp and sob. pt has been receiving diuretics and nitro from family. ems found pt to have bradycardia in 40s, junctional, received .5 atropine w/ improvement to 70s. denies f/c.   per family pt did not have formal MOLST however pts HCP stated to ER pt is dnr/dni ( no order placed: needs to be confirmed...)  pt with sig medical and cardiac hx with bradycardia and hyponatremia.  unknown pt meds at home.  renal eval for hypo ?dehydration  echo  no further bradycardia on tele  avoid av blocking agents  tsh noted  keep hgb>7  hold coreg for now  fu renal  function  echo noted with severe lv dysfunction and severe MR., hyponatremia sec to sig chf and lv dysfunction  ?starting pos inotrope  prognosis is very poor / palliative consult appreciated  dc tele  will add cardiac meds as tolerated    	        
           CARDIOLOGY     PROGRESS  NOTE   ________________________________________________    CHIEF COMPLAINT:Patient is a 89y old  Female who presents with a chief complaint of lethargy, bradycardia, electrolyte abnormalities (25 Dec 2020 13:07)  no complain.  	  REVIEW OF SYSTEMS:  CONSTITUTIONAL: No fever, weight loss, or fatigue  EYES: No eye pain, visual disturbances, or discharge  ENT:  No difficulty hearing, tinnitus, vertigo; No sinus or throat pain  NECK: No pain or stiffness  RESPIRATORY: No cough, wheezing, chills or hemoptysis; No Shortness of Breath  CARDIOVASCULAR: No chest pain, palpitations, passing out, dizziness, or leg swelling  GASTROINTESTINAL: No abdominal or epigastric pain. No nausea, vomiting, or hematemesis; No diarrhea or constipation. No melena or hematochezia.  GENITOURINARY: No dysuria, frequency, hematuria, or incontinence  NEUROLOGICAL: No headaches, memory loss, loss of strength, numbness, or tremors  SKIN: No itching, burning, rashes, or lesions   LYMPH Nodes: No enlarged glands  ENDOCRINE: No heat or cold intolerance; No hair loss  MUSCULOSKELETAL: No joint pain or swelling; No muscle, back, or extremity pain  PSYCHIATRIC: No depression, anxiety, mood swings, or difficulty sleeping  HEME/LYMPH: No easy bruising, or bleeding gums  ALLERGY AND IMMUNOLOGIC: No hives or eczema	    [ ] All others negative	  [ x] Unable to obtain    PHYSICAL EXAM:  T(C): 36.4 (12-26-20 @ 04:34), Max: 36.4 (12-25-20 @ 20:16)  HR: 95 (12-26-20 @ 04:34) (95 - 101)  BP: 113/60 (12-26-20 @ 04:34) (113/60 - 118/67)  RR: 18 (12-26-20 @ 04:34) (18 - 18)  SpO2: 98% (12-26-20 @ 04:34) (98% - 98%)  Wt(kg): --  I&O's Summary    25 Dec 2020 07:01  -  26 Dec 2020 07:00  --------------------------------------------------------  IN: 180 mL / OUT: 0 mL / NET: 180 mL        Appearance: Normal	  HEENT:   Normal oral mucosa, PERRL, EOMI	  Lymphatic: No lymphadenopathy  Cardiovascular: Normal S1 S2, No JVD, + murmurs, No edema  Respiratory: Lungs clear to auscultation	  Psychiatry: A & O x 3, Mood & affect appropriate  Gastrointestinal:  Soft, Non-tender, + BS	  Skin: No rashes, No ecchymoses, No cyanosis	  Neurologic: Non-focal  Extremities: Normal range of motion, No clubbing, cyanosis or edema  Vascular: Peripheral pulses palpable 2+ bilaterally    MEDICATIONS  (STANDING):  dextrose 40% Gel 15 Gram(s) Oral once  dextrose 5%. 1000 milliLiter(s) (50 mL/Hr) IV Continuous <Continuous>  dextrose 5%. 1000 milliLiter(s) (100 mL/Hr) IV Continuous <Continuous>  dextrose 50% Injectable 25 Gram(s) IV Push once  dextrose 50% Injectable 12.5 Gram(s) IV Push once  dextrose 50% Injectable 25 Gram(s) IV Push once  famotidine    Tablet 20 milliGRAM(s) Oral daily  glucagon  Injectable 1 milliGRAM(s) IntraMuscular once  insulin lispro (ADMELOG) corrective regimen sliding scale   SubCutaneous three times a day before meals  multivitamin 1 Tablet(s) Oral daily      TELEMETRY: 	    ECG:  	  RADIOLOGY:  OTHER: 	  	  LABS:	 	    CARDIAC MARKERS:                                8.5    3.38  )-----------( 61       ( 26 Dec 2020 05:50 )             25.4     12-26    126<L>  |  94<L>  |  42<H>  ----------------------------<  147<H>  4.7   |  19<L>  |  1.32<H>    Ca    9.3      26 Dec 2020 05:50      proBNP: Serum Pro-Brain Natriuretic Peptide: 97981 pg/mL (12-20 @ 10:52)    Lipid Profile: Cholesterol 124  LDL --  HDL 39      HgA1c:   TSH: Thyroid Stimulating Hormone, Serum: 4.73 uIU/mL (12-21 @ 07:23)          Assessment and plan  ---------------------------  pt poor historian ( and [place], despite interpretor use) , family not available, info per ED chart   Pt is a 89 y/o woman, reportedly A&OX3, with pmh of ckd, htn, chf, hld, htn, dm, pw bradycardia and lethargy.   reports pt has been complaining of weakness, malaise, mild cp and sob. pt has been receiving diuretics and nitro from family. ems found pt to have bradycardia in 40s, junctional, received .5 atropine w/ improvement to 70s. denies f/c.   per family pt did not have formal MOLST however pts HCP stated to ER pt is dnr/dni ( no order placed: needs to be confirmed...)  pt with sig medical and cardiac hx with bradycardia and hyponatremia.  unknown pt meds at home.  renal eval for hypo ?dehydration  echo  no further bradycardia on tele  avoid av blocking agents  tsh noted  keep hgb>7  hold coreg for now  fu renal  function  echo noted with severe lv dysfunction and severe MR., hyponatremia sec to sig chf and lv dysfunction  ?starting pos inotrope  prognosis is very poor / palliative consult appreciated  dc tele  will add cardiac meds as tolerated      	        
           CARDIOLOGY     PROGRESS  NOTE   ________________________________________________    CHIEF COMPLAINT:Patient is a 89y old  Female who presents with a chief complaint of lethargy, bradycardia, electrolyte abnormalities (27 Dec 2020 16:25)  no complain.  	  REVIEW OF SYSTEMS:  CONSTITUTIONAL: No fever, weight loss, or fatigue  EYES: No eye pain, visual disturbances, or discharge  ENT:  No difficulty hearing, tinnitus, vertigo; No sinus or throat pain  NECK: No pain or stiffness  RESPIRATORY: No cough, wheezing, chills or hemoptysis; No Shortness of Breath  CARDIOVASCULAR: No chest pain, palpitations, passing out, dizziness, or leg swelling  GASTROINTESTINAL: No abdominal or epigastric pain. No nausea, vomiting, or hematemesis; No diarrhea or constipation. No melena or hematochezia.  GENITOURINARY: No dysuria, frequency, hematuria, or incontinence  NEUROLOGICAL: No headaches, memory loss, loss of strength, numbness, or tremors  SKIN: No itching, burning, rashes, or lesions   LYMPH Nodes: No enlarged glands  ENDOCRINE: No heat or cold intolerance; No hair loss  MUSCULOSKELETAL: No joint pain or swelling; No muscle, back, or extremity pain  PSYCHIATRIC: No depression, anxiety, mood swings, or difficulty sleeping  HEME/LYMPH: No easy bruising, or bleeding gums  ALLERGY AND IMMUNOLOGIC: No hives or eczema	    [ ] All others negative	  [x ] Unable to obtain    PHYSICAL EXAM:  T(C): 36.4 (12-28-20 @ 04:45), Max: 36.6 (12-27-20 @ 11:45)  HR: 81 (12-28-20 @ 04:45) (77 - 81)  BP: 111/69 (12-28-20 @ 04:45) (100/61 - 111/69)  RR: 18 (12-28-20 @ 04:45) (18 - 18)  SpO2: 100% (12-28-20 @ 04:45) (100% - 100%)  Wt(kg): --  I&O's Summary    27 Dec 2020 07:01  -  28 Dec 2020 07:00  --------------------------------------------------------  IN: 670 mL / OUT: 0 mL / NET: 670 mL        Appearance: Normal	  HEENT:   Normal oral mucosa, PERRL, EOMI	  Lymphatic: No lymphadenopathy  Cardiovascular: Normal S1 S2, No JVD, +murmurs, No edema  Respiratory: rhonchi  Psychiatry: A & O x 3, Mood & affect appropriate  Gastrointestinal:  Soft, Non-tender, + BS	  Skin: No rashes, No ecchymoses, No cyanosis	  Neurologic: Non-focal  Extremities: Normal range of motion, No clubbing, cyanosis or edema  Vascular: Peripheral pulses palpable 2+ bilaterally    MEDICATIONS  (STANDING):  dextrose 40% Gel 15 Gram(s) Oral once  dextrose 5%. 1000 milliLiter(s) (50 mL/Hr) IV Continuous <Continuous>  dextrose 5%. 1000 milliLiter(s) (100 mL/Hr) IV Continuous <Continuous>  dextrose 50% Injectable 25 Gram(s) IV Push once  dextrose 50% Injectable 12.5 Gram(s) IV Push once  dextrose 50% Injectable 25 Gram(s) IV Push once  famotidine    Tablet 20 milliGRAM(s) Oral daily  glucagon  Injectable 1 milliGRAM(s) IntraMuscular once  insulin lispro (ADMELOG) corrective regimen sliding scale   SubCutaneous three times a day before meals  lisinopril 2.5 milliGRAM(s) Oral daily  metoprolol succinate ER 25 milliGRAM(s) Oral daily  multivitamin 1 Tablet(s) Oral daily  torsemide 10 milliGRAM(s) Oral daily      TELEMETRY: 	    ECG:  	  RADIOLOGY:  OTHER: 	  	  LABS:	 	    CARDIAC MARKERS:                                8.2    3.55  )-----------( 54       ( 27 Dec 2020 05:19 )             24.2     12-27    123<L>  |  92<L>  |  47<H>  ----------------------------<  158<H>  4.9   |  19<L>  |  1.42<H>    Ca    8.8      27 Dec 2020 05:19      proBNP: Serum Pro-Brain Natriuretic Peptide: 67260 pg/mL (12-20 @ 10:52)    Lipid Profile: Cholesterol 124  LDL --  HDL 39      HgA1c:   TSH: Thyroid Stimulating Hormone, Serum: 4.73 uIU/mL (12-21 @ 07:23)          Assessment and plan  ---------------------------  pt poor historian ( and [place], despite interpretor use) , family not available, info per ED chart   Pt is a 89 y/o woman, reportedly A&OX3, with pmh of ckd, htn, chf, hld, htn, dm, pw bradycardia and lethargy.   reports pt has been complaining of weakness, malaise, mild cp and sob. pt has been receiving diuretics and nitro from family. ems found pt to have bradycardia in 40s, junctional, received .5 atropine w/ improvement to 70s. denies f/c.   per family pt did not have formal MOLST however pts HCP stated to ER pt is dnr/dni ( no order placed: needs to be confirmed...)  pt with sig medical and cardiac hx with bradycardia and hyponatremia.  unknown pt meds at home.  renal eval for hypo ?dehydration  echo  no further bradycardia on tele  avoid av blocking agents  tsh noted  keep hgb>7  hold coreg for now  fu renal  function  echo noted with severe lv dysfunction and severe MR., hyponatremia sec to sig chf and lv dysfunction  ?starting pos inotrope  prognosis is very poor / palliative consult appreciated  dc tele  will add cardiac meds as tolerated, change coreg to metoprolol er 25 mg daily  pt with severe hyponatremia, sec to her sig chf and cardiomyopathy, discussed with pt daughter    	        
[facility] DIVISION OF KIDNEY DISEASES AND HYPERTENSION -- FOLLOW UP NOTE  --------------------------------------------------------------------------------  Júnior Bryant   Nephrology Fellow  Pager NS: [phone]/ [facility]: [phone]  (After 5 pm or on weekends please page the on-call fellow)      Patient is a 89y old  Female who presents with a chief complaint of lethargy, bradycardia, electrolyte abnormalities (23 Dec 2020 08:03)      24 hour events/subjective: Patient seen and examined at the bedside. Vital signs, labs, medications reviewed.        PAST HISTORY  --------------------------------------------------------------------------------  No significant changes to PMH, PSH, FHx, SHx, unless otherwise noted    ALLERGIES & MEDICATIONS  --------------------------------------------------------------------------------  Allergies    No Known Allergies    Intolerances      Standing Inpatient Medications  dextrose 40% Gel 15 Gram(s) Oral once  dextrose 5%. 1000 milliLiter(s) IV Continuous <Continuous>  dextrose 5%. 1000 milliLiter(s) IV Continuous <Continuous>  dextrose 50% Injectable 25 Gram(s) IV Push once  dextrose 50% Injectable 12.5 Gram(s) IV Push once  dextrose 50% Injectable 25 Gram(s) IV Push once  famotidine    Tablet 20 milliGRAM(s) Oral daily  glucagon  Injectable 1 milliGRAM(s) IntraMuscular once  insulin lispro (ADMELOG) corrective regimen sliding scale   SubCutaneous three times a day before meals  multivitamin 1 Tablet(s) Oral daily    PRN Inpatient Medications      REVIEW OF SYSTEMS  --------------------------------------------------------------------------------  unable to assess    >>> <<<    VITALS/PHYSICAL EXAM  --------------------------------------------------------------------------------  T(C): 36.6 (12-23-20 @ 04:18), Max: 36.9 (12-22-20 @ 12:09)  HR: 82 (12-23-20 @ 04:18) (76 - 84)  BP: 107/58 (12-23-20 @ 04:18) (92/52 - 114/66)  RR: 18 (12-23-20 @ 04:18) (18 - 18)  SpO2: 99% (12-23-20 @ 04:18) (98% - 100%)  Wt(kg): --        12-22-20 @ 07:01  -  12-23-20 @ 07:00  --------------------------------------------------------  IN: 520 mL / OUT: 1125 mL / NET: -605 mL      Physical Exam:    	Gen: NAD  	HEENT: Anicteric  	Pulm: CTA B/L  	CV: S1S2  	Abd: Soft, +BS   	MSK: No LE edema B/L  	Neuro: Awake  	Skin: Warm and dry  	Vascular access:      LABS/STUDIES  --------------------------------------------------------------------------------              8.0    3.67  >-----------<  48       [12-23-20 @ 05:40]              23.0     120  |  89  |  46  ----------------------------<  119      [12-23-20 @ 05:40]  4.4   |  18  |  1.53        Ca     9.2     [12-23-20 @ 05:40]      Mg     1.8     [12-23-20 @ 05:40]    TPro  5.3  /  Alb  3.5  /  TBili  0.5  /  DBili  x   /  AST  186  /  ALT  317  /  AlkPhos  47  [12-22-20 @ 08:51]          Creatinine Trend:  SCr 1.53 [12-23 @ 05:40]  SCr 1.57 [12-22 @ 23:40]  SCr 1.58 [12-22 @ 17:44]  SCr 1.79 [12-22 @ 08:51]  SCr 1.85 [12-22 @ 02:54]    Urinalysis - [12-20-20 @ 11:50]      Color Yellow / Appearance Slightly Turbid / SG 1.012 / pH 6.0      Gluc Negative / Ketone Negative  / Bili Negative / Urobili Negative       Blood Negative / Protein 30 mg/dL / Leuk Est Negative / Nitrite Negative      RBC 3 / WBC 0 / Hyaline 10 / Gran  / Sq Epi  / Non Sq Epi 3 / Bacteria Negative    Urine Sodium <35      [12-21-20 @ 11:36]  Urine Osmolality 274      [12-20-20 @ 20:06]    Iron 188, TIBC 260, %sat 72      [12-21-20 @ 08:21]  Ferritin 5083      [12-21-20 @ 07:23]  TSH 4.73      [12-21-20 @ 07:23]  Lipid: chol 124, , HDL 39, LDL --      [12-21-20 @ 08:21]

## 2020-12-28 NOTE — PROGRESS NOTE ADULT - ASSESSMENT
_________________________________________________________________________________________  ========>>  M E D I C A L   A T T E N D I N G    F O L L O W  U P  N O T E  <<=========  -----------------------------------------------------------------------------------------------------    - Patient seen and examined by me earlier today.   - In summary,  JEANNA LUNA is a 89y year old woman admitted with lethargy  - Patient today overall doing ok, comfortable, eating ok       Dtr declined hospice : H[facility] being set up to go home TODAY    reportedly  there was concern that pt was having frequency overnight ( on diuretics) and a U/S was sent, as bellow     ==================>> REVIEW OF SYSTEM <<=================    limited ROS   GEN: no pain  RESP: no SOB, no cough  CVS: no chest pain  GI: no abdominal pain  : no dysuria  Neuro: no headache    ==================>> PHYSICAL EXAM <<=================    GEN: Alert and responsive, , NAD , comfortable , ambulating and getting dressed !!   HEENT: NCAT, PERRL, MMM, hearing intact  Neck: supple , no JVD appreciated  CVS: S1S2 , regular , No M/R/G appreciated   PULM: CTA B/L,  no W/R/R appreciated  ABD.: soft. non tender, non distended  Extrem: intact pulses , no edema   PSYCH : normal mood,  not anxious      ==================>> MEDICATIONS <<====================    cephalexin 500 milliGRAM(s) Oral every 12 hours  dextrose 40% Gel 15 Gram(s) Oral once  dextrose 5%. 1000 milliLiter(s) IV Continuous <Continuous>  dextrose 5%. 1000 milliLiter(s) IV Continuous <Continuous>  dextrose 50% Injectable 25 Gram(s) IV Push once  dextrose 50% Injectable 12.5 Gram(s) IV Push once  dextrose 50% Injectable 25 Gram(s) IV Push once  famotidine    Tablet 20 milliGRAM(s) Oral daily  glucagon  Injectable 1 milliGRAM(s) IntraMuscular once  insulin lispro (ADMELOG) corrective regimen sliding scale   SubCutaneous three times a day before meals  lisinopril 2.5 milliGRAM(s) Oral daily  metoprolol succinate ER 25 milliGRAM(s) Oral daily  multivitamin 1 Tablet(s) Oral daily  torsemide 10 milliGRAM(s) Oral daily    MEDICATIONS  (PRN):  acetaminophen   Tablet .. 650 milliGRAM(s) Oral every 6 hours PRN Mild Pain (1 - 3), Moderate Pain (4 - 6)    ___________  Active diet:  Diet, Regular:   Consistent Carbohydrate Evening Snack (CSTCHOSN)  1000mL Fluid Restriction ([id number])  Supplement Feeding Modality:  Oral  Nepro Cans or Servings Per Day:  3       Frequency:  Three Times a day  ___________________    ==================>> VITAL SIGNS <<==================    Vital Signs Last 24 HrsT(C): 36.8 (20 @ 13:06)  T(F): 98.2 (20 @ 13:06), Max: 98.2 (20 @ 13:06)  HR: 77 (20 @ 13:06) (77 - 81)  BP: 101/57 (20 @ 13:06)  RR: 18 (20 @ 13:06) (18 - 18)  SpO2: 100% (20 @ 13:06) (100% - 100%)      POCT Blood Glucose.: 197 mg/dL (28 Dec 2020 11:44)  POCT Blood Glucose.: 180 mg/dL (28 Dec 2020 07:28)  POCT Blood Glucose.: 158 mg/dL (27 Dec 2020 21:47)  POCT Blood Glucose.: 185 mg/dL (27 Dec 2020 16:54)     ==================>> LAB AND IMAGING <<==================                        8.2    3.55  )-----------( 54       ( 27 Dec 2020 05:19 )             24.2        12    123<L>  |  92<L>  |  47<H>  ----------------------------<  158<H>  4.9   |  19<L>  |  1.42<H>    Ca    8.8      27 Dec 2020 05:19      WBC count:   3.55 <<== ,  3.38 <<== ,  3.52 <<== ,  4.98 <<==   Hemoglobin:   8.2 <<==,  8.5 <<==,  8.4 <<==,  9.4 <<==  platelets:  54 <==, 61 <==, 56 <==, 61 <==, 48 <==, 48 <==    Creatinine:  1.42  <<==, 1.32  <<==, 1.38  <<==, 1.38  <<==, 1.53  <<==, 1.57  <<==  Sodium:   123  <==, 126  <==, 125  <==, 124  <==, 120  <==, 119  <==, 115  <==    Urinalysis Basic - ( 28 Dec 2020 06:53 )  Color: Light Orange / Appearance: Turbid / S.008 / pH: x  Gluc: x / Ketone: Negative  / Bili: Negative / Urobili: Negative   Blood: x / Protein: 30 mg/dL / Nitrite: Positive   Leuk Esterase: Large / RBC: 24 /hpf / WBC 1601 /HPF   Sq Epi: x / Non Sq Epi: 1 / Bacteria: Many          < from: US Abdomen Complete (US Abdomen Complete .) (20 @ 13:50) >  IMPRESSION:  Coarsened echotexture of the liver, may reflect underlying parenchymal disease.  Cholelithiasis.  Trace ascites. Bilateral pleural effusions.  No hydronephrosis.  < end of copied text >    < from: TTE with Doppler (w/Cont) (20 @ 05:10) >  Conclusions:  Endocardial visualization enhanced with intravenous  injection of Ultrasonic Enhancing Agent (Definity).  Severely dilated left ventricle.  Severely reduced left ventricular systolic function. Large  area of apical akinesis.  Severe mitral regurgitation.  Severe pulmonary hypertension.  ------------------------------------------------------------------------  Confirmed on  2020 - 10:50:30 by Jorge Salas MD,  FARIDEH  < end of copied text >    ___________________________________________________________________________________  ===============>>  A S S E S S M E N T   A N D   P L A N <<===============  ------------------------------------------------------------------------------------------    Pt is a 89 y/o woman, reportedly A&OX3, with pmh of ckd, htn, chf, hld, htn, dm, pw bradycardia and lethargy.   reports pt has been complaining of weakness, malaise, mild cp and sob. pt has been receiving diuretics and nitro from family. ems found pt to have bradycardia in 40s, junctional, received .5 atropine w/ improvement to 70s.     Problem/Plan - 1:  ·  Problem:  BECKY on CKD ( unknown baseline)     overall improved : likely at baseline     judicial use of diuretics for CHF      meds adjusted per cardio   monitor BMP     ** pt with bacteriuria     no fever, no leukocytosis     very abnormal U.A     will DC pt on empiric abx     check Urine cultures > will follow     Problem/Plan - 2:  ·  Problem: Hyperkalemia  improved post treatment       meds adjusted per cardio   encourage PO hydration  monitor BMP      Problem/Plan - 3:  ·  Problem: Hyponatremia.    chronic:    partly related to severe CHF   encourage PO intake   monitor BMP     Problem/Plan - 4:  ·  Problem: Bradycardia.     improved as potassium improved  monitor HR on tele      meds adjusted per cardio   cardio f/u appreciated     Problem/Plan - 5:  ·  Problem: Type 2 diabetes mellitus with other specified complication, without long-term current use of insulin.  Plan: hold oral meds  monitor FS  RISS    Problem/Plan - 6:  Problem: pt with chronic systolic HF     Echo as above showing severe chronic systolic CHF with severe MR and severe pulmonary HTN   resumed for DC planing as above   cardio follow up and mgmt   overall poor prognosis    Problem/Plan - 7:  Problem: anemia of chronic disease     per pt's Dtr pt receives weekly procrit 40K>> ordered one dose   no sings of bleeding at  this time   monitor     Problem/Plan - 8:  Problem: Transaminitis stable   suspect due to hypoperfusion in setting of bradycardia and CHF / congestive hepatopathy  abdominal sono noted   monitor    -GI/DVT Prophylaxis per protocol.    venodynes          Dtr declined hospice : HHA being set up to go home   ___________________________  H. LETITIA Diaz.  Pager: [phone]

## 2021-01-01 NOTE — CHART NOTE - NSCHARTNOTESELECT_GEN_ALL_CORE
Event Note
Palliative Care/Event Note
critical labs/Event Note
postDC-Culture-

## 2021-01-01 NOTE — CHART NOTE - NSCHARTNOTEFT_GEN_A_CORE
.      /\\\///^\\\///\    Post discharge addendum / clarification    following urine cultures as bellow    pt with pansensitive organism  pt was discharged on Keflex  no changes..     _______________________  C U L T U R E S :    Culture - Urine (collected 28 Dec 2020 18:16)  Source: .Urine Clean Catch (Midstream)  Final Report (30 Dec 2020 14:27):    >100,000 CFU/ml Escherichia coli  Organism: Escherichia coli (30 Dec 2020 14:27)  Organism: Escherichia coli (30 Dec 2020 14:27)    Sensitivities:      -  Amikacin: S <=16      -  Amoxicillin/Clavulanic Acid: S <=8/4      -  Ampicillin: S <=8 These ampicillin results predict results for amoxicillin      -  Ampicillin/Sulbactam: S <=4/2 Enterobacter, Citrobacter, and Serratia may develop resistance during prolonged therapy (3-4 days)      -  Aztreonam: S <=4      -  Cefazolin: S <=2 (MIC_CL_COM_ENTERIC_CEFAZU) For uncomplicated UTI with K. pneumoniae, E. coli, or P. mirablis: NATALIIA <=16 is sensitive and NATALIIA >=32 is resistant. This also predicts results for oral agents cefaclor, cefdinir, cefpodoxime, cefprozil, cefuroxime axetil, cephalexin and locarbef for uncomplicated UTI. Note that some isolates may be susceptible to these agents while testing resistant to cefazolin.      -  Cefepime: S <=2      -  Cefoxitin: S <=8      -  Ceftriaxone: S <=1 Enterobacter, Citrobacter, and Serratia may develop resistance during prolonged therapy      -  Ciprofloxacin: S <=0.25      -  Ertapenem: S <=0.5      -  Gentamicin: S <=2      -  Imipenem: S <=1      -  Levofloxacin: S <=0.5      -  Meropenem: S <=1      -  Nitrofurantoin: S <=32 Should not be used to treat pyelonephritis      -  Piperacillin/Tazobactam: S <=8      -  Tigecycline: S <=2      -  Tobramycin: S <=2      -  Trimethoprim/Sulfamethoxazole: S <=0.5/9.5      Method Type: NATALIIA        COVID-19 PCR: NotDetec (12-20-20 @ 14:33)

## 2021-01-19 ENCOUNTER — INPATIENT (INPATIENT)
Facility: HOSPITAL | Age: 86
LOS: 5 days | Discharge: NURSING, REHAB OR LONG-TERM CARE | DRG: 291 | End: 2021-01-25
Attending: GENERAL PRACTICE | Admitting: GENERAL PRACTICE
Payer: MEDICARE

## 2021-01-19 VITALS — HEIGHT: 62 IN

## 2021-01-19 DIAGNOSIS — Z71.89 OTHER SPECIFIED COUNSELING: ICD-10-CM

## 2021-01-19 DIAGNOSIS — E11.69 TYPE 2 DIABETES MELLITUS WITH OTHER SPECIFIED COMPLICATION: ICD-10-CM

## 2021-01-19 DIAGNOSIS — I50.9 HEART FAILURE, UNSPECIFIED: ICD-10-CM

## 2021-01-19 DIAGNOSIS — I50.23 ACUTE ON CHRONIC SYSTOLIC (CONGESTIVE) HEART FAILURE: ICD-10-CM

## 2021-01-19 DIAGNOSIS — N18.30 CHRONIC KIDNEY DISEASE, STAGE 3 UNSPECIFIED: ICD-10-CM

## 2021-01-19 DIAGNOSIS — E87.1 HYPO-OSMOLALITY AND HYPONATREMIA: ICD-10-CM

## 2021-01-19 DIAGNOSIS — N18.4 CHRONIC KIDNEY DISEASE, STAGE 4 (SEVERE): ICD-10-CM

## 2021-01-19 DIAGNOSIS — Z29.9 ENCOUNTER FOR PROPHYLACTIC MEASURES, UNSPECIFIED: ICD-10-CM

## 2021-01-19 DIAGNOSIS — E87.5 HYPERKALEMIA: ICD-10-CM

## 2021-01-19 LAB
ALBUMIN SERPL ELPH-MCNC: 4.1 G/DL — SIGNIFICANT CHANGE UP (ref 3.3–5)
ALP SERPL-CCNC: 67 U/L — SIGNIFICANT CHANGE UP (ref 40–120)
ALT FLD-CCNC: 36 U/L — SIGNIFICANT CHANGE UP (ref 10–45)
ANION GAP SERPL CALC-SCNC: 13 MMOL/L — SIGNIFICANT CHANGE UP (ref 5–17)
ANION GAP SERPL CALC-SCNC: 15 MMOL/L — SIGNIFICANT CHANGE UP (ref 5–17)
ANION GAP SERPL CALC-SCNC: 16 MMOL/L — SIGNIFICANT CHANGE UP (ref 5–17)
APTT BLD: 30 SEC — SIGNIFICANT CHANGE UP (ref 27.5–35.5)
AST SERPL-CCNC: 115 U/L — HIGH (ref 10–40)
BASOPHILS # BLD AUTO: 0 K/UL — SIGNIFICANT CHANGE UP (ref 0–0.2)
BASOPHILS NFR BLD AUTO: 0 % — SIGNIFICANT CHANGE UP (ref 0–2)
BILIRUB SERPL-MCNC: 1.3 MG/DL — HIGH (ref 0.2–1.2)
BUN SERPL-MCNC: 58 MG/DL — HIGH (ref 7–23)
BUN SERPL-MCNC: 59 MG/DL — HIGH (ref 7–23)
BUN SERPL-MCNC: 61 MG/DL — HIGH (ref 7–23)
CALCIUM SERPL-MCNC: 9 MG/DL — SIGNIFICANT CHANGE UP (ref 8.4–10.5)
CALCIUM SERPL-MCNC: 9.1 MG/DL — SIGNIFICANT CHANGE UP (ref 8.4–10.5)
CALCIUM SERPL-MCNC: 9.5 MG/DL — SIGNIFICANT CHANGE UP (ref 8.4–10.5)
CHLORIDE SERPL-SCNC: 83 MMOL/L — LOW (ref 96–108)
CHLORIDE SERPL-SCNC: 84 MMOL/L — LOW (ref 96–108)
CHLORIDE SERPL-SCNC: 87 MMOL/L — LOW (ref 96–108)
CO2 SERPL-SCNC: 18 MMOL/L — LOW (ref 22–31)
CO2 SERPL-SCNC: 20 MMOL/L — LOW (ref 22–31)
CO2 SERPL-SCNC: 22 MMOL/L — SIGNIFICANT CHANGE UP (ref 22–31)
CREAT SERPL-MCNC: 1.31 MG/DL — HIGH (ref 0.5–1.3)
CREAT SERPL-MCNC: 1.4 MG/DL — HIGH (ref 0.5–1.3)
CREAT SERPL-MCNC: 1.43 MG/DL — HIGH (ref 0.5–1.3)
EOSINOPHIL # BLD AUTO: 0.14 K/UL — SIGNIFICANT CHANGE UP (ref 0–0.5)
EOSINOPHIL NFR BLD AUTO: 2.6 % — SIGNIFICANT CHANGE UP (ref 0–6)
GAS PNL BLDV: SIGNIFICANT CHANGE UP
GLUCOSE BLDC GLUCOMTR-MCNC: 148 MG/DL — HIGH (ref 70–99)
GLUCOSE BLDC GLUCOMTR-MCNC: 151 MG/DL — HIGH (ref 70–99)
GLUCOSE SERPL-MCNC: 151 MG/DL — HIGH (ref 70–99)
GLUCOSE SERPL-MCNC: 166 MG/DL — HIGH (ref 70–99)
GLUCOSE SERPL-MCNC: 205 MG/DL — HIGH (ref 70–99)
HCT VFR BLD CALC: 34.5 % — SIGNIFICANT CHANGE UP (ref 34.5–45)
HGB BLD-MCNC: 11.4 G/DL — LOW (ref 11.5–15.5)
INR BLD: 1.11 RATIO — SIGNIFICANT CHANGE UP (ref 0.88–1.16)
LYMPHOCYTES # BLD AUTO: 0.55 K/UL — LOW (ref 1–3.3)
LYMPHOCYTES # BLD AUTO: 10.3 % — LOW (ref 13–44)
MCHC RBC-ENTMCNC: 33 GM/DL — SIGNIFICANT CHANGE UP (ref 32–36)
MCHC RBC-ENTMCNC: 34.3 PG — HIGH (ref 27–34)
MCV RBC AUTO: 103.9 FL — HIGH (ref 80–100)
MONOCYTES # BLD AUTO: 0.28 K/UL — SIGNIFICANT CHANGE UP (ref 0–0.9)
MONOCYTES NFR BLD AUTO: 5.2 % — SIGNIFICANT CHANGE UP (ref 2–14)
NEUTROPHILS # BLD AUTO: 4.38 K/UL — SIGNIFICANT CHANGE UP (ref 1.8–7.4)
NEUTROPHILS NFR BLD AUTO: 76.7 % — SIGNIFICANT CHANGE UP (ref 43–77)
PLATELET # BLD AUTO: 66 K/UL — LOW (ref 150–400)
POTASSIUM SERPL-MCNC: 5 MMOL/L — SIGNIFICANT CHANGE UP (ref 3.5–5.3)
POTASSIUM SERPL-MCNC: 5.5 MMOL/L — HIGH (ref 3.5–5.3)
POTASSIUM SERPL-MCNC: 6.8 MMOL/L — CRITICAL HIGH (ref 3.5–5.3)
POTASSIUM SERPL-SCNC: 5 MMOL/L — SIGNIFICANT CHANGE UP (ref 3.5–5.3)
POTASSIUM SERPL-SCNC: 5.5 MMOL/L — HIGH (ref 3.5–5.3)
POTASSIUM SERPL-SCNC: 6.8 MMOL/L — CRITICAL HIGH (ref 3.5–5.3)
PROT SERPL-MCNC: 7 G/DL — SIGNIFICANT CHANGE UP (ref 6–8.3)
PROTHROM AB SERPL-ACNC: 13.3 SEC — SIGNIFICANT CHANGE UP (ref 10.6–13.6)
RBC # BLD: 3.32 M/UL — LOW (ref 3.8–5.2)
RBC # FLD: 17.6 % — HIGH (ref 10.3–14.5)
SARS-COV-2 RNA SPEC QL NAA+PROBE: SIGNIFICANT CHANGE UP
SODIUM SERPL-SCNC: 118 MMOL/L — CRITICAL LOW (ref 135–145)
SODIUM SERPL-SCNC: 120 MMOL/L — CRITICAL LOW (ref 135–145)
SODIUM SERPL-SCNC: 120 MMOL/L — CRITICAL LOW (ref 135–145)
TROPONIN T, HIGH SENSITIVITY RESULT: 34 NG/L — SIGNIFICANT CHANGE UP (ref 0–51)
TROPONIN T, HIGH SENSITIVITY RESULT: 43 NG/L — SIGNIFICANT CHANGE UP (ref 0–51)
WBC # BLD: 5.35 K/UL — SIGNIFICANT CHANGE UP (ref 3.8–10.5)
WBC # FLD AUTO: 5.35 K/UL — SIGNIFICANT CHANGE UP (ref 3.8–10.5)

## 2021-01-19 PROCEDURE — 71045 X-RAY EXAM CHEST 1 VIEW: CPT | Mod: 26

## 2021-01-19 PROCEDURE — 99223 1ST HOSP IP/OBS HIGH 75: CPT

## 2021-01-19 PROCEDURE — 99291 CRITICAL CARE FIRST HOUR: CPT | Mod: GC

## 2021-01-19 PROCEDURE — 93010 ELECTROCARDIOGRAM REPORT: CPT | Mod: GC

## 2021-01-19 PROCEDURE — 99222 1ST HOSP IP/OBS MODERATE 55: CPT

## 2021-01-19 RX ORDER — DEXTROSE 50 % IN WATER 50 %
15 SYRINGE (ML) INTRAVENOUS ONCE
Refills: 0 | Status: DISCONTINUED | OUTPATIENT
Start: 2021-01-19 | End: 2021-01-25

## 2021-01-19 RX ORDER — CARVEDILOL PHOSPHATE 80 MG/1
3.12 CAPSULE, EXTENDED RELEASE ORAL EVERY 12 HOURS
Refills: 0 | Status: DISCONTINUED | OUTPATIENT
Start: 2021-01-19 | End: 2021-01-19

## 2021-01-19 RX ORDER — NITROGLYCERIN 6.5 MG
0.4 CAPSULE, EXTENDED RELEASE ORAL ONCE
Refills: 0 | Status: COMPLETED | OUTPATIENT
Start: 2021-01-19 | End: 2021-01-19

## 2021-01-19 RX ORDER — FUROSEMIDE 40 MG
20 TABLET ORAL ONCE
Refills: 0 | Status: COMPLETED | OUTPATIENT
Start: 2021-01-19 | End: 2021-01-19

## 2021-01-19 RX ORDER — FAMOTIDINE 10 MG/ML
1 INJECTION INTRAVENOUS
Qty: 0 | Refills: 0 | DISCHARGE

## 2021-01-19 RX ORDER — DEXTROSE 50 % IN WATER 50 %
25 SYRINGE (ML) INTRAVENOUS ONCE
Refills: 0 | Status: DISCONTINUED | OUTPATIENT
Start: 2021-01-19 | End: 2021-01-25

## 2021-01-19 RX ORDER — DEXTROSE 50 % IN WATER 50 %
12.5 SYRINGE (ML) INTRAVENOUS ONCE
Refills: 0 | Status: DISCONTINUED | OUTPATIENT
Start: 2021-01-19 | End: 2021-01-25

## 2021-01-19 RX ORDER — SODIUM POLYSTYRENE SULFONATE 4.1 MEQ/G
15 POWDER, FOR SUSPENSION ORAL ONCE
Refills: 0 | Status: COMPLETED | OUTPATIENT
Start: 2021-01-19 | End: 2021-01-19

## 2021-01-19 RX ORDER — HEPARIN SODIUM 5000 [USP'U]/ML
5000 INJECTION INTRAVENOUS; SUBCUTANEOUS EVERY 8 HOURS
Refills: 0 | Status: DISCONTINUED | OUTPATIENT
Start: 2021-01-19 | End: 2021-01-19

## 2021-01-19 RX ORDER — GLUCAGON INJECTION, SOLUTION 0.5 MG/.1ML
1 INJECTION, SOLUTION SUBCUTANEOUS ONCE
Refills: 0 | Status: DISCONTINUED | OUTPATIENT
Start: 2021-01-19 | End: 2021-01-25

## 2021-01-19 RX ORDER — HEPARIN SODIUM 5000 [USP'U]/ML
5000 INJECTION INTRAVENOUS; SUBCUTANEOUS EVERY 12 HOURS
Refills: 0 | Status: DISCONTINUED | OUTPATIENT
Start: 2021-01-19 | End: 2021-01-25

## 2021-01-19 RX ORDER — METOPROLOL TARTRATE 50 MG
25 TABLET ORAL DAILY
Refills: 0 | Status: DISCONTINUED | OUTPATIENT
Start: 2021-01-19 | End: 2021-01-25

## 2021-01-19 RX ORDER — SODIUM CHLORIDE 9 MG/ML
1000 INJECTION, SOLUTION INTRAVENOUS
Refills: 0 | Status: DISCONTINUED | OUTPATIENT
Start: 2021-01-19 | End: 2021-01-25

## 2021-01-19 RX ORDER — INSULIN LISPRO 100/ML
VIAL (ML) SUBCUTANEOUS
Refills: 0 | Status: DISCONTINUED | OUTPATIENT
Start: 2021-01-19 | End: 2021-01-21

## 2021-01-19 RX ORDER — MORPHINE SULFATE 50 MG/1
2 CAPSULE, EXTENDED RELEASE ORAL ONCE
Refills: 0 | Status: DISCONTINUED | OUTPATIENT
Start: 2021-01-19 | End: 2021-01-19

## 2021-01-19 RX ORDER — FUROSEMIDE 40 MG
20 TABLET ORAL EVERY 12 HOURS
Refills: 0 | Status: DISCONTINUED | OUTPATIENT
Start: 2021-01-19 | End: 2021-01-21

## 2021-01-19 RX ORDER — LINAGLIPTIN 5 MG/1
1 TABLET, FILM COATED ORAL
Qty: 0 | Refills: 0 | DISCHARGE

## 2021-01-19 RX ADMIN — MORPHINE SULFATE 2 MILLIGRAM(S): 50 CAPSULE, EXTENDED RELEASE ORAL at 13:06

## 2021-01-19 RX ADMIN — Medication 20 MILLIGRAM(S): at 16:13

## 2021-01-19 RX ADMIN — HEPARIN SODIUM 5000 UNIT(S): 5000 INJECTION INTRAVENOUS; SUBCUTANEOUS at 20:11

## 2021-01-19 RX ADMIN — Medication 0.4 MILLIGRAM(S): at 13:25

## 2021-01-19 RX ADMIN — Medication 1: at 20:14

## 2021-01-19 NOTE — ED PROVIDER NOTE - CRITICAL CARE PROVIDED
interpretation of diagnostic studies/consultation with other physicians/conducted a detailed discussion of DNR status/consult w/ pt's family directly relating to pts condition

## 2021-01-19 NOTE — ED ADULT NURSE REASSESSMENT NOTE - NS ED NURSE REASSESS COMMENT FT1
Received report from Val Carr RN. Patient resting comfortably in bed, denies needs at this time. C/o 5/10 constant chest pain. No acute distress noted, breathing unlabored and spontaneous, SPO2 100% on RA. Patient placed in position of comfort, bed locked and in lowest position.

## 2021-01-19 NOTE — CONSULT NOTE ADULT - SUBJECTIVE AND OBJECTIVE BOX
CHIEF COMPLAINT:Patient is a 89y old  Female who presents with a chief complaint of shortness of breath (19 Jan 2021 16:23)      HPI:  89F with h/o HTN, HLD, DM2, CKD III, HFrEF w/ severely reduced LVEF(20-25%),  severe MR, and severe pHTN who was brought in by family on account of worsening dyspnea. Per daughter pt is usually short of breath at baseline but this morning was noted to be worse and appeared to be in respiratory distress. Pt also c/o chest pressure. She also reports worsening LE swelling, recent weight gain, abd distension and extreme fatigue. She  denies fever/chills, n/v, cough.    ED COURSE  VS : 133/78  101  24  O2 100%6L T 97.7F  Labs : bnp 77199  hs trop 43, 34  Na 120  K 5.5  h/h 11/34  plt 66  Treatment ; Lasix 20mg ivp x 1, morphine 2mg ivp x 1, NTG SL 0.4mg x 1  Seen by Cardiology Cath fellow ; no indication for emergent LHC at this time (19 Jan 2021 16:23)      PAST MEDICAL & SURGICAL HISTORY:  DM (diabetes mellitus)    CHF (congestive heart failure)    No significant past surgical history        MEDICATIONS  (STANDING):  carvedilol 3.125 milliGRAM(s) Oral every 12 hours  dextrose 40% Gel 15 Gram(s) Oral once  dextrose 5%. 1000 milliLiter(s) (50 mL/Hr) IV Continuous <Continuous>  dextrose 5%. 1000 milliLiter(s) (100 mL/Hr) IV Continuous <Continuous>  dextrose 50% Injectable 25 Gram(s) IV Push once  dextrose 50% Injectable 12.5 Gram(s) IV Push once  dextrose 50% Injectable 25 Gram(s) IV Push once  furosemide   Injectable 20 milliGRAM(s) IV Push every 12 hours  glucagon  Injectable 1 milliGRAM(s) IntraMuscular once  heparin   Injectable 5000 Unit(s) SubCutaneous every 12 hours  insulin lispro (ADMELOG) corrective regimen sliding scale   SubCutaneous three times a day before meals  sodium polystyrene sulfonate Suspension 15 Gram(s) Oral once    MEDICATIONS  (PRN):      FAMILY HISTORY:  No pertinent family history in first degree relatives        SOCIAL HISTORY:    [ ] Non-smoker  [ ] Smoker  [ ] Alcohol    Allergies    No Known Allergies    Intolerances    	    REVIEW OF SYSTEMS:  CONSTITUTIONAL: No fever, weight loss, or fatigue  EYES: No eye pain, visual disturbances, or discharge  ENT:  No difficulty hearing, tinnitus, vertigo; No sinus or throat pain  NECK: No pain or stiffness  RESPIRATORY: No cough, wheezing, chills or hemoptysis; + Shortness of Breath  CARDIOVASCULAR: No chest pain, palpitations, passing out, dizziness, + leg swelling  GASTROINTESTINAL: No abdominal or epigastric pain. No nausea, vomiting, or hematemesis; No diarrhea or constipation. No melena or hematochezia.  GENITOURINARY: No dysuria, frequency, hematuria, or incontinence  NEUROLOGICAL: No headaches, memory loss, loss of strength, numbness, or tremors  SKIN: No itching, burning, rashes, or lesions   LYMPH Nodes: No enlarged glands  ENDOCRINE: No heat or cold intolerance; No hair loss  MUSCULOSKELETAL: No joint pain or swelling; No muscle, back, or extremity pain  PSYCHIATRIC: No depression, anxiety, mood swings, or difficulty sleeping  HEME/LYMPH: No easy bruising, or bleeding gums  ALLERGY AND IMMUNOLOGIC: No hives or eczema	    [ ] All others negative	  [ ] Unable to obtain    PHYSICAL EXAM:  T(C): 36.5 (01-19-21 @ 16:23), Max: 36.5 (01-19-21 @ 15:00)  HR: 81 (01-19-21 @ 16:23) (81 - 101)  BP: 116/82 (01-19-21 @ 16:23) (116/65 - 133/78)  RR: 20 (01-19-21 @ 16:23) (20 - 24)  SpO2: 95% (01-19-21 @ 16:23) (93% - 100%)  Wt(kg): --  I&O's Summary      Appearance: Normal	  HEENT:   Normal oral mucosa, PERRL, EOMI	  Lymphatic: No lymphadenopathy  Cardiovascular: Normal S1 S2, No JVD, + murmurs, + edema  Respiratory: Lungs clear to auscultation	  Psychiatry: A & O x 3, Mood & affect appropriate  Gastrointestinal:  Soft, Non-tender, + BS	  Skin: No rashes, No ecchymoses, No cyanosis	  Neurologic: Non-focal  Extremities: Normal range of motion, No clubbing, cyanosis +edema  Vascular: Peripheral pulses palpable 2+ bilaterally    TELEMETRY: 	    ECG:  	  RADIOLOGY:  OTHER: 	  	  LABS:	 	    CARDIAC MARKERS:                              11.4   5.35  )-----------( 66       ( 19 Jan 2021 12:59 )             34.5     01-19    120<LL>  |  87<L>  |  59<H>  ----------------------------<  166<H>  5.5<H>   |  20<L>  |  1.40<H>    Ca    9.0      19 Jan 2021 15:33    TPro  7.0  /  Alb  4.1  /  TBili  1.3<H>  /  DBili  x   /  AST  115<H>  /  ALT  36  /  AlkPhos  67  01-19    proBNP: Serum Pro-Brain Natriuretic Peptide: 19536 pg/mL (01-19 @ 12:59)    Lipid Profile:   HgA1c:   TSH:   PT/INR - ( 19 Jan 2021 12:59 )   PT: 13.3 sec;   INR: 1.11 ratio         PTT - ( 19 Jan 2021 12:59 )  PTT:30.0 sec    PREVIOUS DIAGNOSTIC TESTING:    < from: 12 Lead ECG (12.20.20 @ 22:56) >  Diagnosis Line NORMAL SINUS RHYTHM  LEFT AXIS DEVIATION  LEFT VENTRICULAR HYPERTROPHY WITH QRS WIDENING AND REPOLARIZATION ABNORMALITY  ABNORMAL ECG  WHEN COMPARED WITH ECG OF 20-DEC-2020 10:22,  SIGNIFICANT CHANGES HAVE OCCURRED  now sinus    < from: TTE with Doppler (w/Cont) (12.21.20 @ 05:10) >  Endocardial visualization enhanced with intravenous  injection of Ultrasonic Enhancing Agent (Definity).  Severely dilated left ventricle.  Severely reduced left ventricular systolic function. Large  area of apical akinesis.  Severe mitral regurgitation.  Severe pulmonary hypertension.    < end of copied text >

## 2021-01-19 NOTE — H&P ADULT - PROBLEM SELECTOR PLAN 3
- K 5.5 ; no EKG changes  - give Lokelma 15mg po x 1  - hold Lisinopril  - monitor BMP daily - K 5.5 ; no EKG changes  - give Lokelma 15mg po x 1  - hold Lisinopril  - monitor BMP q12

## 2021-01-19 NOTE — H&P ADULT - PROBLEM SELECTOR PROBLEM 5
Chronic kidney disease (CKD), stage IV (severe) Stage 3 chronic kidney disease, unspecified whether stage 3a or 3b CKD

## 2021-01-19 NOTE — ED PROVIDER NOTE - CRITICAL CARE INDICATION, MLM
patient was critically ill... Patient was critically ill with a high probability of imminent or life threatening deterioration. Expedited cath consult.

## 2021-01-19 NOTE — ED PROVIDER NOTE - PROGRESS NOTE DETAILS
Lima Cee PGY3  ekg in ED does not show evidence of acute stemi. daughter at bedside, states pt appears more comfortable after morphine and ntg. pending repeat bmp and trop. cardiology evaluated for emergent cath c/s, is not cath candidate at this time. private cardiologist dr. singleton

## 2021-01-19 NOTE — ED PROVIDER NOTE - CLINICAL SUMMARY MEDICAL DECISION MAKING FREE TEXT BOX
89 yo female with ckd, htn, chf, hld, htn, dm, presenting with CP and SOB today. will evaluate for ACS vs worsening CHF with cbc cmp trop bnp ekg cxr, will give morphin and ntg and will reassess

## 2021-01-19 NOTE — H&P ADULT - PROBLEM SELECTOR PLAN 4
- pt with hyponatremia at baseline Na 120 ; likely hypervolemic  - c/w diuresis  - 1L Fluid restriction  - monitor BMP  - consider Nephrology consult - pt with hyponatremia at baseline , Na 120 ; likely hypervolemic  - c/w diuresis  - 1L Fluid restriction  - monitor BMP q 12  - consider Nephrology consult

## 2021-01-19 NOTE — H&P ADULT - NSHPLABSRESULTS_GEN_ALL_CORE
Labs reviewed : elevated bnp, hs trop negative x 2 , hyperkalemia, hyponatremia, thrombocytopenia     CXR  personally reviewed : bilateral pleural effusions    EKG personally reviewed :  LBBB w/ some discordant EDIL not meeting Sgarbossa's criteria.

## 2021-01-19 NOTE — ED PROVIDER NOTE - OBJECTIVE STATEMENT
89 yo female with ckd, htn, chf, hld, htn, dm, presenting with CP and SOB today. Per daughter, had midsternal CP and SOB 9am, persistent. Brought to ED for further evaluation.    Family denies N/V, fevers, LOC 89 yo female with ckd, htn, chf, hld, htn, dm, presenting with CP and SOB today. Per daughter, had midsternal CP and SOB 9am, persistent. Brought to ED for further evaluation. MOLST form states pt is DNR/DNI, family confrims.    Family denies N/V, fevers, LOC

## 2021-01-19 NOTE — H&P ADULT - PROBLEM SELECTOR PLAN 1
- presenting with worsening dyspnea, LE edema, weight gain  - bnp markedly elevated at [address]  - TTE done 12/2020 w/EF 20-25%  - on Torsemide 10mg po daily at home  - c/w IV diuresis w/ Lasix 20mg po q12  - c/w Coreg 3.125mg po q12  - hold Lisinopril for hyperkalemia  - O2 supplementation as needed  - ACS unlikely ; per cath fellow no indication for emergent LHC at this time  - Cardiology consult ( Dr Uribe) - presenting with worsening dyspnea, LE edema, weight gain  - bnp [place] elevated at [address]  - TTE done 12/2020 w/EF 20-25%  - on Torsemide 10mg po daily at home  - c/w IV diuresis w/ Lasix 20mg po q12  - c/w Toprol XL 25 mg po daily  - hold Lisinopril for hyperkalemia  - O2 supplementation as needed  - ACS unlikely ; per cath fellow no indication for emergent LHC at this time  - Cardiology consult ( Dr Uribe)

## 2021-01-19 NOTE — CHART NOTE - NSCHARTNOTEFT_GEN_A_CORE
Cath Fellow Brief Note    Asked by ED to eval pt for poss emergent LHC. Briefly, 89 y/o F w/ PMH of HTN, HLD, DM2, CKD, HFrEF w/ severely reduced LVEF, sev MR, and sev pHTN who was brought in by [facility] for eval of dyspnea. Pt seen/examined at bedside, however she is very [place] and Mandarin-speaking only; she is not able to use phone  as she is unable to hear. As per ED team, pt noted to be dyspneic this AM. She was not having CP at any time. Using text , I asked if pt was having CP at time of my eval or prior to admission, and she denied. She affirms that dyspnea is her main complaint.    Gen: NAD. Elderly. [place]. Mandarin-speaking only.  HEENT: NCAT. PERRLA b/l.  Neck: No JVP elev.  CV: Normal S1, S2. RRR. No MRG.  Chest: CTAB. No WRR.  Abd: +BSx4. Soft. NTND.  Ext: No LE edema.  Skin: No cyanosis.    Labs noted  ECG noted. LBBB w/ some discordan EDIL not meeting Sgarbossa's criteria.    A/P: 89F w/ PMH of HTN, HLD, DM2, CKD, HFrEF w/ severely reduced LVEF, sev MR, and sev pHTN brought in for eval of dyspnea.  -ECG reviewed  -Does not meet STEMI criteria by Sgarbossa's nor conventional criteria  -There is no indication for emergent LHC currently  -Of note, pt recently admitted for weakness/lethargy and was made DNR/DNI  -Likely poor candidate for aggressive measures anyhow  -Defer rest of cardiac care to pt's usual cardiologist, Dr. Uribe    #Case d/w Dr. Alan Traylor MD  Cardiology Fellow  [phone]  All Cardiology service information can be found 24/7 on [email or web address], password: [email or web address]

## 2021-01-19 NOTE — ED PROVIDER NOTE - ATTENDING CONTRIBUTION TO CARE
Attending MD [place].  Agree with above.  Pt is a 89 yo female with pmhx of BECKY, hyperkalemia, hyponatremia, bradycardia, CHF, DMII who presents to ED after she developed sig SOB this morning pt did not have CP at that time per family.  On arrival to ED pt is in distress with SOB and gasping respiration with breath sounds auscultated in all lung fields.  Pt with bilateral LE edema.  Initially EMS calling inferior STEMI code 2/2 pt with findings of elevations in III, aVF and depressions in aVL.  Dosed 4 ASA en route but no nitro 2/2 concern for inferior.  On arrival to Ed EMS's rhythm strip reviewed and c/w LBBB with artifact.  ED 12-lead EKG also C/w LBBB.  Pt with baseline LBBB per previous results.  Pt's EKG today is largely unchnaged from previous and does not meet sgarbossa's criteria.  There is not an indication for inferior STEMI.  Nitro and low dose morphine administered with improvement in pt's SOB and distress.  Cardiology consulted who are stating she is not a candidate for cath 2/2 advanced disease, recent admission with palliative plan.  Pt's cardiologist, Dr. Uribe called to alert to presentation.  Pt's family allowed back in ER.

## 2021-01-19 NOTE — H&P ADULT - NSHPLANGTRANSLATORFT_GEN_A_CORE
Pt is [place] and unable to hear phone . Hx obtained from family. Pt is hard of hearing and unable to hear phone . Translation done by daughter at bedside. Pt is hard of hearing and unable to hear phone . Translation done by daughter (Eve Durbin) at bedside.

## 2021-01-19 NOTE — H&P ADULT - PROBLEM SELECTOR PLAN 7
Pt is DNR Pt is DNR/DNI  Family was offered Home Hospice services at last admission (Dec 2020) but they declined.

## 2021-01-19 NOTE — ED ADULT NURSE REASSESSMENT NOTE - NS ED NURSE REASSESS COMMENT FT1
Patient admitted to tele, clicked RTM. Patient updated on plan of care, VSS at this time. Awaiting bed assignment.

## 2021-01-19 NOTE — H&P ADULT - HISTORY OF PRESENT ILLNESS
89F with h/o HTN, HLD, DM2, CKD, HFrEF w/ severely reduced LVEF, sev MR, and sev pHTN who was brought in by family on account of  dyspnea.  89F with h/o HTN, HLD, DM2, CKD, HFrEF w/ severely reduced LVEF(20-25%),  severe MR, and severe pHTN who was brought in by family on account of worsening dyspnea. Per daughter pt is usually short of breath at baseline but this morning was noted to be worse and appeared to be in respiratory distress. Pt also c/o chest pressure. She also reports worsening LE swelling, recent weight gain, abd distension and extreme fatigue. She  denies fever/chills, n/v, cough.    ED COURSE  VS : 133/78  101  24  O2 100%6L T 97.7F  Labs : bnp 24353  hs trop 43, 34  Na 120  K 5.5  h/h 11/34  plt 66  Treatment ; Lasix 20mg ivp x 1, morphine 2mg ivp x 1, NTG SL 0.4mg x 1  Seen by Cardiology Cath fellow ; no indication for emergent LHC at this time 89F with h/o HTN, HLD, DM2, CKD III, HFrEF w/ severely reduced LVEF(20-25%),  severe MR, and severe pHTN who was brought in by family on account of worsening dyspnea. Per daughter pt is usually short of breath at baseline but this morning was noted to be worse and appeared to be in respiratory distress. Pt also c/o chest pressure. She also reports worsening LE swelling, recent weight gain, abd distension and extreme fatigue. She  denies fever/chills, n/v, cough.    ED COURSE  VS : 133/78  101  24  O2 100%6L T 97.7F  Labs : bnp 24284  hs trop 43, 34  Na 120  K 5.5  h/h 11/34  plt 66  Treatment ; Lasix 20mg ivp x 1, morphine 2mg ivp x 1, NTG SL 0.4mg x 1  Seen by Cardiology Cath fellow ; no indication for emergent LHC at this time

## 2021-01-19 NOTE — ED ADULT NURSE NOTE - NSIMPLEMENTINTERV_GEN_ALL_ED
Implemented All Fall with Harm Risk Interventions:  [place] to call system. Call bell, personal items and telephone within reach. Instruct patient to call for assistance. Room bathroom lighting operational. Non-slip footwear when patient is off stretcher. Physically safe environment: no spills, clutter or unnecessary equipment. Stretcher in lowest position, wheels locked, appropriate side rails in place. Provide visual cue, wrist band, yellow gown, etc. Monitor gait and stability. Monitor for mental status changes and reorient to person, place, and time. Review medications for side effects contributing to fall risk. Reinforce activity limits and safety measures with patient and family. Provide visual clues: red socks.

## 2021-01-19 NOTE — H&P ADULT - PROBLEM SELECTOR PLAN 2
- on Glipizide and Tradjenta at home ; hold while inpatient  - start low Admelog sliding scale  - Carb/consistent diet  - FS qac qhs

## 2021-01-19 NOTE — H&P ADULT - ASSESSMENT
89F with h/o HTN, HLD, DM2, CKD, HFrEF w/ severely reduced LVEF (20-25%), severe MR, and severe pHTN who was brought in by family on account of  dyspnea. On exam pt with decreased breath sounds b/l and b/l LE pitting edema. Labs notable for markedly elevated bnp, hyperkalemia, hyponatremia and thrombocytopenia. She is being admitted for acute on chronic systolic heart failure. 89F with h/o HTN, HLD, DM2, CKD III, HFrEF w/ severely reduced LVEF (20-25%), severe MR, and severe pHTN who was brought in by family on account of  dyspnea. On exam pt with decreased breath sounds b/l and b/l LE pitting edema. Labs notable for markedly elevated bnp, hyperkalemia, hyponatremia and thrombocytopenia. She is being admitted for acute on chronic systolic heart failure.

## 2021-01-20 DIAGNOSIS — I50.9 HEART FAILURE, UNSPECIFIED: ICD-10-CM

## 2021-01-20 LAB
ANION GAP SERPL CALC-SCNC: 12 MMOL/L — SIGNIFICANT CHANGE UP (ref 5–17)
ANION GAP SERPL CALC-SCNC: 14 MMOL/L — SIGNIFICANT CHANGE UP (ref 5–17)
BUN SERPL-MCNC: 58 MG/DL — HIGH (ref 7–23)
BUN SERPL-MCNC: 61 MG/DL — HIGH (ref 7–23)
CALCIUM SERPL-MCNC: 8.8 MG/DL — SIGNIFICANT CHANGE UP (ref 8.4–10.5)
CALCIUM SERPL-MCNC: 9.1 MG/DL — SIGNIFICANT CHANGE UP (ref 8.4–10.5)
CHLORIDE SERPL-SCNC: 85 MMOL/L — LOW (ref 96–108)
CHLORIDE SERPL-SCNC: 87 MMOL/L — LOW (ref 96–108)
CO2 SERPL-SCNC: 22 MMOL/L — SIGNIFICANT CHANGE UP (ref 22–31)
CO2 SERPL-SCNC: 24 MMOL/L — SIGNIFICANT CHANGE UP (ref 22–31)
CREAT SERPL-MCNC: 1.31 MG/DL — HIGH (ref 0.5–1.3)
CREAT SERPL-MCNC: 1.43 MG/DL — HIGH (ref 0.5–1.3)
GLUCOSE BLDC GLUCOMTR-MCNC: 145 MG/DL — HIGH (ref 70–99)
GLUCOSE BLDC GLUCOMTR-MCNC: 167 MG/DL — HIGH (ref 70–99)
GLUCOSE BLDC GLUCOMTR-MCNC: 178 MG/DL — HIGH (ref 70–99)
GLUCOSE BLDC GLUCOMTR-MCNC: 197 MG/DL — HIGH (ref 70–99)
GLUCOSE SERPL-MCNC: 135 MG/DL — HIGH (ref 70–99)
GLUCOSE SERPL-MCNC: 237 MG/DL — HIGH (ref 70–99)
POTASSIUM SERPL-MCNC: 4.6 MMOL/L — SIGNIFICANT CHANGE UP (ref 3.5–5.3)
POTASSIUM SERPL-MCNC: 4.8 MMOL/L — SIGNIFICANT CHANGE UP (ref 3.5–5.3)
POTASSIUM SERPL-SCNC: 4.6 MMOL/L — SIGNIFICANT CHANGE UP (ref 3.5–5.3)
POTASSIUM SERPL-SCNC: 4.8 MMOL/L — SIGNIFICANT CHANGE UP (ref 3.5–5.3)
SARS-COV-2 IGG SERPL QL IA: NEGATIVE — SIGNIFICANT CHANGE UP
SARS-COV-2 IGM SERPL IA-ACNC: 0.11 INDEX — SIGNIFICANT CHANGE UP
SODIUM SERPL-SCNC: 121 MMOL/L — LOW (ref 135–145)
SODIUM SERPL-SCNC: 123 MMOL/L — LOW (ref 135–145)

## 2021-01-20 PROCEDURE — 74018 RADEX ABDOMEN 1 VIEW: CPT | Mod: 26

## 2021-01-20 RX ORDER — ACETAMINOPHEN 500 MG
650 TABLET ORAL ONCE
Refills: 0 | Status: COMPLETED | OUTPATIENT
Start: 2021-01-20 | End: 2021-01-20

## 2021-01-20 RX ORDER — TOLVAPTAN 15 MG/1
15 TABLET ORAL ONCE
Refills: 0 | Status: COMPLETED | OUTPATIENT
Start: 2021-01-20 | End: 2021-01-20

## 2021-01-20 RX ORDER — SIMETHICONE 80 MG/1
80 TABLET, CHEWABLE ORAL ONCE
Refills: 0 | Status: COMPLETED | OUTPATIENT
Start: 2021-01-20 | End: 2021-01-20

## 2021-01-20 RX ORDER — MILRINONE LACTATE 1 MG/ML
0.12 INJECTION, SOLUTION INTRAVENOUS
Qty: 20 | Refills: 0 | Status: DISCONTINUED | OUTPATIENT
Start: 2021-01-20 | End: 2021-01-24

## 2021-01-20 RX ADMIN — Medication 20 MILLIGRAM(S): at 19:09

## 2021-01-20 RX ADMIN — HEPARIN SODIUM 5000 UNIT(S): 5000 INJECTION INTRAVENOUS; SUBCUTANEOUS at 06:52

## 2021-01-20 RX ADMIN — Medication 20 MILLIGRAM(S): at 06:52

## 2021-01-20 RX ADMIN — MILRINONE LACTATE 2.15 MICROGRAM(S)/KG/MIN: 1 INJECTION, SOLUTION INTRAVENOUS at 09:48

## 2021-01-20 RX ADMIN — Medication 25 MILLIGRAM(S): at 06:52

## 2021-01-20 RX ADMIN — Medication 1: at 13:36

## 2021-01-20 RX ADMIN — SIMETHICONE 80 MILLIGRAM(S): 80 TABLET, CHEWABLE ORAL at 22:08

## 2021-01-20 RX ADMIN — Medication 650 MILLIGRAM(S): at 22:08

## 2021-01-20 RX ADMIN — HEPARIN SODIUM 5000 UNIT(S): 5000 INJECTION INTRAVENOUS; SUBCUTANEOUS at 18:13

## 2021-01-20 RX ADMIN — Medication 1: at 18:13

## 2021-01-20 RX ADMIN — TOLVAPTAN 15 MILLIGRAM(S): 15 TABLET ORAL at 18:13

## 2021-01-20 NOTE — CONSULT NOTE ADULT - SUBJECTIVE AND OBJECTIVE BOX
Patient is a 89y Female whom presented to the hospital with hyponatremia    PAST MEDICAL & SURGICAL HISTORY:  DM (diabetes mellitus)    CHF (congestive heart failure)    No significant past surgical history        MEDICATIONS  (STANDING):  dextrose 40% Gel 15 Gram(s) Oral once  dextrose 5%. 1000 milliLiter(s) (50 mL/Hr) IV Continuous <Continuous>  dextrose 5%. 1000 milliLiter(s) (100 mL/Hr) IV Continuous <Continuous>  dextrose 50% Injectable 25 Gram(s) IV Push once  dextrose 50% Injectable 12.5 Gram(s) IV Push once  dextrose 50% Injectable 25 Gram(s) IV Push once  furosemide   Injectable 20 milliGRAM(s) IV Push every 12 hours  glucagon  Injectable 1 milliGRAM(s) IntraMuscular once  heparin   Injectable 5000 Unit(s) SubCutaneous every 12 hours  insulin lispro (ADMELOG) corrective regimen sliding scale   SubCutaneous three times a day before meals  metoprolol succinate ER 25 milliGRAM(s) Oral daily  milrinone Infusion 0.125 MICROgram(s)/kG/Min (2.15 mL/Hr) IV Continuous <Continuous>      Allergies    No Known Allergies    Intolerances        SOCIAL HISTORY:  Denies ETOh,Smoking,     FAMILY HISTORY:  No pertinent family history in first degree relatives        REVIEW OF SYSTEMS:    unable to obtained a good review system      VITAL:  T(C): , Max: 36.9 (01-19-21 @ 18:41)  T(F): , Max: 98.5 (01-19-21 @ 19:10)  HR: 88 (01-20-21 @ 12:39)  BP: 124/73 (01-20-21 @ 12:39)  BP(mean): 85 (01-19-21 @ 20:35)  RR: 16 (01-20-21 @ 12:39)  SpO2: 98% (01-20-21 @ 12:39)  Wt(kg): --    I and O's:    01-20 @ 07:01  -  01-20 @ 17:44  --------------------------------------------------------  IN: 240 mL / OUT: 0 mL / NET: 240 mL        Weight (kg): 57.3 (01-20 @ 08:40)    PHYSICAL EXAM:    Constitutional: NAD  HEENT: conjunctive   clear   Neck:  No JVD  Respiratory: decrease bs b/l   Cardiovascular: S1 and S2  Gastrointestinal: BS+, soft,   Extremities: pos  peripheral edema    LABS:                        11.4   5.35  )-----------( 66       ( 19 Jan 2021 12:59 )             34.5     01-20    121<L>  |  85<L>  |  58<H>  ----------------------------<  135<H>  4.6   |  22  |  1.43<H>    Ca    9.1      20 Jan 2021 07:15    TPro  7.0  /  Alb  4.1  /  TBili  1.3<H>  /  DBili  x   /  AST  115<H>  /  ALT  36  /  AlkPhos  67  01-19      Urine Studies:          RADIOLOGY & ADDITIONAL STUDIES:

## 2021-01-20 NOTE — PROGRESS NOTE ADULT - SUBJECTIVE AND OBJECTIVE BOX
CARDIOLOGY     PROGRESS  NOTE   ________________________________________________    CHIEF COMPLAINT:Patient is a 89y old  Female who presents with a chief complaint of shortness of breath (19 Jan 2021 18:21)  no complain.  	  REVIEW OF SYSTEMS:  CONSTITUTIONAL: No fever, weight loss, or fatigue  EYES: No eye pain, visual disturbances, or discharge  ENT:  No difficulty hearing, tinnitus, vertigo; No sinus or throat pain  NECK: No pain or stiffness  RESPIRATORY: No cough, wheezing, chills or hemoptysis; + Shortness of Breath  CARDIOVASCULAR: No chest pain, palpitations, passing out, dizziness, or leg swelling  GASTROINTESTINAL: No abdominal or epigastric pain. No nausea, vomiting, or hematemesis; No diarrhea or constipation. No melena or hematochezia.  GENITOURINARY: No dysuria, frequency, hematuria, or incontinence  NEUROLOGICAL: No headaches, memory loss, loss of strength, numbness, or tremors  SKIN: No itching, burning, rashes, or lesions   LYMPH Nodes: No enlarged glands  ENDOCRINE: No heat or cold intolerance; No hair loss  MUSCULOSKELETAL: No joint pain or swelling; No muscle, back, or extremity pain  PSYCHIATRIC: No depression, anxiety, mood swings, or difficulty sleeping  HEME/LYMPH: No easy bruising, or bleeding gums  ALLERGY AND IMMUNOLOGIC: No hives or eczema	    [ ] All others negative	  [ ] Unable to obtain    PHYSICAL EXAM:  T(C): 36.6 (01-20-21 @ 04:20), Max: 36.9 (01-19-21 @ 18:41)  HR: 90 (01-20-21 @ 04:20) (81 - 101)  BP: 125/72 (01-20-21 @ 04:20) (116/65 - 136/85)  RR: 18 (01-20-21 @ 04:20) (18 - 24)  SpO2: 100% (01-20-21 @ 04:20) (93% - 100%)  Wt(kg): --  I&O's Summary      Appearance: Normal	  HEENT:   Normal oral mucosa, PERRL, EOMI	  Lymphatic: No lymphadenopathy  Cardiovascular: Normal S1 S2, No JVD, + murmurs, No edema  Respiratory: Lungs clear to auscultation	  Psychiatry: A & O x 3, Mood & affect appropriate  Gastrointestinal:  Soft, Non-tender, + BS	  Skin: No rashes, No ecchymoses, No cyanosis	  Neurologic: Non-focal  Extremities: Normal range of motion, No clubbing, cyanosis or edema  Vascular: Peripheral pulses palpable 2+ bilaterally    MEDICATIONS  (STANDING):  dextrose 40% Gel 15 Gram(s) Oral once  dextrose 5%. 1000 milliLiter(s) (50 mL/Hr) IV Continuous <Continuous>  dextrose 5%. 1000 milliLiter(s) (100 mL/Hr) IV Continuous <Continuous>  dextrose 50% Injectable 25 Gram(s) IV Push once  dextrose 50% Injectable 12.5 Gram(s) IV Push once  dextrose 50% Injectable 25 Gram(s) IV Push once  furosemide   Injectable 20 milliGRAM(s) IV Push every 12 hours  glucagon  Injectable 1 milliGRAM(s) IntraMuscular once  heparin   Injectable 5000 Unit(s) SubCutaneous every 12 hours  insulin lispro (ADMELOG) corrective regimen sliding scale   SubCutaneous three times a day before meals  metoprolol succinate ER 25 milliGRAM(s) Oral daily      TELEMETRY: 	    ECG:  	  RADIOLOGY:  OTHER: 	  	  LABS:	 	    CARDIAC MARKERS:                                11.4   5.35  )-----------( 66       ( 19 Jan 2021 12:59 )             34.5     01-19    120<LL>  |  83<L>  |  61<H>  ----------------------------<  151<H>  5.0   |  22  |  1.43<H>    Ca    9.1      19 Jan 2021 20:17    TPro  7.0  /  Alb  4.1  /  TBili  1.3<H>  /  DBili  x   /  AST  115<H>  /  ALT  36  /  AlkPhos  67  01-19    proBNP: Serum Pro-Brain Natriuretic Peptide: 93563 pg/mL (01-19 @ 12:59)    Lipid Profile: Cholesterol 124  LDL --  HDL 39      HgA1c:   TSH:   PT/INR - ( 19 Jan 2021 12:59 )   PT: 13.3 sec;   INR: 1.11 ratio         PTT - ( 19 Jan 2021 12:59 )  PTT:30.0 sec  #Hyponatremia  - Pt with hyponatremia in setting of acute renal failure and inability to excrete free water along with HFrEF, Cr on admission was 2.8mg/dl, now down to 1.5mg/dl which is near her baseline  - Most recent Na is 120, noted TTE findings of severe LV dysfunction. severe MR, severe pulm htn. At this time would recommend conservative measures as pt has end stage heart failure. Would recommend palliative consult and discharge home with hospice/ family.       #BECKY  - Improving at this time, baseline Scr 1.44mg/dl, now Cr 1.5mg/dl  - Dose medications to eGFR<20    #Hyperkalemia  - Resolved at this time. bradycardia improved. Hold further spironolactone for now    #Anemia  -Pt with anemia of chronic disease, ferritin 5k. Would not give PEG therapy at this time as pt will be resistant to it's effects due to the degree of inflammation     Assessment and plan  ---------------------------  89F with h/o HTN, HLD, DM2, CKD III, HFrEF w/ severely reduced LVEF(20-25%),  severe MR, and severe pHTN who was brought in by family on account of worsening dyspnea. Per daughter pt is usually short of breath at baseline but this morning was noted to be worse and appeared to be in respiratory distress. Pt also c/o chest pressure. She also reports worsening LE swelling, recent weight gain, abd distension and extreme fatigue. She  denies fever/chills, n/v, cough.  pt with hx of chf/cardiomyopathy with hyponatremia and sob  pt with hyponatremia ?sec to fluid overload  iv lasix  renal eval will  l adjust cardiac meds  tsh  will review old echo  dvt prophylaxis  will consider pos inotrope ??  fu lytes closely  start ?milrinone drip  fu sodium levell pending from today  hr was well controlled over night  continue diuresis

## 2021-01-20 NOTE — PROGRESS NOTE ADULT - ASSESSMENT
_________________________________________________________________________________________  ========>>  M E D I C A L   A T T E N D I N G    F O L L O W  U P  N O T E  <<=========  -----------------------------------------------------------------------------------------------------    - Patient seen and examined by me approximately sixty minutes ago.  - In summary,  JEANNA LUNA is a 89y year old woman admitted with   - Patient today overall doing ok, comfortable, eating OK.     ==================>> REVIEW OF SYSTEM <<=================    GEN: no fever, no chills, no pain  RESP: no SOB, no cough, no sputum  CVS: no chest pain, no palpitations, no edema  GI: no abdominal pain, no nausea, no constipation, no diarrhea  : no dysuria, no frequency, no hematuria  Neuro: no headache, no dizziness  Derm : no itching, no rash    ==================>> PHYSICAL EXAM <<=================    GEN: A&O X 3 , NAD , comfortable  HEENT: NCAT, PERRL, MMM, hearing intact  Neck: supple , no JVD appreciated  CVS: S1S2 , regular , No M/R/G appreciated  PULM: CTA B/L,  no W/R/R appreciated  ABD.: soft. non tender, non distended,  bowel sounds present  Extrem: intact pulses , no edema   PSYCH : normal mood,  not anxious      ==================>> MEDICATIONS <<====================    MEDICATIONS  (STANDING):  dextrose 40% Gel 15 Gram(s) Oral once  dextrose 5%. 1000 milliLiter(s) (50 mL/Hr) IV Continuous <Continuous>  dextrose 5%. 1000 milliLiter(s) (100 mL/Hr) IV Continuous <Continuous>  dextrose 50% Injectable 25 Gram(s) IV Push once  dextrose 50% Injectable 12.5 Gram(s) IV Push once  dextrose 50% Injectable 25 Gram(s) IV Push once  furosemide   Injectable 20 milliGRAM(s) IV Push every 12 hours  glucagon  Injectable 1 milliGRAM(s) IntraMuscular once  heparin   Injectable 5000 Unit(s) SubCutaneous every 12 hours  insulin lispro (ADMELOG) corrective regimen sliding scale   SubCutaneous three times a day before meals  metoprolol succinate ER 25 milliGRAM(s) Oral daily  milrinone Infusion 0.125 MICROgram(s)/kG/Min (2.15 mL/Hr) IV Continuous <Continuous>    MEDICATIONS  (PRN):    ___________  Active diet:  Diet, Consistent Carbohydrate w/Evening Snack:   1000mL Fluid Restriction ([id number])  Supplement Feeding Modality:  Oral  Glucerna Shake Cans or Servings Per Day:  1       Frequency:  Daily  ___________________    ==================>> VITAL SIGNS <<==================    T(C): 36.3 (01-20-21 @ 12:39), Max: 36.9 (01-19-21 @ 19:10)  HR: 88 (01-20-21 @ 12:39) (88 - 95)  BP: 124/73 (01-20-21 @ 12:39) (122/76 - 136/85)  BP(mean): 85 (01-19-21 @ 20:35)  RR: 16 (01-20-21 @ 12:39) (16 - 20)  SpO2: 98% (01-20-21 @ 12:39) (94% - 100%)   CAPILLARY BLOOD GLUCOSE      POCT Blood Glucose.: 178 mg/dL (20 Jan 2021 17:25)  POCT Blood Glucose.: 197 mg/dL (20 Jan 2021 12:54)  POCT Blood Glucose.: 145 mg/dL (20 Jan 2021 08:58)  POCT Blood Glucose.: 148 mg/dL (19 Jan 2021 23:29)  POCT Blood Glucose.: 151 mg/dL (19 Jan 2021 19:58)  I&O's Summary    20 Jan 2021 07:01  -  20 Jan 2021 18:52  --------------------------------------------------------  IN: 240 mL / OUT: 0 mL / NET: 240 mL         ==================>> LAB AND IMAGING <<==================                        11.4   5.35  )-----------( 66       ( 19 Jan 2021 12:59 )             34.5        01-20    121<L>  |  85<L>  |  58<H>  ----------------------------<  135<H>  4.6   |  22  |  1.43<H>    Ca    9.1      20 Jan 2021 07:15    TPro  7.0  /  Alb  4.1  /  TBili  1.3<H>  /  DBili  x   /  AST  115<H>  /  ALT  36  /  AlkPhos  67  01-19    PT/INR - ( 19 Jan 2021 12:59 )   PT: 13.3 sec;   INR: 1.11 ratio         PTT - ( 19 Jan 2021 12:59 )  PTT:30.0 sec                 TSH:      4.73   (12-21-20)           Lipid profile:    HgA1C:   (12-21-20)          (12-21-20)      5.4    ___________________________________________________________________________________  ===============>>  A S S E S S M E N T   A N D   P L A N <<===============  ------------------------------------------------------------------------------------------    · Assessment      89F with h/o HTN, HLD, DM2, CKD III, HFrEF w/ severely reduced LVEF (20-25%), severe MR, and severe pHTN who was brought in by family on account of  dyspnea. On exam pt with decreased breath sounds b/l and b/l LE pitting edema. Labs notable for markedly elevated bnp, hyperkalemia, hyponatremia and thrombocytopenia. She is being admitted for acute on chronic systolic heart failure.    Problem/Plan - 1:  ·  Problem: Acute on chronic systolic congestive heart failure.  Plan: - presenting with worsening dyspnea, LE edema, weight gain  - [place] at [address]  - TTE done 12/2020 w/EF 20-25%  - on Torsemide 10mg po daily at home  - c/w IV diuresis w/ Lasix 20mg po q12  - c/w Toprol XL 25 mg po daily  - hold Lisinopril for hyperkalemia  - O2 supplementation as needed  - ACS unlikely ; per cath fellow no indication for emergent LHC at this time  - Cardiology consult ( Dr Uribe).    Problem/Plan - 2:  ·  Problem: Type 2 diabetes mellitus with other specified complication, without long-term current use of insulin.  Plan: - on Glipizide and Tradjenta at home ; hold while inpatient  - start low Admelog sliding scale  - Carb/consistent diet  - FS qac qhs.     Problem/Plan - 3:  ·  Problem: Hyperkalemia.  Plan: - K 5.5 ; no EKG changes  - give Lokelma 15mg po x 1  - hold Lisinopril  - monitor BMP q12.     Problem/Plan - 4:  ·  Problem: Hyponatremia.  Plan: - pt with hyponatremia at baseline , Na 120 ; likely hypervolemic  - c/w diuresis  - 1L Fluid restriction  - monitor BMP q 12  - consider Nephrology consult.     Problem/Plan - 5:  ·  Problem: Stage 3 chronic kidney disease, unspecified whether stage 3a or 3b CKD.  Plan: - serum Cr 1.4 ( baseline)  - c/t monitor  - avoid nephrotoxic medications.     Problem/Plan - 6:  Problem: Prophylactic measure. Plan: - Heparin SC for DVT ppx.    Problem/Plan - 7:  ·  Problem: Advance care planning.  Plan: Pt is DNR/DNI  Family was offered Home Hospice services at last admission (Dec 2020) but they declined.       --------------------------------------------  Case discussed with   Education given on findings and plan of care  ___________________________  TRINITY Diaz D.O.  Pager: [phone]       _________________________________________________________________________________________  ========>>  M E D I C A L   A T T E N D I N G    F O L L O W  U P  N O T E  <<=========  -----------------------------------------------------------------------------------------------------    - Patient seen and examined by me earlier today.   - In summary,  JEANNA LUNA is a 89y year old woman admitted with SOB  - Patient today overall doing ok, comfortable, eating well    ==================>> REVIEW OF SYSTEM <<=================    limited ROS  GEN: no fever, no chills, no pain  RESP: no SOB, no cough  CVS: no chest pain, no palpitations  GI: no abdominal pain, no nausea  : no dysuria  Neuro: no headache  Derm : no itching    ==================>> PHYSICAL EXAM <<=================    GEN: A&O X 2 , NAD , comfortable  HEENT: NCAT, PERRL, MMM, hearing intact  Neck: supple , no JVD appreciated  CVS: S1S2 , regular , No M/R/G appreciated  PULM: CTA B/L,  limited exam as not taking deep breaths   ABD.: soft. non tender, non distended,  bowel sounds present  Extrem: intact pulses , no signif edema   PSYCH : normal mood,  not anxious      ==================>> MEDICATIONS <<====================    MEDICATIONS  (STANDING):  dextrose 40% Gel 15 Gram(s) Oral once  dextrose 5%. 1000 milliLiter(s) (50 mL/Hr) IV Continuous <Continuous>  dextrose 5%. 1000 milliLiter(s) (100 mL/Hr) IV Continuous <Continuous>  dextrose 50% Injectable 25 Gram(s) IV Push once  dextrose 50% Injectable 12.5 Gram(s) IV Push once  dextrose 50% Injectable 25 Gram(s) IV Push once  furosemide   Injectable 20 milliGRAM(s) IV Push every 12 hours  glucagon  Injectable 1 milliGRAM(s) IntraMuscular once  heparin   Injectable 5000 Unit(s) SubCutaneous every 12 hours  insulin lispro (ADMELOG) corrective regimen sliding scale   SubCutaneous three times a day before meals  metoprolol succinate ER 25 milliGRAM(s) Oral daily  milrinone Infusion 0.125 MICROgram(s)/kG/Min (2.15 mL/Hr) IV Continuous <Continuous>    ___________  Active diet:  Diet, Consistent Carbohydrate w/Evening Snack:   1000mL Fluid Restriction ([id number])  Supplement Feeding Modality:  Oral  Glucerna Shake Cans or Servings Per Day:  1       Frequency:  Daily  ___________________    ==================>> VITAL SIGNS <<==================    T(C): 36.3 (01-20-21 @ 12:39), Max: 36.9 (01-19-21 @ 19:10)  HR: 88 (01-20-21 @ 12:39) (88 - 95)  BP: 124/73 (01-20-21 @ 12:39) (122/76 - 136/85)  BP(mean): 85 (01-19-21 @ 20:35)  RR: 16 (01-20-21 @ 12:39) (16 - 20)  SpO2: 98% (01-20-21 @ 12:39) (94% - 100%)     POCT Blood Glucose.: 178 mg/dL (20 Jan 2021 17:25)  POCT Blood Glucose.: 197 mg/dL (20 Jan 2021 12:54)  POCT Blood Glucose.: 145 mg/dL (20 Jan 2021 08:58)  POCT Blood Glucose.: 148 mg/dL (19 Jan 2021 23:29)  POCT Blood Glucose.: 151 mg/dL (19 Jan 2021 19:58)    I&O's Summary    20 Jan 2021 07:01  -  20 Jan 2021 18:52  --------------------------------------------------------  IN: 240 mL / OUT: 0 mL / NET: 240 mL     ==================>> LAB AND IMAGING <<==================                        11.4   5.35  )-----------( 66       ( 19 Jan 2021 12:59 )             34.5        01-20    121<L>  |  85<L>  |  58<H>  ----------------------------<  135<H>  4.6   |  22  |  1.43<H>    Creatinine:  1.43  <<==, 1.43  <<==, 1.40  <<==, 1.31  <<==  Sodium:   121  <==, 120  <==, 120  <==, 118  <==    Ca    9.1      20 Jan 2021 07:15    TPro  7.0  /  Alb  4.1  /  TBili  1.3<H>  /  DBili  x   /  AST  115<H>  /  ALT  36  /  AlkPhos  67  01-19    PT/INR - ( 19 Jan 2021 12:59 )   PT: 13.3 sec;   INR: 1.11 ratio    PTT - ( 19 Jan 2021 12:59 )  PTT:30.0 sec               TSH:      4.73   (12-21-20)           HgA1C:     (12-21-20)      5.4    ___________________________________________________________________________________  ===============>>  A S S E S S M E N T   A N D   P L A N <<===============  ------------------------------------------------------------------------------------------    · Assessment	  89F with h/o HTN, HLD, DM2, CKD III, HFrEF w/ severely reduced LVEF (20-25%), severe MR, and severe pHTN who was brought in by family on account of  dyspnea. On exam pt with decreased breath sounds b/l and b/l LE pitting edema. Labs notable for markedly elevated bnp, hyperkalemia, hyponatremia and thrombocytopenia. She is being admitted for acute on chronic systolic heart failure.    Problem/Plan - 1:  ·  Problem: Acute on chronic systolic congestive heart failure.  Plan: - presenting with worsening dyspnea, LE edema, weight gain  - [place] [address] on admission   - TTE done 12/2020 w/EF 20-25%  - on Torsemide 10mg po daily at home  - c/w IV diuresis w/ Lasix as above  - pt on Milrinone   - cardio appreciated   - Continue Current medications otherwise and monitor.   - supportive care     Problem/Plan - 2:  ·  Problem: Type 2 diabetes mellitus with other specified complication, without long-term current use of insulin.  Plan: - on Glipizide and Tradjenta at home ; hold while inpatient  - start low Admelog sliding scale  - Carb/consistent diet  - FS qac qhs.     Problem/Plan - 3:  ·  Problem: Hyperkalemia.  Improved   - monitor BMP     Problem/Plan - 4:  ·  Problem: Hyponatremia, chronic baseline Na ~ 120   - c/w diuresis  - 1L Fluid restriction  - monitor BMP q 12  - Nephrology on board     Problem/Plan - 5:  ·  Problem: Stage 3 chronic kidney disease, unspecified whether stage 3a or 3b CKD.  Plan: - serum Cr 1.4 ( baseline)  - c/t monitor  - avoid nephrotoxic medications.   - renal on board    Problem/Plan - 6:  Problem: Prophylactic measure. Plan: - Heparin SC for DVT ppx.    --------------------------------------------  Case discussed with pt, RN, cardio.   Education given on findings and plan of care  ___________________________  H. LETITIA Diaz.  Pager: [phone]

## 2021-01-20 NOTE — PHYSICAL THERAPY INITIAL EVALUATION ADULT - DISCHARGE DISPOSITION, PT EVAL
Home with home PT for safety assessment, gait,stair negotiation, balance, & strength training and to return pt to baseline functional mobility status. However, pt's family declines post d/c PT services./home w/ home PT

## 2021-01-20 NOTE — CONSULT NOTE ADULT - PROBLEM SELECTOR RECOMMENDATION 9
will check ua , urine osmolality , urine sodium , urine uric acid , serum sodium , serum osmolality , serum uric acid , f/u with hyponatremia work up , f/u with bmp , monitor i and o , will consider tolvaptan

## 2021-01-20 NOTE — CHART NOTE - NSCHARTNOTEFT_GEN_A_CORE
RD CHF education chart note    Patient was visited by RD for CHF education. Heart failure education provided to the daughter over the phone in detail. Discussed heart failure nutrition therapy, sodium and fluid intake, importance of diet adherence, daily weights monitoring with the patient. Reinforced importance of weight gain parameters and importance of contacting MD’s about weight changes. Provided handouts on heart failure nutrition therapy, reading heart healthy nutrition labels, heart healthy shopping tips and low sodium food list. Daughter verbalized understanding demonstrated by teach back method. RD contact information left with patient for any future questioning.

## 2021-01-20 NOTE — PHYSICAL THERAPY INITIAL EVALUATION ADULT - PERTINENT HX OF CURRENT PROBLEM, REHAB EVAL
89 y/o F with h/o HTN, DM2, CKD III, HFrEF with severely reduced LVEF, severe MR, severe pHTN, brought in by family for worsening dyspnea. pt p/w hyponatremia 2/2 ?fluid overload. CHEST XRAY: b/l pleural effusion.

## 2021-01-20 NOTE — PHYSICAL THERAPY INITIAL EVALUATION ADULT - PATIENT/FAMILY AGREES WITH PLAN
Home with home PT for safety assessment, gait,stair negotiation, balance, & strength training and to return pt to baseline functional mobility status. Pt's daughter declining any kind of PT post d/c./no

## 2021-01-20 NOTE — PHYSICAL THERAPY INITIAL EVALUATION ADULT - ADDITIONAL COMMENTS
Patient lives in pvt house with daughter, grandson. Family available all times to assist pt. >10 steps to enter. Can manage on one level Patient ambulated with rolling walker and assist for short distance indoor. primarily uses w/c. Pt owns RW, bed side commode, RW, w/c @ home.

## 2021-01-21 LAB
ANION GAP SERPL CALC-SCNC: 10 MMOL/L — SIGNIFICANT CHANGE UP (ref 5–17)
ANION GAP SERPL CALC-SCNC: 13 MMOL/L — SIGNIFICANT CHANGE UP (ref 5–17)
ANION GAP SERPL CALC-SCNC: 15 MMOL/L — SIGNIFICANT CHANGE UP (ref 5–17)
BUN SERPL-MCNC: 60 MG/DL — HIGH (ref 7–23)
BUN SERPL-MCNC: 60 MG/DL — HIGH (ref 7–23)
BUN SERPL-MCNC: 62 MG/DL — HIGH (ref 7–23)
CALCIUM SERPL-MCNC: 8.9 MG/DL — SIGNIFICANT CHANGE UP (ref 8.4–10.5)
CALCIUM SERPL-MCNC: 9 MG/DL — SIGNIFICANT CHANGE UP (ref 8.4–10.5)
CALCIUM SERPL-MCNC: 9.4 MG/DL — SIGNIFICANT CHANGE UP (ref 8.4–10.5)
CHLORIDE SERPL-SCNC: 89 MMOL/L — LOW (ref 96–108)
CHLORIDE SERPL-SCNC: 91 MMOL/L — LOW (ref 96–108)
CHLORIDE SERPL-SCNC: 92 MMOL/L — LOW (ref 96–108)
CO2 SERPL-SCNC: 22 MMOL/L — SIGNIFICANT CHANGE UP (ref 22–31)
CO2 SERPL-SCNC: 25 MMOL/L — SIGNIFICANT CHANGE UP (ref 22–31)
CO2 SERPL-SCNC: 28 MMOL/L — SIGNIFICANT CHANGE UP (ref 22–31)
CREAT SERPL-MCNC: 1.29 MG/DL — SIGNIFICANT CHANGE UP (ref 0.5–1.3)
CREAT SERPL-MCNC: 1.31 MG/DL — HIGH (ref 0.5–1.3)
CREAT SERPL-MCNC: 1.41 MG/DL — HIGH (ref 0.5–1.3)
GLUCOSE BLDC GLUCOMTR-MCNC: 133 MG/DL — HIGH (ref 70–99)
GLUCOSE BLDC GLUCOMTR-MCNC: 164 MG/DL — HIGH (ref 70–99)
GLUCOSE BLDC GLUCOMTR-MCNC: 217 MG/DL — HIGH (ref 70–99)
GLUCOSE BLDC GLUCOMTR-MCNC: 234 MG/DL — HIGH (ref 70–99)
GLUCOSE SERPL-MCNC: 128 MG/DL — HIGH (ref 70–99)
GLUCOSE SERPL-MCNC: 212 MG/DL — HIGH (ref 70–99)
GLUCOSE SERPL-MCNC: 305 MG/DL — HIGH (ref 70–99)
HCT VFR BLD CALC: 32.6 % — LOW (ref 34.5–45)
HGB BLD-MCNC: 10.8 G/DL — LOW (ref 11.5–15.5)
MAGNESIUM SERPL-MCNC: 2.2 MG/DL — SIGNIFICANT CHANGE UP (ref 1.6–2.6)
MCHC RBC-ENTMCNC: 33.1 GM/DL — SIGNIFICANT CHANGE UP (ref 32–36)
MCHC RBC-ENTMCNC: 34.4 PG — HIGH (ref 27–34)
MCV RBC AUTO: 103.8 FL — HIGH (ref 80–100)
NRBC # BLD: 0 /100 WBCS — SIGNIFICANT CHANGE UP (ref 0–0)
OSMOLALITY UR: 216 MOS/KG — LOW (ref 300–900)
PLATELET # BLD AUTO: 63 K/UL — LOW (ref 150–400)
POTASSIUM SERPL-MCNC: 4.5 MMOL/L — SIGNIFICANT CHANGE UP (ref 3.5–5.3)
POTASSIUM SERPL-MCNC: 4.6 MMOL/L — SIGNIFICANT CHANGE UP (ref 3.5–5.3)
POTASSIUM SERPL-MCNC: 4.7 MMOL/L — SIGNIFICANT CHANGE UP (ref 3.5–5.3)
POTASSIUM SERPL-SCNC: 4.5 MMOL/L — SIGNIFICANT CHANGE UP (ref 3.5–5.3)
POTASSIUM SERPL-SCNC: 4.6 MMOL/L — SIGNIFICANT CHANGE UP (ref 3.5–5.3)
POTASSIUM SERPL-SCNC: 4.7 MMOL/L — SIGNIFICANT CHANGE UP (ref 3.5–5.3)
RBC # BLD: 3.14 M/UL — LOW (ref 3.8–5.2)
RBC # FLD: 17.5 % — HIGH (ref 10.3–14.5)
SODIUM SERPL-SCNC: 122 MMOL/L — LOW (ref 135–145)
SODIUM SERPL-SCNC: 126 MMOL/L — LOW (ref 135–145)
SODIUM SERPL-SCNC: 129 MMOL/L — LOW (ref 135–145)
SODIUM SERPL-SCNC: 130 MMOL/L — LOW (ref 135–145)
SODIUM UR-SCNC: <35 MMOL/L — SIGNIFICANT CHANGE UP
URATE SERPL-MCNC: 9.8 MG/DL — HIGH (ref 2.5–7)
URATE UR-MCNC: 10.4 MG/DL — SIGNIFICANT CHANGE UP
WBC # BLD: 3.77 K/UL — LOW (ref 3.8–10.5)
WBC # FLD AUTO: 3.77 K/UL — LOW (ref 3.8–10.5)

## 2021-01-21 RX ORDER — SODIUM CHLORIDE 9 MG/ML
1 INJECTION INTRAMUSCULAR; INTRAVENOUS; SUBCUTANEOUS EVERY 12 HOURS
Refills: 0 | Status: DISCONTINUED | OUTPATIENT
Start: 2021-01-21 | End: 2021-01-22

## 2021-01-21 RX ORDER — SODIUM CHLORIDE 9 MG/ML
1000 INJECTION INTRAMUSCULAR; INTRAVENOUS; SUBCUTANEOUS
Refills: 0 | Status: DISCONTINUED | OUTPATIENT
Start: 2021-01-21 | End: 2021-01-21

## 2021-01-21 RX ORDER — INSULIN LISPRO 100/ML
VIAL (ML) SUBCUTANEOUS AT BEDTIME
Refills: 0 | Status: DISCONTINUED | OUTPATIENT
Start: 2021-01-21 | End: 2021-01-25

## 2021-01-21 RX ORDER — FUROSEMIDE 40 MG
20 TABLET ORAL EVERY 8 HOURS
Refills: 0 | Status: DISCONTINUED | OUTPATIENT
Start: 2021-01-21 | End: 2021-01-21

## 2021-01-21 RX ORDER — SODIUM CHLORIDE 9 MG/ML
1 INJECTION INTRAMUSCULAR; INTRAVENOUS; SUBCUTANEOUS EVERY 8 HOURS
Refills: 0 | Status: DISCONTINUED | OUTPATIENT
Start: 2021-01-21 | End: 2021-01-21

## 2021-01-21 RX ORDER — FUROSEMIDE 40 MG
20 TABLET ORAL EVERY 12 HOURS
Refills: 0 | Status: DISCONTINUED | OUTPATIENT
Start: 2021-01-21 | End: 2021-01-24

## 2021-01-21 RX ORDER — GLUCAGON INJECTION, SOLUTION 0.5 MG/.1ML
1 INJECTION, SOLUTION SUBCUTANEOUS ONCE
Refills: 0 | Status: DISCONTINUED | OUTPATIENT
Start: 2021-01-21 | End: 2021-01-25

## 2021-01-21 RX ORDER — ISOSORBIDE MONONITRATE 60 MG/1
30 TABLET, EXTENDED RELEASE ORAL DAILY
Refills: 0 | Status: DISCONTINUED | OUTPATIENT
Start: 2021-01-21 | End: 2021-01-25

## 2021-01-21 RX ORDER — INSULIN LISPRO 100/ML
VIAL (ML) SUBCUTANEOUS
Refills: 0 | Status: DISCONTINUED | OUTPATIENT
Start: 2021-01-21 | End: 2021-01-25

## 2021-01-21 RX ORDER — HYDRALAZINE HCL 50 MG
10 TABLET ORAL THREE TIMES A DAY
Refills: 0 | Status: DISCONTINUED | OUTPATIENT
Start: 2021-01-21 | End: 2021-01-22

## 2021-01-21 RX ADMIN — HEPARIN SODIUM 5000 UNIT(S): 5000 INJECTION INTRAVENOUS; SUBCUTANEOUS at 17:41

## 2021-01-21 RX ADMIN — Medication 20 MILLIGRAM(S): at 05:21

## 2021-01-21 RX ADMIN — Medication 10 MILLIGRAM(S): at 14:17

## 2021-01-21 RX ADMIN — MILRINONE LACTATE 2.15 MICROGRAM(S)/KG/MIN: 1 INJECTION, SOLUTION INTRAVENOUS at 09:40

## 2021-01-21 RX ADMIN — Medication 20 MILLIGRAM(S): at 14:17

## 2021-01-21 RX ADMIN — Medication 2: at 13:34

## 2021-01-21 RX ADMIN — HEPARIN SODIUM 5000 UNIT(S): 5000 INJECTION INTRAVENOUS; SUBCUTANEOUS at 05:21

## 2021-01-21 RX ADMIN — ISOSORBIDE MONONITRATE 30 MILLIGRAM(S): 60 TABLET, EXTENDED RELEASE ORAL at 14:17

## 2021-01-21 RX ADMIN — Medication 4: at 18:22

## 2021-01-21 RX ADMIN — Medication 1: at 09:40

## 2021-01-21 RX ADMIN — SODIUM CHLORIDE 1 GRAM(S): 9 INJECTION INTRAMUSCULAR; INTRAVENOUS; SUBCUTANEOUS at 11:37

## 2021-01-21 RX ADMIN — SODIUM CHLORIDE 1 GRAM(S): 9 INJECTION INTRAMUSCULAR; INTRAVENOUS; SUBCUTANEOUS at 22:15

## 2021-01-21 RX ADMIN — SODIUM CHLORIDE 45 MILLILITER(S): 9 INJECTION INTRAMUSCULAR; INTRAVENOUS; SUBCUTANEOUS at 10:43

## 2021-01-21 RX ADMIN — Medication 25 MILLIGRAM(S): at 05:21

## 2021-01-21 RX ADMIN — Medication 10 MILLIGRAM(S): at 22:15

## 2021-01-21 NOTE — PROGRESS NOTE ADULT - SUBJECTIVE AND OBJECTIVE BOX
Patient is a 89y Female whom presented to the hospital with hyponatremia    PAST MEDICAL & SURGICAL HISTORY:  DM (diabetes mellitus)    CHF (congestive heart failure)    No significant past surgical history        MEDICATIONS  (STANDING):  dextrose 40% Gel 15 Gram(s) Oral once  dextrose 5%. 1000 milliLiter(s) (50 mL/Hr) IV Continuous <Continuous>  dextrose 5%. 1000 milliLiter(s) (100 mL/Hr) IV Continuous <Continuous>  dextrose 50% Injectable 25 Gram(s) IV Push once  dextrose 50% Injectable 12.5 Gram(s) IV Push once  dextrose 50% Injectable 25 Gram(s) IV Push once  furosemide   Injectable 20 milliGRAM(s) IV Push every 12 hours  glucagon  Injectable 1 milliGRAM(s) IntraMuscular once  heparin   Injectable 5000 Unit(s) SubCutaneous every 12 hours  insulin lispro (ADMELOG) corrective regimen sliding scale   SubCutaneous three times a day before meals  metoprolol succinate ER 25 milliGRAM(s) Oral daily  milrinone Infusion 0.125 MICROgram(s)/kG/Min (2.15 mL/Hr) IV Continuous <Continuous>      Allergies    No Known Allergies    Intolerances        SOCIAL HISTORY:  Denies ETOh,Smoking,     FAMILY HISTORY:  No pertinent family history in first degree relatives        REVIEW OF SYSTEMS:    unable to obtained a good review system                          10.8   3.77  )-----------( 63       ( 21 Jan 2021 07:16 )             32.6       CBC Full  -  ( 21 Jan 2021 07:16 )  WBC Count : 3.77 K/uL  RBC Count : 3.14 M/uL  Hemoglobin : 10.8 g/dL  Hematocrit : 32.6 %  Platelet Count - Automated : 63 K/uL  Mean Cell Volume : 103.8 fl  Mean Cell Hemoglobin : 34.4 pg  Mean Cell Hemoglobin Concentration : 33.1 gm/dL  Auto Neutrophil # : x  Auto Lymphocyte # : x  Auto Monocyte # : x  Auto Eosinophil # : x  Auto Basophil # : x  Auto Neutrophil % : x  Auto Lymphocyte % : x  Auto Monocyte % : x  Auto Eosinophil % : x  Auto Basophil % : x      01-21    122<L>  |  x   |  x   ----------------------------<  x   x    |  x   |  x     Ca    9.4      21 Jan 2021 07:08  Mg     2.2     01-21        CAPILLARY BLOOD GLUCOSE      POCT Blood Glucose.: 234 mg/dL (21 Jan 2021 13:01)  POCT Blood Glucose.: 164 mg/dL (21 Jan 2021 08:59)  POCT Blood Glucose.: 167 mg/dL (20 Jan 2021 21:55)  POCT Blood Glucose.: 178 mg/dL (20 Jan 2021 17:25)      Vital Signs Last 24 Hrs  T(C): 36.4 (21 Jan 2021 12:39), Max: 36.4 (20 Jan 2021 21:01)  T(F): 97.5 (21 Jan 2021 12:39), Max: 97.5 (20 Jan 2021 21:01)  HR: 98 (21 Jan 2021 12:39) (87 - 98)  BP: 122/77 (21 Jan 2021 12:39) (114/63 - 145/80)  BP(mean): --  RR: 20 (21 Jan 2021 12:39) (18 - 20)  SpO2: 100% (21 Jan 2021 12:39) (98% - 100%)          PHYSICAL EXAM:    Constitutional: NAD  HEENT: conjunctive   clear   Neck:  No JVD  Respiratory: decrease bs b/l   Cardiovascular: S1 and S2  Gastrointestinal: BS+, soft,   Extremities: pos  peripheral edema    LABS:                        11.4   5.35  )-----------( 66       ( 19 Jan 2021 12:59 )             34.5     01-20    121<L>  |  85<L>  |  58<H>  ----------------------------<  135<H>  4.6   |  22  |  1.43<H>    Ca    9.1      20 Jan 2021 07:15    TPro  7.0  /  Alb  4.1  /  TBili  1.3<H>  /  DBili  x   /  AST  115<H>  /  ALT  36  /  AlkPhos  67  01-19      Urine Studies:          RADIOLOGY & ADDITIONAL STUDIES:

## 2021-01-21 NOTE — PROVIDER CONTACT NOTE (OTHER) - ASSESSMENT
Patient A&O x 3, denies chest pain, bowel sounds positive. VS: Temp 97.5, HR 87, /74, RR 18 , O2 Sat 1900%

## 2021-01-21 NOTE — PROGRESS NOTE ADULT - SUBJECTIVE AND OBJECTIVE BOX
CARDIOLOGY     PROGRESS  NOTE   ________________________________________________    CHIEF COMPLAINT:Patient is a 89y old  Female who presents with a chief complaint of shortness of breath (20 Jan 2021 18:52)  no complain.  	  REVIEW OF SYSTEMS:  CONSTITUTIONAL: No fever, weight loss, or fatigue  EYES: No eye pain, visual disturbances, or discharge  ENT:  No difficulty hearing, tinnitus, vertigo; No sinus or throat pain  NECK: No pain or stiffness  RESPIRATORY: No cough, wheezing, chills or hemoptysis; No Shortness of Breath  CARDIOVASCULAR: No chest pain, palpitations, passing out, dizziness, or leg swelling  GASTROINTESTINAL: No abdominal or epigastric pain. No nausea, vomiting, or hematemesis; No diarrhea or constipation. No melena or hematochezia.  GENITOURINARY: No dysuria, frequency, hematuria, or incontinence  NEUROLOGICAL: No headaches, memory loss, loss of strength, numbness, or tremors  SKIN: No itching, burning, rashes, or lesions   LYMPH Nodes: No enlarged glands  ENDOCRINE: No heat or cold intolerance; No hair loss  MUSCULOSKELETAL: No joint pain or swelling; No muscle, back, or extremity pain  PSYCHIATRIC: No depression, anxiety, mood swings, or difficulty sleeping  HEME/LYMPH: No easy bruising, or bleeding gums  ALLERGY AND IMMUNOLOGIC: No hives or eczema	    [ ] All others negative	  [x ] Unable to obtain    PHYSICAL EXAM:  T(C): 36.3 (01-21-21 @ 04:12), Max: 36.4 (01-20-21 @ 21:01)  HR: 96 (01-21-21 @ 05:19) (87 - 96)  BP: 145/80 (01-21-21 @ 05:19) (114/63 - 145/80)  RR: 18 (01-21-21 @ 05:19) (16 - 18)  SpO2: 98% (01-21-21 @ 05:19) (98% - 100%)  Wt(kg): --  I&O's Summary    20 Jan 2021 07:01  -  21 Jan 2021 07:00  --------------------------------------------------------  IN: 521.4 mL / OUT: 300 mL / NET: 221.4 mL        Appearance: Normal	  HEENT:   Normal oral mucosa, PERRL, EOMI	  Lymphatic: No lymphadenopathy  Cardiovascular: Normal S1 S2, No JVD, + murmurs, No edema  Respiratory: Lungs clear to auscultation	  Psychiatry: A & O x 3, Mood & affect appropriate  Gastrointestinal:  Soft, Non-tender, + BS	  Skin: No rashes, No ecchymoses, No cyanosis	  Neurologic: Non-focal  Extremities: Normal range of motion, No clubbing, cyanosis or edema  Vascular: Peripheral pulses palpable 2+ bilaterally    MEDICATIONS  (STANDING):  dextrose 40% Gel 15 Gram(s) Oral once  dextrose 5%. 1000 milliLiter(s) (50 mL/Hr) IV Continuous <Continuous>  dextrose 5%. 1000 milliLiter(s) (100 mL/Hr) IV Continuous <Continuous>  dextrose 50% Injectable 25 Gram(s) IV Push once  dextrose 50% Injectable 12.5 Gram(s) IV Push once  dextrose 50% Injectable 25 Gram(s) IV Push once  furosemide   Injectable 20 milliGRAM(s) IV Push every 12 hours  glucagon  Injectable 1 milliGRAM(s) IntraMuscular once  heparin   Injectable 5000 Unit(s) SubCutaneous every 12 hours  insulin lispro (ADMELOG) corrective regimen sliding scale   SubCutaneous three times a day before meals  metoprolol succinate ER 25 milliGRAM(s) Oral daily  milrinone Infusion 0.125 MICROgram(s)/kG/Min (2.15 mL/Hr) IV Continuous <Continuous>      TELEMETRY: 	    ECG:  	  RADIOLOGY:  OTHER: 	  	  LABS:	 	    CARDIAC MARKERS:                                11.4   5.35  )-----------( 66       ( 19 Jan 2021 12:59 )             34.5     01-20    123<L>  |  87<L>  |  61<H>  ----------------------------<  237<H>  4.8   |  24  |  1.31<H>    Ca    8.8      20 Jan 2021 20:20    TPro  7.0  /  Alb  4.1  /  TBili  1.3<H>  /  DBili  x   /  AST  115<H>  /  ALT  36  /  AlkPhos  67  01-19    proBNP: Serum Pro-Brain Natriuretic Peptide: 75540 pg/mL (01-19 @ 12:59)    Lipid Profile:   HgA1c:   TSH:   PT/INR - ( 19 Jan 2021 12:59 )   PT: 13.3 sec;   INR: 1.11 ratio         PTT - ( 19 Jan 2021 12:59 )  PTT:30.0 sec    Problem: Hyponatremia. Recommendation: will check ua , urine osmolality , urine sodium , urine uric acid , serum sodium , serum osmolality , serum uric acid , f/u with hyponatremia work up , f/u with bmp , monitor i and o , will consider tolvaptan.  Assessment and plan  ---------------------------  89F with h/o HTN, HLD, DM2, CKD III, HFrEF w/ severely reduced LVEF(20-25%),  severe MR, and severe pHTN who was brought in by family on account of worsening dyspnea. Per daughter pt is usually short of breath at baseline but this morning was noted to be worse and appeared to be in respiratory distress. Pt also c/o chest pressure. She also reports worsening LE swelling, recent weight gain, abd distension and extreme fatigue. She  denies fever/chills, n/v, cough.  pt with hx of chf/cardiomyopathy with hyponatremia and sob  pt with hyponatremia ?sec to fluid overload  iv lasix  renal eval will  l adjust cardiac meds  tsh  will review old echo  dvt prophylaxis  will consider pos inotrope ??  fu lytes closely  started on milrinone drip, fu i/o  fu sodium levell pending from today  hr was well controlled over night  continue diuresis  will add hydralazine and nitrate

## 2021-01-21 NOTE — PROGRESS NOTE ADULT - ASSESSMENT
_________________________________________________________________________________________  ========>>  M E D I C A L   A T T E N D I N G    F O L L O W  U P  N O T E  <<=========  -----------------------------------------------------------------------------------------------------    - Patient seen and examined by me earlier today.   - In summary,  JEANNA LUNA is a 89y year old woman admitted with SOB  - Patient today overall doing ok, comfortable, eating well     ==================>> REVIEW OF SYSTEM <<=================    limited ROS  GEN: no fever, no chills, no pain  RESP: no SOB, no cough  CVS: no chest pain, no palpitations  GI: no abdominal pain, no nausea  : no dysuria  Neuro: no headache  Derm : no itching    ==================>> PHYSICAL EXAM <<=================    GEN: A&O X 2 , NAD , comfortable  HEENT: NCAT, PERRL, MMM, hearing intact  Neck: supple , no JVD appreciated  CVS: S1S2 , regular , No M/R/G appreciated  PULM: CTA B/L,  limited exam as not taking deep breaths   ABD.: soft. non tender, non distended,  bowel sounds present  Extrem: intact pulses , no signif edema   PSYCH : normal mood,  not anxious       ==================>> MEDICATIONS <<====================    dextrose 40% Gel 15 Gram(s) Oral once  dextrose 5%. 1000 milliLiter(s) IV Continuous <Continuous>  dextrose 5%. 1000 milliLiter(s) IV Continuous <Continuous>  dextrose 50% Injectable 25 Gram(s) IV Push once  dextrose 50% Injectable 12.5 Gram(s) IV Push once  dextrose 50% Injectable 25 Gram(s) IV Push once  furosemide   Injectable 20 milliGRAM(s) IV Push every 8 hours  glucagon  Injectable 1 milliGRAM(s) IntraMuscular once  heparin   Injectable 5000 Unit(s) SubCutaneous every 12 hours  hydrALAZINE 10 milliGRAM(s) Oral three times a day  insulin lispro (ADMELOG) corrective regimen sliding scale   SubCutaneous three times a day before meals  isosorbide   mononitrate ER Tablet (IMDUR) 30 milliGRAM(s) Oral daily  metoprolol succinate ER 25 milliGRAM(s) Oral daily  milrinone Infusion 0.125 MICROgram(s)/kG/Min IV Continuous <Continuous>  sodium chloride 1 Gram(s) Oral every 8 hours  sodium chloride 0.9%. 1000 milliLiter(s) IV Continuous <Continuous>      ___________  Active diet:  Diet, Consistent Carbohydrate w/Evening Snack:   1000mL Fluid Restriction ([id number])  Supplement Feeding Modality:  Oral  Glucerna Shake Cans or Servings Per Day:  1       Frequency:  Daily  ___________________    ==================>> VITAL SIGNS <<==================    Height (cm): 157.5  Weight (kg): 57.3  BMI (kg/m2): 23.1  Vital Signs Last 24 HrsT(C): 36.4 (01-21-21 @ 12:39)  T(F): 97.5 (01-21-21 @ 12:39), Max: 97.5 (01-20-21 @ 21:01)  HR: 98 (01-21-21 @ 12:39) (87 - 98)  BP: 122/77 (01-21-21 @ 12:39)  RR: 20 (01-21-21 @ 12:39) (18 - 20)  SpO2: 100% (01-21-21 @ 12:39) (98% - 100%)      POCT Blood Glucose.: 234 mg/dL (21 Jan 2021 13:01)  POCT Blood Glucose.: 164 mg/dL (21 Jan 2021 08:59)  POCT Blood Glucose.: 167 mg/dL (20 Jan 2021 21:55)  POCT Blood Glucose.: 178 mg/dL (20 Jan 2021 17:25)     ==================>> LAB AND IMAGING <<==================                        10.8   3.77  )-----------( 63       ( 21 Jan 2021 07:16 )             32.6        01-21    122<L>  |  x   |  x   ----------------------------<  x   x    |  x   |  x     Ca    9.4      21 Jan 2021 07:08  Mg     2.2     01-21      WBC count:   3.77 <<== ,  5.35 <<==   Hemoglobin:   10.8 <<==,  11.4 <<==  platelets:  63 <==, 66 <==    Creatinine:  1.41  <<==, 1.31  <<==, 1.43  <<==, 1.43  <<==, 1.40  <<==, 1.31  <<==  Sodium:   122  <==, 126  <==, 123  <==, 121  <==, 120  <==, 120  <==, 118  <==    ___________________________________________________________________________________  ===============>>  A S S E S S M E N T   A N D   P L A N <<===============  ------------------------------------------------------------------------------------------    · Assessment	  89F with h/o HTN, HLD, DM2, CKD III, HFrEF w/ severely reduced LVEF (20-25%), severe MR, and severe pHTN who was brought in by family on account of  dyspnea. On exam pt with decreased breath sounds b/l and b/l LE pitting edema. Labs notable for markedly elevated bnp, hyperkalemia, hyponatremia and thrombocytopenia. She is being admitted for acute on chronic systolic heart failure.    Problem/Plan - 1:  ·  Problem: Acute on chronic systolic congestive heart failure.     - presenting with worsening dyspnea, LE edema, weight gain  - b[place] [address] on admission   - TTE done 12/2020 w/EF 20-25%  - c/w IV diuresis w/ Lasix as above  - pt on Milrinone   - cardio appreciated   - med adjustment and adjustment as needed  - Continue Current medications otherwise and monitor.   - supportive care     Problem/Plan - 2:  ·  Problem: Type 2 diabetes mellitus with other specified complication, without long-term current use of insulin.    - on Glipizide and Tradjenta at home ; hold while inpatient  - sliding scale  - Carb/consistent diet  - FS qac qhs.     Problem/Plan - 3:  ·  Problem: Hyperkalemia.  Improved   - monitor BMP     Problem/Plan - 4:  ·  Problem: Hyponatremia, chronic baseline Na ~ 120   - c/w diuresis  - 1L Fluid restriction  - monitor BMP q 12  - Nephrology on board     Problem/Plan - 5:  ·  Problem: Stage 3 chronic kidney disease, unspecified whether stage 3a or 3b CKD.  Plan: - serum Cr 1.4 ( baseline)  - c/t monitor  - avoid nephrotoxic medications.   - renal on board    Problem/Plan - 6:  Problem: Prophylactic measure. Plan: - Heparin SC for DVT ppx.    --------------------------------------------  Case discussed with pt  Education given on findings and plan of care  ___________________________  H. LETITIA Diaz.  Pager: [phone]

## 2021-01-21 NOTE — PROGRESS NOTE ADULT - ASSESSMENT
89F with h/o HTN, HLD, DM2, CKD III, HFrEF w/ severely reduced LVEF(20-25%),  severe MR, and severe pHTN who was brought in by family on account of worsening dyspnea. Per daughter pt is usually short of breath at baseline but this morning was noted to be worse and appeared to be in respiratory distress. Pt also c/o chest pressure. She also reports worsening LE swelling, recent weight gain, abd distension and extreme fatigue. She  denies fever/chills, n/v, cough.

## 2021-01-22 LAB
ANION GAP SERPL CALC-SCNC: 11 MMOL/L — SIGNIFICANT CHANGE UP (ref 5–17)
BUN SERPL-MCNC: 54 MG/DL — HIGH (ref 7–23)
CALCIUM SERPL-MCNC: 8.5 MG/DL — SIGNIFICANT CHANGE UP (ref 8.4–10.5)
CHLORIDE SERPL-SCNC: 95 MMOL/L — LOW (ref 96–108)
CO2 SERPL-SCNC: 25 MMOL/L — SIGNIFICANT CHANGE UP (ref 22–31)
CREAT SERPL-MCNC: 1.25 MG/DL — SIGNIFICANT CHANGE UP (ref 0.5–1.3)
GLUCOSE BLDC GLUCOMTR-MCNC: 153 MG/DL — HIGH (ref 70–99)
GLUCOSE BLDC GLUCOMTR-MCNC: 165 MG/DL — HIGH (ref 70–99)
GLUCOSE BLDC GLUCOMTR-MCNC: 188 MG/DL — HIGH (ref 70–99)
GLUCOSE BLDC GLUCOMTR-MCNC: 202 MG/DL — HIGH (ref 70–99)
GLUCOSE SERPL-MCNC: 160 MG/DL — HIGH (ref 70–99)
HCT VFR BLD CALC: 28.5 % — LOW (ref 34.5–45)
HGB BLD-MCNC: 9.4 G/DL — LOW (ref 11.5–15.5)
MAGNESIUM SERPL-MCNC: 2 MG/DL — SIGNIFICANT CHANGE UP (ref 1.6–2.6)
MCHC RBC-ENTMCNC: 33 GM/DL — SIGNIFICANT CHANGE UP (ref 32–36)
MCHC RBC-ENTMCNC: 34.3 PG — HIGH (ref 27–34)
MCV RBC AUTO: 104 FL — HIGH (ref 80–100)
NRBC # BLD: 0 /100 WBCS — SIGNIFICANT CHANGE UP (ref 0–0)
PLATELET # BLD AUTO: 70 K/UL — LOW (ref 150–400)
POTASSIUM SERPL-MCNC: 4.2 MMOL/L — SIGNIFICANT CHANGE UP (ref 3.5–5.3)
POTASSIUM SERPL-SCNC: 4.2 MMOL/L — SIGNIFICANT CHANGE UP (ref 3.5–5.3)
RBC # BLD: 2.74 M/UL — LOW (ref 3.8–5.2)
RBC # FLD: 17.6 % — HIGH (ref 10.3–14.5)
SODIUM SERPL-SCNC: 131 MMOL/L — LOW (ref 135–145)
WBC # BLD: 3.72 K/UL — LOW (ref 3.8–10.5)
WBC # FLD AUTO: 3.72 K/UL — LOW (ref 3.8–10.5)

## 2021-01-22 RX ORDER — HYDRALAZINE HCL 50 MG
25 TABLET ORAL
Refills: 0 | Status: DISCONTINUED | OUTPATIENT
Start: 2021-01-22 | End: 2021-01-25

## 2021-01-22 RX ORDER — SODIUM CHLORIDE 9 MG/ML
1 INJECTION INTRAMUSCULAR; INTRAVENOUS; SUBCUTANEOUS DAILY
Refills: 0 | Status: DISCONTINUED | OUTPATIENT
Start: 2021-01-22 | End: 2021-01-24

## 2021-01-22 RX ADMIN — SODIUM CHLORIDE 1 GRAM(S): 9 INJECTION INTRAMUSCULAR; INTRAVENOUS; SUBCUTANEOUS at 09:22

## 2021-01-22 RX ADMIN — Medication 4: at 13:10

## 2021-01-22 RX ADMIN — Medication 25 MILLIGRAM(S): at 05:13

## 2021-01-22 RX ADMIN — HEPARIN SODIUM 5000 UNIT(S): 5000 INJECTION INTRAVENOUS; SUBCUTANEOUS at 05:12

## 2021-01-22 RX ADMIN — ISOSORBIDE MONONITRATE 30 MILLIGRAM(S): 60 TABLET, EXTENDED RELEASE ORAL at 09:22

## 2021-01-22 RX ADMIN — MILRINONE LACTATE 2.15 MICROGRAM(S)/KG/MIN: 1 INJECTION, SOLUTION INTRAVENOUS at 17:43

## 2021-01-22 RX ADMIN — Medication 20 MILLIGRAM(S): at 05:12

## 2021-01-22 RX ADMIN — MILRINONE LACTATE 2.15 MICROGRAM(S)/KG/MIN: 1 INJECTION, SOLUTION INTRAVENOUS at 22:50

## 2021-01-22 RX ADMIN — Medication 10 MILLIGRAM(S): at 05:12

## 2021-01-22 RX ADMIN — Medication 2: at 08:49

## 2021-01-22 RX ADMIN — Medication 2: at 18:19

## 2021-01-22 RX ADMIN — Medication 20 MILLIGRAM(S): at 17:42

## 2021-01-22 RX ADMIN — HEPARIN SODIUM 5000 UNIT(S): 5000 INJECTION INTRAVENOUS; SUBCUTANEOUS at 17:42

## 2021-01-22 RX ADMIN — Medication 25 MILLIGRAM(S): at 17:42

## 2021-01-22 NOTE — PROGRESS NOTE ADULT - ASSESSMENT
_________________________________________________________________________________________  ========>>  M E D I C A L   A T T E N D I N G    F O L L O W  U P  N O T E  <<=========  -----------------------------------------------------------------------------------------------------    - Patient seen and examined by me earlier today.   - In summary,  JEANNA LUNA is a 89y year old woman admitted with SOB  - Patient today overall doing ok, comfortable, eating well     ==================>> REVIEW OF SYSTEM <<=================    limited ROS  GEN: no fever, no chills, no pain  RESP: no SOB, no cough  CVS: no chest pain, no palpitations  GI: no abdominal pain, no nausea  : no dysuria  Neuro: no headache  Derm : no itching    ==================>> PHYSICAL EXAM <<=================    GEN: A&O X 2 , NAD , comfortable  HEENT: NCAT, PERRL, MMM, hearing intact  Neck: supple , no JVD appreciated  CVS: S1S2 , regular , No M/R/G appreciated  PULM: limited exam as not taking deep breaths , + mild rhonchi R>Lt  ABD.: soft. non tender, non distended,  bowel sounds present  Extrem: intact pulses , no   ==================>> MEDICATIONS <<====================    dextrose 40% Gel 15 Gram(s) Oral once  dextrose 5%. 1000 milliLiter(s) IV Continuous <Continuous>  dextrose 5%. 1000 milliLiter(s) IV Continuous <Continuous>  dextrose 50% Injectable 25 Gram(s) IV Push once  dextrose 50% Injectable 12.5 Gram(s) IV Push once  dextrose 50% Injectable 25 Gram(s) IV Push once  furosemide   Injectable 20 milliGRAM(s) IV Push every 12 hours  glucagon  Injectable 1 milliGRAM(s) IntraMuscular once  glucagon  Injectable 1 milliGRAM(s) IntraMuscular once  heparin   Injectable 5000 Unit(s) SubCutaneous every 12 hours  hydrALAZINE 25 milliGRAM(s) Oral two times a day  insulin lispro (ADMELOG) corrective regimen sliding scale   SubCutaneous three times a day before meals  insulin lispro (ADMELOG) corrective regimen sliding scale   SubCutaneous at bedtime  isosorbide   mononitrate ER Tablet (IMDUR) 30 milliGRAM(s) Oral daily  metoprolol succinate ER 25 milliGRAM(s) Oral daily  milrinone Infusion 0.125 MICROgram(s)/kG/Min IV Continuous <Continuous>  sodium chloride 1 Gram(s) Oral daily    ___________  Active diet:  Diet, Consistent Carbohydrate w/Evening Snack:   1000mL Fluid Restriction ([id number])  Supplement Feeding Modality:  Oral  Glucerna Shake Cans or Servings Per Day:  1       Frequency:  Daily  ___________________    ==================>> VITAL SIGNS <<==================  Height (cm): 157.5  Weight (kg): 57.3  BMI (kg/m2): 23.1  Vital Signs Last 24 HrsT(C): 36.8 (01-22-21 @ 11:28)  T(F): 98.2 (01-22-21 @ 11:28), Max: 98.4 (01-21-21 @ 20:17)  HR: 91 (01-22-21 @ 11:28) (89 - 98)  BP: 119/72 (01-22-21 @ 11:28)  RR: 18 (01-22-21 @ 11:28) (18 - 20)  SpO2: 100% (01-22-21 @ 11:28) (99% - 100%)      POCT Blood Glucose.: 153 mg/dL (22 Jan 2021 08:28)  POCT Blood Glucose.: 133 mg/dL (21 Jan 2021 22:25)  POCT Blood Glucose.: 217 mg/dL (21 Jan 2021 18:19)  POCT Blood Glucose.: 234 mg/dL (21 Jan 2021 13:01)     ==================>> LAB AND IMAGING <<==================                        9.4    3.72  )-----------( 70       ( 22 Jan 2021 06:58 )             28.5        01-22    131<L>  |  95<L>  |  54<H>  ----------------------------<  160<H>  4.2   |  25  |  1.25    Ca    8.5      22 Jan 2021 06:58  Mg     2.0     01-22    WBC count:   3.72 <<== ,  3.77 <<== ,  5.35 <<==   Hemoglobin:   9.4 <<==,  10.8 <<==,  11.4 <<==  platelets:  70 <==, 63 <==, 66 <==    Creatinine:  1.25  <<==, 1.29  <<==, 1.31  <<==, 1.41  <<==, 1.31  <<==, 1.43  <<==  Sodium:   131  <==, 130  <==, 129  <==, 122  <==, 126  <==, 123  <==, 121  <==    ___________________________________________________________________________________  ===============>>  A S S E S S M E N T   A N D   P L A N <<===============  ------------------------------------------------------------------------------------------    · Assessment	  89F with h/o HTN, HLD, DM2, CKD III, HFrEF w/ severely reduced LVEF (20-25%), severe MR, and severe pHTN who was brought in by family on account of  dyspnea. On exam pt with decreased breath sounds b/l and b/l LE pitting edema. Labs notable for markedly elevated bnp, hyperkalemia, hyponatremia and thrombocytopenia. She is being admitted for acute on chronic systolic heart failure.    Problem/Plan - 1:  ·  Problem: Acute on chronic systolic congestive heart failure.     - presenting with worsening dyspnea, LE edema, weight gain  - bnp [place] elevated at [address] on admission   - TTE done 12/2020 w/EF 20-25%  - c/w IV diuresis w/ Lasix as above  - pt on Milrinone   - cardio appreciated   - med adjustment and adjustment as needed  - Continue Current medications otherwise and monitor.   - supportive care   - OOB to chair as able, IS    Problem/Plan - 2:  ·  Problem: Type 2 diabetes mellitus with other specified complication, without long-term current use of insulin.    - on Glipizide and Tradjenta at home ; hold while inpatient  - sliding scale  - Carb/consistent diet  - FS qa qhs.     Problem/Plan - 3:  ·  Problem: Hyperkalemia.  Improved   - monitor BMP     Problem/Plan - 4:  ·  Problem: Hyponatremia, chronic baseline Na ~ 120   - c/w diuresis  - 1L Fluid restriction  - monitor BMP  as improved       Sodium:   131  <==, 130  <==, 129  <==, 122  <==, 126  <==, 123  <==, 121  <==  - Nephrology on board     Problem/Plan - 5:  ·  Problem: Stage 3 chronic kidney disease, unspecified whether stage 3a or 3b CKD.  Plan: - serum Cr 1.4 ( baseline)  - c/t monitor  - avoid nephrotoxic medications.   - renal on board    Problem/Plan - 6:  Problem: Prophylactic measure. Plan: - Heparin SC for DVT ppx.      ___________________________  H. LETITIA Diaz.  Pager: [phone]

## 2021-01-22 NOTE — PROGRESS NOTE ADULT - SUBJECTIVE AND OBJECTIVE BOX
Patient is a 89y Female whom presented to the hospital with hyponatremia    PAST MEDICAL & SURGICAL HISTORY:  DM (diabetes mellitus)    CHF (congestive heart failure)    No significant past surgical history        MEDICATIONS  (STANDING):  dextrose 40% Gel 15 Gram(s) Oral once  dextrose 5%. 1000 milliLiter(s) (50 mL/Hr) IV Continuous <Continuous>  dextrose 5%. 1000 milliLiter(s) (100 mL/Hr) IV Continuous <Continuous>  dextrose 50% Injectable 25 Gram(s) IV Push once  dextrose 50% Injectable 12.5 Gram(s) IV Push once  dextrose 50% Injectable 25 Gram(s) IV Push once  furosemide   Injectable 20 milliGRAM(s) IV Push every 12 hours  glucagon  Injectable 1 milliGRAM(s) IntraMuscular once  heparin   Injectable 5000 Unit(s) SubCutaneous every 12 hours  insulin lispro (ADMELOG) corrective regimen sliding scale   SubCutaneous three times a day before meals  metoprolol succinate ER 25 milliGRAM(s) Oral daily  milrinone Infusion 0.125 MICROgram(s)/kG/Min (2.15 mL/Hr) IV Continuous <Continuous>      Allergies    No Known Allergies    Intolerances        SOCIAL HISTORY:  Denies ETOh,Smoking,     FAMILY HISTORY:  No pertinent family history in first degree relatives        REVIEW OF SYSTEMS:    unable to obtained a good review system                          9.4    3.72  )-----------( 70       ( 22 Jan 2021 06:58 )             28.5       CBC Full  -  ( 22 Jan 2021 06:58 )  WBC Count : 3.72 K/uL  RBC Count : 2.74 M/uL  Hemoglobin : 9.4 g/dL  Hematocrit : 28.5 %  Platelet Count - Automated : 70 K/uL  Mean Cell Volume : 104.0 fl  Mean Cell Hemoglobin : 34.3 pg  Mean Cell Hemoglobin Concentration : 33.0 gm/dL  Auto Neutrophil # : x  Auto Lymphocyte # : x  Auto Monocyte # : x  Auto Eosinophil # : x  Auto Basophil # : x  Auto Neutrophil % : x  Auto Lymphocyte % : x  Auto Monocyte % : x  Auto Eosinophil % : x  Auto Basophil % : x      01-22    131<L>  |  95<L>  |  54<H>  ----------------------------<  160<H>  4.2   |  25  |  1.25    Ca    8.5      22 Jan 2021 06:58  Mg     2.0     01-22        CAPILLARY BLOOD GLUCOSE      POCT Blood Glucose.: 202 mg/dL (22 Jan 2021 12:26)  POCT Blood Glucose.: 153 mg/dL (22 Jan 2021 08:28)  POCT Blood Glucose.: 133 mg/dL (21 Jan 2021 22:25)  POCT Blood Glucose.: 217 mg/dL (21 Jan 2021 18:19)      Vital Signs Last 24 Hrs  T(C): 36.8 (22 Jan 2021 11:28), Max: 36.9 (21 Jan 2021 20:17)  T(F): 98.2 (22 Jan 2021 11:28), Max: 98.4 (21 Jan 2021 20:17)  HR: 91 (22 Jan 2021 11:28) (89 - 96)  BP: 119/72 (22 Jan 2021 11:28) (119/72 - 131/72)  BP(mean): --  RR: 18 (22 Jan 2021 11:28) (18 - 20)  SpO2: 100% (22 Jan 2021 11:28) (99% - 100%)            PHYSICAL EXAM:    Constitutional: NAD  HEENT: conjunctive   clear   Neck:  No JVD  Respiratory: decrease bs b/l   Cardiovascular: S1 and S2  Gastrointestinal: BS+, soft,   Extremities: pos  peripheral edema

## 2021-01-22 NOTE — PROGRESS NOTE ADULT - SUBJECTIVE AND OBJECTIVE BOX
CARDIOLOGY     PROGRESS  NOTE   ________________________________________________    CHIEF COMPLAINT:Patient is a 89y old  Female who presents with a chief complaint of shortness of breath (21 Jan 2021 15:01)  no complain.  	  REVIEW OF SYSTEMS:  CONSTITUTIONAL: No fever, weight loss, or fatigue  EYES: No eye pain, visual disturbances, or discharge  ENT:  No difficulty hearing, tinnitus, vertigo; No sinus or throat pain  NECK: No pain or stiffness  RESPIRATORY: No cough, wheezing, chills or hemoptysis; No Shortness of Breath  CARDIOVASCULAR: No chest pain, palpitations, passing out, dizziness, or leg swelling  GASTROINTESTINAL: No abdominal or epigastric pain. No nausea, vomiting, or hematemesis; No diarrhea or constipation. No melena or hematochezia.  GENITOURINARY: No dysuria, frequency, hematuria, or incontinence  NEUROLOGICAL: No headaches, memory loss, loss of strength, numbness, or tremors  SKIN: No itching, burning, rashes, or lesions   LYMPH Nodes: No enlarged glands  ENDOCRINE: No heat or cold intolerance; No hair loss  MUSCULOSKELETAL: No joint pain or swelling; No muscle, back, or extremity pain  PSYCHIATRIC: No depression, anxiety, mood swings, or difficulty sleeping  HEME/LYMPH: No easy bruising, or bleeding gums  ALLERGY AND IMMUNOLOGIC: No hives or eczema	    [ ] All others negative	  [x ] Unable to obtain    PHYSICAL EXAM:  T(C): 36.8 (01-22-21 @ 04:04), Max: 36.9 (01-21-21 @ 20:17)  HR: 96 (01-22-21 @ 05:10) (89 - 98)  BP: 131/72 (01-22-21 @ 05:10) (121/70 - 131/72)  RR: 18 (01-22-21 @ 05:10) (18 - 20)  SpO2: 99% (01-22-21 @ 05:10) (99% - 100%)  Wt(kg): --  I&O's Summary    21 Jan 2021 07:01  -  22 Jan 2021 07:00  --------------------------------------------------------  IN: 771.6 mL / OUT: 1300 mL / NET: -528.4 mL        Appearance: Normal	  HEENT:   Normal oral mucosa, PERRL, EOMI	  Lymphatic: No lymphadenopathy  Cardiovascular: Normal S1 S2, No JVD, + murmurs, No edema  Respiratory: Lungs clear to auscultation	  Psychiatry: A & O x 3, Mood & affect appropriate  Gastrointestinal:  Soft, Non-tender, + BS	  Skin: No rashes, No ecchymoses, No cyanosis	  Neurologic: Non-focal  Extremities: Normal range of motion, No clubbing, cyanosis or edema  Vascular: Peripheral pulses palpable 2+ bilaterally    MEDICATIONS  (STANDING):  dextrose 40% Gel 15 Gram(s) Oral once  dextrose 5%. 1000 milliLiter(s) (50 mL/Hr) IV Continuous <Continuous>  dextrose 5%. 1000 milliLiter(s) (100 mL/Hr) IV Continuous <Continuous>  dextrose 50% Injectable 25 Gram(s) IV Push once  dextrose 50% Injectable 12.5 Gram(s) IV Push once  dextrose 50% Injectable 25 Gram(s) IV Push once  furosemide   Injectable 20 milliGRAM(s) IV Push every 12 hours  glucagon  Injectable 1 milliGRAM(s) IntraMuscular once  glucagon  Injectable 1 milliGRAM(s) IntraMuscular once  heparin   Injectable 5000 Unit(s) SubCutaneous every 12 hours  hydrALAZINE 10 milliGRAM(s) Oral three times a day  insulin lispro (ADMELOG) corrective regimen sliding scale   SubCutaneous three times a day before meals  insulin lispro (ADMELOG) corrective regimen sliding scale   SubCutaneous at bedtime  isosorbide   mononitrate ER Tablet (IMDUR) 30 milliGRAM(s) Oral daily  metoprolol succinate ER 25 milliGRAM(s) Oral daily  milrinone Infusion 0.125 MICROgram(s)/kG/Min (2.15 mL/Hr) IV Continuous <Continuous>  sodium chloride 1 Gram(s) Oral every 12 hours      TELEMETRY: 	    ECG:  	  RADIOLOGY:  OTHER: 	  	  LABS:	 	    CARDIAC MARKERS:                                9.4    3.72  )-----------( 70       ( 22 Jan 2021 06:58 )             28.5     01-22    131<L>  |  95<L>  |  54<H>  ----------------------------<  160<H>  4.2   |  25  |  1.25    Ca    8.5      22 Jan 2021 06:58  Mg     2.0     01-22      proBNP: Serum Pro-Brain Natriuretic Peptide: 54312 pg/mL (01-19 @ 12:59)    Lipid Profile:   HgA1c:   TSH:         Assessment and plan  ---------------------------  89F with h/o HTN, HLD, DM2, CKD III, HFrEF w/ severely reduced LVEF(20-25%),  severe MR, and severe pHTN who was brought in by family on account of worsening dyspnea. Per daughter pt is usually short of breath at baseline but this morning was noted to be worse and appeared to be in respiratory distress. Pt also c/o chest pressure. She also reports worsening LE swelling, recent weight gain, abd distension and extreme fatigue. She  denies fever/chills, n/v, cough.  pt with hx of chf/cardiomyopathy with hyponatremia and sob  pt with hyponatremia ?sec to fluid overload, improving  iv lasix  tsh  will review old echo  dvt prophylaxis  will consider pos inotrope ??  fu lytes closely  continue  on milrinone drip, fu i/o  fu sodium levell pending from today  hr was well controlled over night  continue diuresis  will add hydralazine and nitrate, increase as tolerated

## 2021-01-23 LAB
ANION GAP SERPL CALC-SCNC: 11 MMOL/L — SIGNIFICANT CHANGE UP (ref 5–17)
BUN SERPL-MCNC: 48 MG/DL — HIGH (ref 7–23)
CALCIUM SERPL-MCNC: 9.1 MG/DL — SIGNIFICANT CHANGE UP (ref 8.4–10.5)
CHLORIDE SERPL-SCNC: 95 MMOL/L — LOW (ref 96–108)
CO2 SERPL-SCNC: 25 MMOL/L — SIGNIFICANT CHANGE UP (ref 22–31)
CREAT SERPL-MCNC: 1.22 MG/DL — SIGNIFICANT CHANGE UP (ref 0.5–1.3)
GLUCOSE BLDC GLUCOMTR-MCNC: 174 MG/DL — HIGH (ref 70–99)
GLUCOSE BLDC GLUCOMTR-MCNC: 175 MG/DL — HIGH (ref 70–99)
GLUCOSE BLDC GLUCOMTR-MCNC: 192 MG/DL — HIGH (ref 70–99)
GLUCOSE BLDC GLUCOMTR-MCNC: 205 MG/DL — HIGH (ref 70–99)
GLUCOSE SERPL-MCNC: 147 MG/DL — HIGH (ref 70–99)
HCT VFR BLD CALC: 32.2 % — LOW (ref 34.5–45)
HGB BLD-MCNC: 10.5 G/DL — LOW (ref 11.5–15.5)
MAGNESIUM SERPL-MCNC: 2 MG/DL — SIGNIFICANT CHANGE UP (ref 1.6–2.6)
MCHC RBC-ENTMCNC: 32.6 GM/DL — SIGNIFICANT CHANGE UP (ref 32–36)
MCHC RBC-ENTMCNC: 34.7 PG — HIGH (ref 27–34)
MCV RBC AUTO: 106.3 FL — HIGH (ref 80–100)
NRBC # BLD: 0 /100 WBCS — SIGNIFICANT CHANGE UP (ref 0–0)
NT-PROBNP SERPL-SCNC: HIGH PG/ML (ref 0–300)
PLATELET # BLD AUTO: 67 K/UL — LOW (ref 150–400)
POTASSIUM SERPL-MCNC: 4.3 MMOL/L — SIGNIFICANT CHANGE UP (ref 3.5–5.3)
POTASSIUM SERPL-SCNC: 4.3 MMOL/L — SIGNIFICANT CHANGE UP (ref 3.5–5.3)
RBC # BLD: 3.03 M/UL — LOW (ref 3.8–5.2)
RBC # FLD: 17.9 % — HIGH (ref 10.3–14.5)
SODIUM SERPL-SCNC: 131 MMOL/L — LOW (ref 135–145)
WBC # BLD: 4.18 K/UL — SIGNIFICANT CHANGE UP (ref 3.8–10.5)
WBC # FLD AUTO: 4.18 K/UL — SIGNIFICANT CHANGE UP (ref 3.8–10.5)

## 2021-01-23 RX ORDER — SENNA PLUS 8.6 MG/1
2 TABLET ORAL AT BEDTIME
Refills: 0 | Status: DISCONTINUED | OUTPATIENT
Start: 2021-01-23 | End: 2021-01-25

## 2021-01-23 RX ADMIN — Medication 2: at 12:50

## 2021-01-23 RX ADMIN — Medication 25 MILLIGRAM(S): at 06:21

## 2021-01-23 RX ADMIN — HEPARIN SODIUM 5000 UNIT(S): 5000 INJECTION INTRAVENOUS; SUBCUTANEOUS at 17:23

## 2021-01-23 RX ADMIN — SENNA PLUS 2 TABLET(S): 8.6 TABLET ORAL at 22:15

## 2021-01-23 RX ADMIN — Medication 20 MILLIGRAM(S): at 17:23

## 2021-01-23 RX ADMIN — MILRINONE LACTATE 2.15 MICROGRAM(S)/KG/MIN: 1 INJECTION, SOLUTION INTRAVENOUS at 17:23

## 2021-01-23 RX ADMIN — SODIUM CHLORIDE 1 GRAM(S): 9 INJECTION INTRAMUSCULAR; INTRAVENOUS; SUBCUTANEOUS at 10:17

## 2021-01-23 RX ADMIN — Medication 2: at 10:16

## 2021-01-23 RX ADMIN — Medication 4: at 17:31

## 2021-01-23 RX ADMIN — Medication 5 MILLIGRAM(S): at 17:31

## 2021-01-23 RX ADMIN — Medication 25 MILLIGRAM(S): at 17:23

## 2021-01-23 RX ADMIN — Medication 20 MILLIGRAM(S): at 06:21

## 2021-01-23 RX ADMIN — ISOSORBIDE MONONITRATE 30 MILLIGRAM(S): 60 TABLET, EXTENDED RELEASE ORAL at 10:17

## 2021-01-23 RX ADMIN — HEPARIN SODIUM 5000 UNIT(S): 5000 INJECTION INTRAVENOUS; SUBCUTANEOUS at 06:21

## 2021-01-23 NOTE — PROGRESS NOTE ADULT - ASSESSMENT
A/P  89F with h/o HTN, HLD, DM2, CKD III, HFrEF w/ severely reduced LVEF (20-25%), severe MR, and severe pHTN who was brought in by family on account of  dyspnea. On exam pt with decreased breath sounds b/l and b/l LE pitting edema. Labs notable for markedly elevated bnp, hyperkalemia, hyponatremia and thrombocytopenia. She is being admitted for acute on chronic systolic heart failure.    Problem/Plan - 1:  ·  Problem: Acute on chronic systolic congestive heart failure.     - presenting with worsening dyspnea, LE edema, weight gain  - b[place] [id number] on admission   - TTE done 12/2020 w/EF 20-25%  - c/w IV diuresis w/ Lasix as above  - pt on Milrinone   - cardio appreciated     Problem/Plan - 2:  ·  Problem: Type 2 diabetes mellitus with other specified complication, without long-term current use of insulin.    - on Glipizide and Tradjenta at home ; hold while inpatient  - sliding scale  - Carb/consistent diet  - FS qac qhs.     Problem/Plan - 3:  ·  Problem: Hyperkalemia.  Improved   - monitor BMP     Problem/Plan - 4:  ·  Problem: Hyponatremia, chronic baseline Na ~ 120   - c/w diuresis  today sod 131  - 1L Fluid restriction  - Nephrology on board     Problem/Plan - 5:  ·  Problem: Stage 3 chronic kidney disease, unspecified whether stage 3a or 3b CKD.  Plan: - serum Cr 1.4 ( baseline)  - c/t monitor  - avoid nephrotoxic medications.   - renal on board    Problem/Plan - 6:  Problem: Prophylactic measure. Plan: - Heparin SC for DVT ppx.    moni victoria MD

## 2021-01-23 NOTE — PROGRESS NOTE ADULT - SUBJECTIVE AND OBJECTIVE BOX
Patient is a 89y old  Female who presents with a chief complaint of shortness of breath (23 Jan 2021 11:46)      INTERVAL HPI/OVERNIGHT EVENTS:  T(C): 36.5 (01-23-21 @ 21:00), Max: 36.8 (01-23-21 @ 05:14)  HR: 102 (01-23-21 @ 21:00) (99 - 105)  BP: 107/69 (01-23-21 @ 21:00) (107/69 - 129/76)  RR: 18 (01-23-21 @ 21:00) (18 - 18)  SpO2: 99% (01-23-21 @ 21:00) (99% - 100%)  Wt(kg): --  I&O's Summary    22 Jan 2021 07:01  -  23 Jan 2021 07:00  --------------------------------------------------------  IN: 545.8 mL / OUT: 600 mL / NET: -54.2 mL    23 Jan 2021 07:01  -  23 Jan 2021 23:52  --------------------------------------------------------  IN: 665.8 mL / OUT: 0 mL / NET: 665.8 mL        PAST MEDICAL & SURGICAL HISTORY:  DM (diabetes mellitus)    CHF (congestive heart failure)    No significant past surgical history        SOCIAL HISTORY  Alcohol:  Tobacco:  Illicit substance use:    FAMILY HISTORY:    REVIEW OF SYSTEMS:  CONSTITUTIONAL: No fever, weight loss, or fatigue  EYES: No eye pain, visual disturbances, or discharge  ENMT:  No difficulty hearing, tinnitus, vertigo; No sinus or throat pain  NECK: No pain or stiffness  RESPIRATORY: No cough, wheezing, chills or hemoptysis; No shortness of breath  CARDIOVASCULAR: No chest pain, palpitations, dizziness, or leg swelling  GASTROINTESTINAL: No abdominal or epigastric pain. No nausea, vomiting, or hematemesis; No diarrhea or constipation. No melena or hematochezia.  GENITOURINARY: No dysuria, frequency, hematuria, or incontinence  NEUROLOGICAL: No headaches, memory loss, loss of strength, numbness, or tremors  SKIN: No itching, burning, rashes, or lesions   LYMPH NODES: No enlarged glands  ENDOCRINE: No heat or cold intolerance; No hair loss  MUSCULOSKELETAL: No joint pain or swelling; No muscle, back, or extremity pain  PSYCHIATRIC: No depression, anxiety, mood swings, or difficulty sleeping  HEME/LYMPH: No easy bruising, or bleeding gums  ALLERY AND IMMUNOLOGIC: No hives or eczema    RADIOLOGY & ADDITIONAL TESTS:    Imaging Personally Reviewed:  [ ] YES  [ ] NO    Consultant(s) Notes Reviewed:  [ ] YES  [ ] NO    PHYSICAL EXAM:  GENERAL: NAD, well-groomed, well-developed  HEAD:  Atraumatic, Normocephalic  EYES: EOMI, PERRLA, conjunctiva and sclera clear  ENMT: No tonsillar erythema, exudates, or enlargement; Moist mucous membranes, Good dentition, No lesions  NECK: Supple, No JVD, Normal thyroid  NERVOUS SYSTEM:  Alert & Oriented X3, Good concentration; Motor Strength 5/5 B/L upper and lower extremities; DTRs 2+ intact and symmetric  CHEST/LUNG: Clear to percussion bilaterally; No rales, rhonchi, wheezing, or rubs  HEART: Regular rate and rhythm; No murmurs, rubs, or gallops  ABDOMEN: Soft, Nontender, Nondistended; Bowel sounds present  EXTREMITIES:  2+ Peripheral Pulses, No clubbing, cyanosis, or edema  LYMPH: No lymphadenopathy noted  SKIN: No rashes or lesions    LABS:                        10.5   4.18  )-----------( 67       ( 23 Jan 2021 07:17 )             32.2     01-23    131<L>  |  95<L>  |  48<H>  ----------------------------<  147<H>  4.3   |  25  |  1.22    Ca    9.1      23 Jan 2021 07:18  Mg     2.0     01-23          CAPILLARY BLOOD GLUCOSE      POCT Blood Glucose.: 174 mg/dL (23 Jan 2021 21:24)  POCT Blood Glucose.: 205 mg/dL (23 Jan 2021 17:13)  POCT Blood Glucose.: 192 mg/dL (23 Jan 2021 12:48)  POCT Blood Glucose.: 175 mg/dL (23 Jan 2021 09:15)            MEDICATIONS  (STANDING):  dextrose 40% Gel 15 Gram(s) Oral once  dextrose 5%. 1000 milliLiter(s) (50 mL/Hr) IV Continuous <Continuous>  dextrose 5%. 1000 milliLiter(s) (100 mL/Hr) IV Continuous <Continuous>  dextrose 50% Injectable 25 Gram(s) IV Push once  dextrose 50% Injectable 12.5 Gram(s) IV Push once  dextrose 50% Injectable 25 Gram(s) IV Push once  furosemide   Injectable 20 milliGRAM(s) IV Push every 12 hours  glucagon  Injectable 1 milliGRAM(s) IntraMuscular once  glucagon  Injectable 1 milliGRAM(s) IntraMuscular once  heparin   Injectable 5000 Unit(s) SubCutaneous every 12 hours  hydrALAZINE 25 milliGRAM(s) Oral two times a day  insulin lispro (ADMELOG) corrective regimen sliding scale   SubCutaneous three times a day before meals  insulin lispro (ADMELOG) corrective regimen sliding scale   SubCutaneous at bedtime  isosorbide   mononitrate ER Tablet (IMDUR) 30 milliGRAM(s) Oral daily  metoprolol succinate ER 25 milliGRAM(s) Oral daily  milrinone Infusion 0.125 MICROgram(s)/kG/Min (2.15 mL/Hr) IV Continuous <Continuous>  senna 2 Tablet(s) Oral at bedtime  sodium chloride 1 Gram(s) Oral daily    MEDICATIONS  (PRN):      Care Discussed with Consultants/Other Providers [ ] YES  [ ] NO Patient is a 89y old  Female who presents with a chief complaint of shortness of breath (23 Jan 2021 11:46)    pt. seen and examined     INTERVAL HPI/OVERNIGHT EVENTS:  T(C): 36.5 (01-23-21 @ 21:00), Max: 36.8 (01-23-21 @ 05:14)  HR: 102 (01-23-21 @ 21:00) (99 - 105)  BP: 107/69 (01-23-21 @ 21:00) (107/69 - 129/76)  RR: 18 (01-23-21 @ 21:00) (18 - 18)  SpO2: 99% (01-23-21 @ 21:00) (99% - 100%)  Wt(kg): --  I&O's Summary    22 Jan 2021 07:01  -  23 Jan 2021 07:00  --------------------------------------------------------  IN: 545.8 mL / OUT: 600 mL / NET: -54.2 mL    23 Jan 2021 07:01  -  23 Jan 2021 23:52  --------------------------------------------------------  IN: 665.8 mL / OUT: 0 mL / NET: 665.8 mL        PAST MEDICAL & SURGICAL HISTORY:  DM (diabetes mellitus)    CHF (congestive heart failure)    No significant past surgical history        SOCIAL HISTORY  Alcohol:  Tobacco:  Illicit substance use:    FAMILY HISTORY:    REVIEW OF SYSTEMS:  CONSTITUTIONAL: No fever, weight loss, or fatigue  EYES: No eye pain, visual disturbances, or discharge  ENMT:  No difficulty hearing, tinnitus, vertigo; No sinus or throat pain  NECK: No pain or stiffness  RESPIRATORY: No cough, wheezing, chills or hemoptysis; No shortness of breath  CARDIOVASCULAR: No chest pain, palpitations, dizziness, or leg swelling  GASTROINTESTINAL: No abdominal or epigastric pain. No nausea, vomiting, or hematemesis; No diarrhea or constipation. No melena or hematochezia.  GENITOURINARY: No dysuria, frequency, hematuria, or incontinence  NEUROLOGICAL: No headaches, memory loss, loss of strength, numbness, or tremors  SKIN: No itching, burning, rashes, or lesions   LYMPH NODES: No enlarged glands  ENDOCRINE: No heat or cold intolerance; No hair loss  MUSCULOSKELETAL: No joint pain or swelling; No muscle, back, or extremity pain  PSYCHIATRIC: No depression, anxiety, mood swings, or difficulty sleeping  HEME/LYMPH: No easy bruising, or bleeding gums  ALLERY AND IMMUNOLOGIC: No hives or eczema    RADIOLOGY & ADDITIONAL TESTS:    Imaging Personally Reviewed:  [ ] YES  [ ] NO    Consultant(s) Notes Reviewed:  [ ] YES  [ ] NO    PHYSICAL EXAM:  GENERAL: NAD, well-groomed, well-developed  HEAD:  Atraumatic, Normocephalic  EYES: EOMI, PERRLA, conjunctiva and sclera clear  ENMT: No tonsillar erythema, exudates, or enlargement; Moist mucous membranes, Good dentition, No lesions  NECK: Supple, No JVD, Normal thyroid  NERVOUS SYSTEM:  Alert & Oriented X3, Good concentration; Motor Strength 5/5 B/L upper and lower extremities; DTRs 2+ intact and symmetric  CHEST/LUNG: Clear to percussion bilaterally; No rales, rhonchi, wheezing, or rubs  HEART: Regular rate and rhythm; No murmurs, rubs, or gallops  ABDOMEN: Soft, Nontender, Nondistended; Bowel sounds present  EXTREMITIES:  2+ Peripheral Pulses, No clubbing, cyanosis, or edema  LYMPH: No lymphadenopathy noted  SKIN: No rashes or lesions    LABS:                        10.5   4.18  )-----------( 67       ( 23 Jan 2021 07:17 )             32.2     01-23    131<L>  |  95<L>  |  48<H>  ----------------------------<  147<H>  4.3   |  25  |  1.22    Ca    9.1      23 Jan 2021 07:18  Mg     2.0     01-23          CAPILLARY BLOOD GLUCOSE      POCT Blood Glucose.: 174 mg/dL (23 Jan 2021 21:24)  POCT Blood Glucose.: 205 mg/dL (23 Jan 2021 17:13)  POCT Blood Glucose.: 192 mg/dL (23 Jan 2021 12:48)  POCT Blood Glucose.: 175 mg/dL (23 Jan 2021 09:15)            MEDICATIONS  (STANDING):  dextrose 40% Gel 15 Gram(s) Oral once  dextrose 5%. 1000 milliLiter(s) (50 mL/Hr) IV Continuous <Continuous>  dextrose 5%. 1000 milliLiter(s) (100 mL/Hr) IV Continuous <Continuous>  dextrose 50% Injectable 25 Gram(s) IV Push once  dextrose 50% Injectable 12.5 Gram(s) IV Push once  dextrose 50% Injectable 25 Gram(s) IV Push once  furosemide   Injectable 20 milliGRAM(s) IV Push every 12 hours  glucagon  Injectable 1 milliGRAM(s) IntraMuscular once  glucagon  Injectable 1 milliGRAM(s) IntraMuscular once  heparin   Injectable 5000 Unit(s) SubCutaneous every 12 hours  hydrALAZINE 25 milliGRAM(s) Oral two times a day  insulin lispro (ADMELOG) corrective regimen sliding scale   SubCutaneous three times a day before meals  insulin lispro (ADMELOG) corrective regimen sliding scale   SubCutaneous at bedtime  isosorbide   mononitrate ER Tablet (IMDUR) 30 milliGRAM(s) Oral daily  metoprolol succinate ER 25 milliGRAM(s) Oral daily  milrinone Infusion 0.125 MICROgram(s)/kG/Min (2.15 mL/Hr) IV Continuous <Continuous>  senna 2 Tablet(s) Oral at bedtime  sodium chloride 1 Gram(s) Oral daily    MEDICATIONS  (PRN):      Care Discussed with Consultants/Other Providers [ ] YES  [ ] NO

## 2021-01-23 NOTE — PROGRESS NOTE ADULT - SUBJECTIVE AND OBJECTIVE BOX
Patient is a 89y Female whom presented to the hospital with hyponatremia    PAST MEDICAL & SURGICAL HISTORY:  DM (diabetes mellitus)    CHF (congestive heart failure)    No significant past surgical history        MEDICATIONS  (STANDING):  dextrose 40% Gel 15 Gram(s) Oral once  dextrose 5%. 1000 milliLiter(s) (50 mL/Hr) IV Continuous <Continuous>  dextrose 5%. 1000 milliLiter(s) (100 mL/Hr) IV Continuous <Continuous>  dextrose 50% Injectable 25 Gram(s) IV Push once  dextrose 50% Injectable 12.5 Gram(s) IV Push once  dextrose 50% Injectable 25 Gram(s) IV Push once  furosemide   Injectable 20 milliGRAM(s) IV Push every 12 hours  glucagon  Injectable 1 milliGRAM(s) IntraMuscular once  heparin   Injectable 5000 Unit(s) SubCutaneous every 12 hours  insulin lispro (ADMELOG) corrective regimen sliding scale   SubCutaneous three times a day before meals  metoprolol succinate ER 25 milliGRAM(s) Oral daily  milrinone Infusion 0.125 MICROgram(s)/kG/Min (2.15 mL/Hr) IV Continuous <Continuous>      Allergies    No Known Allergies    Intolerances        SOCIAL HISTORY:  Denies ETOh,Smoking,     FAMILY HISTORY:  No pertinent family history in first degree relatives        REVIEW OF SYSTEMS:    unable to obtained a good review system                                              10.5   4.18  )-----------( 67       ( 23 Jan 2021 07:17 )             32.2       CBC Full  -  ( 23 Jan 2021 07:17 )  WBC Count : 4.18 K/uL  RBC Count : 3.03 M/uL  Hemoglobin : 10.5 g/dL  Hematocrit : 32.2 %  Platelet Count - Automated : 67 K/uL  Mean Cell Volume : 106.3 fl  Mean Cell Hemoglobin : 34.7 pg  Mean Cell Hemoglobin Concentration : 32.6 gm/dL  Auto Neutrophil # : x  Auto Lymphocyte # : x  Auto Monocyte # : x  Auto Eosinophil # : x  Auto Basophil # : x  Auto Neutrophil % : x  Auto Lymphocyte % : x  Auto Monocyte % : x  Auto Eosinophil % : x  Auto Basophil % : x      01-23    131<L>  |  95<L>  |  48<H>  ----------------------------<  147<H>  4.3   |  25  |  1.22    Ca    9.1      23 Jan 2021 07:18  Mg     2.0     01-23        CAPILLARY BLOOD GLUCOSE      POCT Blood Glucose.: 205 mg/dL (23 Jan 2021 17:13)  POCT Blood Glucose.: 192 mg/dL (23 Jan 2021 12:48)  POCT Blood Glucose.: 175 mg/dL (23 Jan 2021 09:15)  POCT Blood Glucose.: 188 mg/dL (22 Jan 2021 22:10)  POCT Blood Glucose.: 165 mg/dL (22 Jan 2021 17:53)      Vital Signs Last 24 Hrs  T(C): 36.4 (23 Jan 2021 12:32), Max: 36.9 (22 Jan 2021 20:45)  T(F): 97.5 (23 Jan 2021 12:32), Max: 98.4 (22 Jan 2021 20:45)  HR: 99 (23 Jan 2021 12:32) (99 - 105)  BP: 115/65 (23 Jan 2021 12:32) (115/65 - 129/76)  BP(mean): --  RR: 18 (23 Jan 2021 12:32) (18 - 18)  SpO2: 99% (23 Jan 2021 12:32) (99% - 100%)                  PHYSICAL EXAM:    Constitutional: NAD  HEENT: conjunctive   clear   Neck:  No JVD  Respiratory: decrease bs b/l   Cardiovascular: S1 and S2  Gastrointestinal: BS+, soft,   Extremities: pos  peripheral edema

## 2021-01-23 NOTE — PROGRESS NOTE ADULT - SUBJECTIVE AND OBJECTIVE BOX
CARDIOLOGY     PROGRESS  NOTE   ________________________________________________    CHIEF COMPLAINT:Patient is a 89y old  Female who presents with a chief complaint of shortness of breath (22 Jan 2021 12:21)  no complain.  	  REVIEW OF SYSTEMS:  CONSTITUTIONAL: No fever, weight loss, or fatigue  EYES: No eye pain, visual disturbances, or discharge  ENT:  No difficulty hearing, tinnitus, vertigo; No sinus or throat pain  NECK: No pain or stiffness  RESPIRATORY: No cough, wheezing, chills or hemoptysis; No Shortness of Breath  CARDIOVASCULAR: No chest pain, palpitations, passing out, dizziness, or leg swelling  GASTROINTESTINAL: No abdominal or epigastric pain. No nausea, vomiting, or hematemesis; No diarrhea or constipation. No melena or hematochezia.  GENITOURINARY: No dysuria, frequency, hematuria, or incontinence  NEUROLOGICAL: No headaches, memory loss, loss of strength, numbness, or tremors  SKIN: No itching, burning, rashes, or lesions   LYMPH Nodes: No enlarged glands  ENDOCRINE: No heat or cold intolerance; No hair loss  MUSCULOSKELETAL: No joint pain or swelling; No muscle, back, or extremity pain  PSYCHIATRIC: No depression, anxiety, mood swings, or difficulty sleeping  HEME/LYMPH: No easy bruising, or bleeding gums  ALLERGY AND IMMUNOLOGIC: No hives or eczema	    [ ] All others negative	  [ ] Unable to obtain    PHYSICAL EXAM:  T(C): 36.8 (01-23-21 @ 05:14), Max: 36.9 (01-22-21 @ 20:45)  HR: 105 (01-23-21 @ 05:14) (91 - 105)  BP: 129/76 (01-23-21 @ 05:14) (119/72 - 129/76)  RR: 18 (01-23-21 @ 05:14) (18 - 18)  SpO2: 100% (01-23-21 @ 05:14) (100% - 100%)  Wt(kg): --  I&O's Summary    22 Jan 2021 07:01  -  23 Jan 2021 07:00  --------------------------------------------------------  IN: 545.8 mL / OUT: 600 mL / NET: -54.2 mL        Appearance: Normal	  HEENT:   Normal oral mucosa, PERRL, EOMI	  Lymphatic: No lymphadenopathy  Cardiovascular: Normal S1 S2, No JVD, + murmurs, No edema  Respiratory: Lungs clear to auscultation	  Psychiatry: A & O x 3, Mood & affect appropriate  Gastrointestinal:  Soft, Non-tender, + BS	  Skin: No rashes, No ecchymoses, No cyanosis	  Neurologic: Non-focal  Extremities: Normal range of motion, No clubbing, cyanosis or edema  Vascular: Peripheral pulses palpable 2+ bilaterally    MEDICATIONS  (STANDING):  dextrose 40% Gel 15 Gram(s) Oral once  dextrose 5%. 1000 milliLiter(s) (50 mL/Hr) IV Continuous <Continuous>  dextrose 5%. 1000 milliLiter(s) (100 mL/Hr) IV Continuous <Continuous>  dextrose 50% Injectable 25 Gram(s) IV Push once  dextrose 50% Injectable 12.5 Gram(s) IV Push once  dextrose 50% Injectable 25 Gram(s) IV Push once  furosemide   Injectable 20 milliGRAM(s) IV Push every 12 hours  glucagon  Injectable 1 milliGRAM(s) IntraMuscular once  glucagon  Injectable 1 milliGRAM(s) IntraMuscular once  heparin   Injectable 5000 Unit(s) SubCutaneous every 12 hours  hydrALAZINE 25 milliGRAM(s) Oral two times a day  insulin lispro (ADMELOG) corrective regimen sliding scale   SubCutaneous three times a day before meals  insulin lispro (ADMELOG) corrective regimen sliding scale   SubCutaneous at bedtime  isosorbide   mononitrate ER Tablet (IMDUR) 30 milliGRAM(s) Oral daily  metoprolol succinate ER 25 milliGRAM(s) Oral daily  milrinone Infusion 0.125 MICROgram(s)/kG/Min (2.15 mL/Hr) IV Continuous <Continuous>  sodium chloride 1 Gram(s) Oral daily      TELEMETRY: 	    ECG:  	  RADIOLOGY:  OTHER: 	  	  LABS:	 	    CARDIAC MARKERS:                                10.5   4.18  )-----------( 67       ( 23 Jan 2021 07:17 )             32.2     01-23    131<L>  |  95<L>  |  48<H>  ----------------------------<  147<H>  4.3   |  25  |  1.22    Ca    9.1      23 Jan 2021 07:18  Mg     2.0     01-23      proBNP: Serum Pro-Brain Natriuretic Peptide: 43280 pg/mL (01-19 @ 12:59)    Lipid Profile:   HgA1c:   TSH:         Assessment and plan  ---------------------------  89F with h/o HTN, HLD, DM2, CKD III, HFrEF w/ severely reduced LVEF(20-25%),  severe MR, and severe pHTN who was brought in by family on account of worsening dyspnea. Per daughter pt is usually short of breath at baseline but this morning was noted to be worse and appeared to be in respiratory distress. Pt also c/o chest pressure. She also reports worsening LE swelling, recent weight gain, abd distension and extreme fatigue. She  denies fever/chills, n/v, cough.  pt with hx of chf/cardiomyopathy with hyponatremia and sob  pt with hyponatremia ?sec to fluid overload, improving  iv lasix  tsh  will review old echo  dvt prophylaxis  will consider pos inotrope ??  fu lytes closely  continue  on milrinone drip, fu i/o  fu sodium level  hr was well controlled over night  continue diuresis  will add hydralazine and nitrate, increase as tolerated  decrease fluid intake  prognosis is poor

## 2021-01-24 LAB
ALBUMIN SERPL ELPH-MCNC: 3.3 G/DL — SIGNIFICANT CHANGE UP (ref 3.3–5)
ALP SERPL-CCNC: 58 U/L — SIGNIFICANT CHANGE UP (ref 40–120)
ALT FLD-CCNC: 20 U/L — SIGNIFICANT CHANGE UP (ref 10–45)
ANION GAP SERPL CALC-SCNC: 14 MMOL/L — SIGNIFICANT CHANGE UP (ref 5–17)
AST SERPL-CCNC: 51 U/L — HIGH (ref 10–40)
BILIRUB SERPL-MCNC: 1.4 MG/DL — HIGH (ref 0.2–1.2)
BUN SERPL-MCNC: 52 MG/DL — HIGH (ref 7–23)
CALCIUM SERPL-MCNC: 9.5 MG/DL — SIGNIFICANT CHANGE UP (ref 8.4–10.5)
CHLORIDE SERPL-SCNC: 97 MMOL/L — SIGNIFICANT CHANGE UP (ref 96–108)
CO2 SERPL-SCNC: 22 MMOL/L — SIGNIFICANT CHANGE UP (ref 22–31)
CREAT SERPL-MCNC: 1.18 MG/DL — SIGNIFICANT CHANGE UP (ref 0.5–1.3)
GLUCOSE BLDC GLUCOMTR-MCNC: 147 MG/DL — HIGH (ref 70–99)
GLUCOSE BLDC GLUCOMTR-MCNC: 177 MG/DL — HIGH (ref 70–99)
GLUCOSE BLDC GLUCOMTR-MCNC: 225 MG/DL — HIGH (ref 70–99)
GLUCOSE BLDC GLUCOMTR-MCNC: 234 MG/DL — HIGH (ref 70–99)
GLUCOSE SERPL-MCNC: 126 MG/DL — HIGH (ref 70–99)
HCT VFR BLD CALC: 33.4 % — LOW (ref 34.5–45)
HGB BLD-MCNC: 10.4 G/DL — LOW (ref 11.5–15.5)
MAGNESIUM SERPL-MCNC: 2.1 MG/DL — SIGNIFICANT CHANGE UP (ref 1.6–2.6)
MCHC RBC-ENTMCNC: 31.1 GM/DL — LOW (ref 32–36)
MCHC RBC-ENTMCNC: 33.5 PG — SIGNIFICANT CHANGE UP (ref 27–34)
MCV RBC AUTO: 107.7 FL — HIGH (ref 80–100)
NRBC # BLD: 0 /100 WBCS — SIGNIFICANT CHANGE UP (ref 0–0)
PLATELET # BLD AUTO: 45 K/UL — LOW (ref 150–400)
POTASSIUM SERPL-MCNC: 4.7 MMOL/L — SIGNIFICANT CHANGE UP (ref 3.5–5.3)
POTASSIUM SERPL-SCNC: 4.7 MMOL/L — SIGNIFICANT CHANGE UP (ref 3.5–5.3)
PROT SERPL-MCNC: 5.9 G/DL — LOW (ref 6–8.3)
RBC # BLD: 3.1 M/UL — LOW (ref 3.8–5.2)
RBC # FLD: 17.6 % — HIGH (ref 10.3–14.5)
SODIUM SERPL-SCNC: 133 MMOL/L — LOW (ref 135–145)
WBC # BLD: 4.95 K/UL — SIGNIFICANT CHANGE UP (ref 3.8–10.5)
WBC # FLD AUTO: 4.95 K/UL — SIGNIFICANT CHANGE UP (ref 3.8–10.5)

## 2021-01-24 RX ORDER — BUMETANIDE 0.25 MG/ML
1 INJECTION INTRAMUSCULAR; INTRAVENOUS
Refills: 0 | Status: DISCONTINUED | OUTPATIENT
Start: 2021-01-24 | End: 2021-01-25

## 2021-01-24 RX ORDER — MILRINONE LACTATE 1 MG/ML
0.23 INJECTION, SOLUTION INTRAVENOUS
Qty: 20 | Refills: 0 | Status: DISCONTINUED | OUTPATIENT
Start: 2021-01-24 | End: 2021-01-25

## 2021-01-24 RX ORDER — CALCIUM CARBONATE 500(1250)
1 TABLET ORAL
Refills: 0 | Status: DISCONTINUED | OUTPATIENT
Start: 2021-01-24 | End: 2021-01-25

## 2021-01-24 RX ADMIN — Medication 4: at 12:33

## 2021-01-24 RX ADMIN — HEPARIN SODIUM 5000 UNIT(S): 5000 INJECTION INTRAVENOUS; SUBCUTANEOUS at 06:47

## 2021-01-24 RX ADMIN — Medication 20 MILLIGRAM(S): at 06:47

## 2021-01-24 RX ADMIN — Medication 25 MILLIGRAM(S): at 06:47

## 2021-01-24 RX ADMIN — Medication 25 MILLIGRAM(S): at 17:45

## 2021-01-24 RX ADMIN — ISOSORBIDE MONONITRATE 30 MILLIGRAM(S): 60 TABLET, EXTENDED RELEASE ORAL at 12:34

## 2021-01-24 RX ADMIN — MILRINONE LACTATE 4 MICROGRAM(S)/KG/MIN: 1 INJECTION, SOLUTION INTRAVENOUS at 21:35

## 2021-01-24 RX ADMIN — HEPARIN SODIUM 5000 UNIT(S): 5000 INJECTION INTRAVENOUS; SUBCUTANEOUS at 17:46

## 2021-01-24 RX ADMIN — BUMETANIDE 1 MILLIGRAM(S): 0.25 INJECTION INTRAMUSCULAR; INTRAVENOUS at 17:45

## 2021-01-24 RX ADMIN — Medication 2: at 08:34

## 2021-01-24 NOTE — PROGRESS NOTE ADULT - SUBJECTIVE AND OBJECTIVE BOX
CARDIOLOGY     PROGRESS  NOTE   ________________________________________________    CHIEF COMPLAINT:Patient is a 89y old  Female who presents with a chief complaint of shortness of breath (23 Jan 2021 20:52)  no complain.  	  REVIEW OF SYSTEMS:  CONSTITUTIONAL: No fever, weight loss, or fatigue  EYES: No eye pain, visual disturbances, or discharge  ENT:  No difficulty hearing, tinnitus, vertigo; No sinus or throat pain  NECK: No pain or stiffness  RESPIRATORY: No cough, wheezing, chills or hemoptysis; No Shortness of Breath  CARDIOVASCULAR: No chest pain, palpitations, passing out, dizziness, or leg swelling  GASTROINTESTINAL: No abdominal or epigastric pain. No nausea, vomiting, or hematemesis; No diarrhea or constipation. No melena or hematochezia.  GENITOURINARY: No dysuria, frequency, hematuria, or incontinence  NEUROLOGICAL: No headaches, memory loss, loss of strength, numbness, or tremors  SKIN: No itching, burning, rashes, or lesions   LYMPH Nodes: No enlarged glands  ENDOCRINE: No heat or cold intolerance; No hair loss  MUSCULOSKELETAL: No joint pain or swelling; No muscle, back, or extremity pain  PSYCHIATRIC: No depression, anxiety, mood swings, or difficulty sleeping  HEME/LYMPH: No easy bruising, or bleeding gums  ALLERGY AND IMMUNOLOGIC: No hives or eczema	    [ ] All others negative	  [ ] Unable to obtain    PHYSICAL EXAM:  T(C): 37.1 (01-24-21 @ 04:04), Max: 37.1 (01-24-21 @ 04:04)  HR: 102 (01-24-21 @ 04:04) (99 - 102)  BP: 135/82 (01-24-21 @ 04:04) (107/69 - 135/82)  RR: 18 (01-24-21 @ 04:04) (18 - 18)  SpO2: 100% (01-24-21 @ 04:04) (99% - 100%)  Wt(kg): --  I&O's Summary    23 Jan 2021 07:01  -  24 Jan 2021 07:00  --------------------------------------------------------  IN: 665.8 mL / OUT: 0 mL / NET: 665.8 mL        Appearance: Normal	  HEENT:   Normal oral mucosa, PERRL, EOMI	  Lymphatic: No lymphadenopathy  Cardiovascular: Normal S1 S2, No JVD,+ murmurs, No edema  Respiratory: Lungs clear to auscultation	  Gastrointestinal:  Soft, Non-tender, + BS	  Skin: No rashes, No ecchymoses, No cyanosis	  Neurologic: Non-focal  Extremities: Normal range of motion, No clubbing, cyanosis or edema  Vascular: Peripheral pulses palpable 2+ bilaterally    MEDICATIONS  (STANDING):  dextrose 40% Gel 15 Gram(s) Oral once  dextrose 5%. 1000 milliLiter(s) (50 mL/Hr) IV Continuous <Continuous>  dextrose 5%. 1000 milliLiter(s) (100 mL/Hr) IV Continuous <Continuous>  dextrose 50% Injectable 25 Gram(s) IV Push once  dextrose 50% Injectable 12.5 Gram(s) IV Push once  dextrose 50% Injectable 25 Gram(s) IV Push once  furosemide   Injectable 20 milliGRAM(s) IV Push every 12 hours  glucagon  Injectable 1 milliGRAM(s) IntraMuscular once  glucagon  Injectable 1 milliGRAM(s) IntraMuscular once  heparin   Injectable 5000 Unit(s) SubCutaneous every 12 hours  hydrALAZINE 25 milliGRAM(s) Oral two times a day  insulin lispro (ADMELOG) corrective regimen sliding scale   SubCutaneous three times a day before meals  insulin lispro (ADMELOG) corrective regimen sliding scale   SubCutaneous at bedtime  isosorbide   mononitrate ER Tablet (IMDUR) 30 milliGRAM(s) Oral daily  metoprolol succinate ER 25 milliGRAM(s) Oral daily  milrinone Infusion 0.125 MICROgram(s)/kG/Min (2.15 mL/Hr) IV Continuous <Continuous>  senna 2 Tablet(s) Oral at bedtime  sodium chloride 1 Gram(s) Oral daily      TELEMETRY: 	    ECG:  	  RADIOLOGY:  OTHER: 	  	  LABS:	 	    CARDIAC MARKERS:                                10.4   4.95  )-----------( 45       ( 24 Jan 2021 07:18 )             33.4     01-24    133<L>  |  97  |  52<H>  ----------------------------<  126<H>  4.7   |  22  |  1.18    Ca    9.5      24 Jan 2021 07:17  Mg     2.1     01-24    TPro  5.9<L>  /  Alb  3.3  /  TBili  1.4<H>  /  DBili  x   /  AST  51<H>  /  ALT  20  /  AlkPhos  58  01-24    proBNP: Serum Pro-Brain Natriuretic Peptide: 52276 pg/mL (01-23 @ 15:00)  Serum Pro-Brain Natriuretic Peptide: 00577 pg/mL (01-19 @ 12:59)    Lipid Profile:   HgA1c:   TSH:   < from: Xray Chest 1 View- PORTABLE-Urgent (Xray Chest 1 View- PORTABLE-Urgent .) (01.19.21 @ 13:22) >  The heart is slightly enlarged. Bilateral pleural effusion. If clinically warranted CT scan should be obtained. Degenerative changes of the thoracic spine.      < end of copied text >        Assessment and plan  ---------------------------  89F with h/o HTN, HLD, DM2, CKD III, HFrEF w/ severely reduced LVEF(20-25%),  severe MR, and severe pHTN who was brought in by family on account of worsening dyspnea. Per daughter pt is usually short of breath at baseline but this morning was noted to be worse and appeared to be in respiratory distress. Pt also c/o chest pressure. She also reports worsening LE swelling, recent weight gain, abd distension and extreme fatigue. She  denies fever/chills, n/v, cough.  pt with hx of chf/cardiomyopathy with hyponatremia and sob  pt with hyponatremia ?sec to fluid overload, improving  iv lasix  tsh  will review old echo  dvt prophylaxis  will consider pos inotrope ??  fu lytes closely  continue  on milrinone drip, fu i/o  fu sodium level  hr was well controlled over night  continue diuresis  will add hydralazine and nitrate, increase as tolerated  decrease fluid intake  prognosis is poor  i am not sure the I/O documented is correct  increase milrinone drip  change lasix to bumex  dc sodium tanlet

## 2021-01-24 NOTE — PROGRESS NOTE ADULT - ASSESSMENT
_________________________________________________________________________________________  ========>>  M E D I C A L   A T T E N D I N G    F O L L O W  U P  N O T E  <<=========  -----------------------------------------------------------------------------------------------------    - Patient seen and examined by me earlier today.   - In summary,  JEANNA LUNA is a 89y year old woman admitted with SOB  - Patient today overall doing ok, comfortable, eating well     ==================>> REVIEW OF SYSTEM <<=================    limited ROS  GEN: no fever, no chills, no pain  RESP: no SOB, no cough  CVS: no chest pain, no palpitations  GI: no abdominal pain, no nausea  : no dysuria  Neuro: no headache  Derm : no itching    ==================>> PHYSICAL EXAM <<=================    GEN: A&O X 2 , NAD , comfortable  HEENT: NCAT, PERRL, MMM, hearing intact  Neck: supple , no JVD appreciated  CVS: S1S2 , regular , No M/R/G appreciated  PULM: limited exam as not taking deep breaths , has improved   ABD.: soft. non tender, non distended,  bowel sounds present  Extrem: intact pulses , no       ==================>> MEDICATIONS <<====================    buMETAnide Injectable 1 milliGRAM(s) IV Push two times a day  dextrose 40% Gel 15 Gram(s) Oral once  dextrose 5%. 1000 milliLiter(s) IV Continuous <Continuous>  dextrose 5%. 1000 milliLiter(s) IV Continuous <Continuous>  dextrose 50% Injectable 25 Gram(s) IV Push once  dextrose 50% Injectable 12.5 Gram(s) IV Push once  dextrose 50% Injectable 25 Gram(s) IV Push once  glucagon  Injectable 1 milliGRAM(s) IntraMuscular once  glucagon  Injectable 1 milliGRAM(s) IntraMuscular once  heparin   Injectable 5000 Unit(s) SubCutaneous every 12 hours  hydrALAZINE 25 milliGRAM(s) Oral two times a day  insulin lispro (ADMELOG) corrective regimen sliding scale   SubCutaneous three times a day before meals  insulin lispro (ADMELOG) corrective regimen sliding scale   SubCutaneous at bedtime  isosorbide   mononitrate ER Tablet (IMDUR) 30 milliGRAM(s) Oral daily  metoprolol succinate ER 25 milliGRAM(s) Oral daily  milrinone Infusion 0.233 MICROgram(s)/kG/Min IV Continuous <Continuous>  senna 2 Tablet(s) Oral at bedtime    MEDICATIONS  (PRN):  calcium carbonate    500 mG (Tums) Chewable 1 Tablet(s) Chew four times a day PRN Heartburn    ___________  Active diet:  Diet, Consistent Carbohydrate w/Evening Snack:   1000mL Fluid Restriction ([id number])  Supplement Feeding Modality:  Oral  Glucerna Shake Cans or Servings Per Day:  1       Frequency:  Daily  ___________________    ==================>> VITAL SIGNS <<==================    Vital Signs Last 24 HrsT(C): 37 (01-24-21 @ 11:29)  T(F): 98.6 (01-24-21 @ 11:29), Max: 98.7 (01-24-21 @ 04:04)  HR: 106 (01-24-21 @ 11:29) (102 - 106)  BP: 114/73 (01-24-21 @ 11:29)  RR: 18 (01-24-21 @ 11:29) (18 - 18)  SpO2: 100% (01-24-21 @ 11:29) (99% - 100%)      POCT Blood Glucose.: 225 mg/dL (24 Jan 2021 12:24)  POCT Blood Glucose.: 177 mg/dL (24 Jan 2021 08:19)  POCT Blood Glucose.: 174 mg/dL (23 Jan 2021 21:24)  POCT Blood Glucose.: 205 mg/dL (23 Jan 2021 17:13)     ==================>> LAB AND IMAGING <<==================                        10.4   4.95  )-----------( 45       ( 24 Jan 2021 07:18 )             33.4        01-24    133<L>  |  97  |  52<H>  ----------------------------<  126<H>  4.7   |  22  |  1.18    Ca    9.5      24 Jan 2021 07:17  Mg     2.1     01-24    TPro  5.9<L>  /  Alb  3.3  /  TBili  1.4<H>  /  DBili  x   /  AST  51<H>  /  ALT  20  /  AlkPhos  58  01-24    WBC count:   4.95 <<== ,  4.18 <<== ,  3.72 <<== ,  3.77 <<==   Hemoglobin:   10.4 <<==,  10.5 <<==,  9.4 <<==,  10.8 <<==  platelets:  45 <==, 67 <==, 70 <==, 63 <==, 66 <==    Creatinine:  1.18  <<==, 1.22  <<==, 1.25  <<==, 1.29  <<==, 1.31  <<==, 1.41  <<==  Sodium:   133  <==, 131  <==, 131  <==, 130  <==, 129  <==, 122  <==, 126  <==    ___________________________________________________________________________________  ===============>>  A S S E S S M E N T   A N D   P L A N <<===============  ------------------------------------------------------------------------------------------    · Assessment	  89F with h/o HTN, HLD, DM2, CKD III, HFrEF w/ severely reduced LVEF (20-25%), severe MR, and severe pHTN who was brought in by family on account of  dyspnea. On exam pt with decreased breath sounds b/l and b/l LE pitting edema. Labs notable for markedly elevated bnp, hyperkalemia, hyponatremia and thrombocytopenia. She is being admitted for acute on chronic systolic heart failure.    Problem/Plan - 1:  ·  Problem: Acute on chronic systolic congestive heart failure.     - presenting with worsening dyspnea, LE edema, weight gain  - bnp [place] [id number] on admission   - TTE done 12/2020 w/EF 20-25%  - c/w IV diuresis w/ Lasix as above  - pt on Milrinone per cardio  - cardio appreciated   - med adjustment and adjustment as needed  - Continue Current medications otherwise and monitor.   - supportive care   - OOB to chair as able, IS    Problem/Plan - 2:  ·  Problem: Type 2 diabetes mellitus with other specified complication, without long-term current use of insulin.    - on Glipizide and Tradjenta at home ; hold while inpatient  - sliding scale  - Carb/consistent diet  - FS qac qhs.     Problem/Plan - 3:  ·  Problem: Hyperkalemia.  Improved   - monitor BMP     Problem/Plan - 4:  ·  Problem: Hyponatremia, chronic baseline Na ~ 120   - c/w diuresis  - 1L Fluid restriction  - monitor BMP  as improved        Sodium:   133  <==, 131  <==, 131  <==, 130  <==, 129  <==, 122  <==, 126  <==  - Nephrology on board     Problem/Plan - 5:  ·  Problem: Stage 3 chronic kidney disease, unspecified whether stage 3a or 3b CKD.  Plan: - serum Cr 1.4 ( baseline)  - c/t monitor  - avoid nephrotoxic medications.   - renal on board    Problem/Plan - 6:  Problem: Prophylactic measure. Plan: - Heparin SC for DVT ppx.      will discuss  with Dtr  ___________________________  H. LETITIA Diaz.  Pager: [phone]

## 2021-01-24 NOTE — PROGRESS NOTE ADULT - SUBJECTIVE AND OBJECTIVE BOX
Patient is a 89y Female whom presented to the hospital with hyponatremia    PAST MEDICAL & SURGICAL HISTORY:  DM (diabetes mellitus)    CHF (congestive heart failure)    No significant past surgical history        MEDICATIONS  (STANDING):  dextrose 40% Gel 15 Gram(s) Oral once  dextrose 5%. 1000 milliLiter(s) (50 mL/Hr) IV Continuous <Continuous>  dextrose 5%. 1000 milliLiter(s) (100 mL/Hr) IV Continuous <Continuous>  dextrose 50% Injectable 25 Gram(s) IV Push once  dextrose 50% Injectable 12.5 Gram(s) IV Push once  dextrose 50% Injectable 25 Gram(s) IV Push once  furosemide   Injectable 20 milliGRAM(s) IV Push every 12 hours  glucagon  Injectable 1 milliGRAM(s) IntraMuscular once  heparin   Injectable 5000 Unit(s) SubCutaneous every 12 hours  insulin lispro (ADMELOG) corrective regimen sliding scale   SubCutaneous three times a day before meals  metoprolol succinate ER 25 milliGRAM(s) Oral daily  milrinone Infusion 0.125 MICROgram(s)/kG/Min (2.15 mL/Hr) IV Continuous <Continuous>      Allergies    No Known Allergies    Intolerances        SOCIAL HISTORY:  Denies ETOh,Smoking,     FAMILY HISTORY:  No pertinent family history in first degree relatives        REVIEW OF SYSTEMS:    unable to obtained a good review system                                                            10.4   4.95  )-----------( 45       ( 24 Jan 2021 07:18 )             33.4       CBC Full  -  ( 24 Jan 2021 07:18 )  WBC Count : 4.95 K/uL  RBC Count : 3.10 M/uL  Hemoglobin : 10.4 g/dL  Hematocrit : 33.4 %  Platelet Count - Automated : 45 K/uL  Mean Cell Volume : 107.7 fl  Mean Cell Hemoglobin : 33.5 pg  Mean Cell Hemoglobin Concentration : 31.1 gm/dL  Auto Neutrophil # : x  Auto Lymphocyte # : x  Auto Monocyte # : x  Auto Eosinophil # : x  Auto Basophil # : x  Auto Neutrophil % : x  Auto Lymphocyte % : x  Auto Monocyte % : x  Auto Eosinophil % : x  Auto Basophil % : x      01-24    133<L>  |  97  |  52<H>  ----------------------------<  126<H>  4.7   |  22  |  1.18    Ca    9.5      24 Jan 2021 07:17  Mg     2.1     01-24    TPro  5.9<L>  /  Alb  3.3  /  TBili  1.4<H>  /  DBili  x   /  AST  51<H>  /  ALT  20  /  AlkPhos  58  01-24      CAPILLARY BLOOD GLUCOSE      POCT Blood Glucose.: 225 mg/dL (24 Jan 2021 12:24)  POCT Blood Glucose.: 177 mg/dL (24 Jan 2021 08:19)  POCT Blood Glucose.: 174 mg/dL (23 Jan 2021 21:24)  POCT Blood Glucose.: 205 mg/dL (23 Jan 2021 17:13)      Vital Signs Last 24 Hrs  T(C): 37 (24 Jan 2021 11:29), Max: 37.1 (24 Jan 2021 04:04)  T(F): 98.6 (24 Jan 2021 11:29), Max: 98.7 (24 Jan 2021 04:04)  HR: 106 (24 Jan 2021 11:29) (102 - 106)  BP: 114/73 (24 Jan 2021 11:29) (107/69 - 135/82)  BP(mean): --  RR: 18 (24 Jan 2021 11:29) (18 - 18)  SpO2: 100% (24 Jan 2021 11:29) (99% - 100%)                PHYSICAL EXAM:    Constitutional: NAD  HEENT: conjunctive   clear   Neck:  No JVD  Respiratory: decrease bs b/l   Cardiovascular: S1 and S2  Gastrointestinal: BS+, soft,   Extremities: pos  peripheral edema

## 2021-01-25 VITALS
OXYGEN SATURATION: 95 % | DIASTOLIC BLOOD PRESSURE: 66 MMHG | HEART RATE: 114 BPM | RESPIRATION RATE: 18 BRPM | SYSTOLIC BLOOD PRESSURE: 108 MMHG | TEMPERATURE: 98 F

## 2021-01-25 DIAGNOSIS — Z51.5 ENCOUNTER FOR PALLIATIVE CARE: ICD-10-CM

## 2021-01-25 LAB
ANION GAP SERPL CALC-SCNC: 12 MMOL/L — SIGNIFICANT CHANGE UP (ref 5–17)
BUN SERPL-MCNC: 53 MG/DL — HIGH (ref 7–23)
CALCIUM SERPL-MCNC: 9.2 MG/DL — SIGNIFICANT CHANGE UP (ref 8.4–10.5)
CHLORIDE SERPL-SCNC: 97 MMOL/L — SIGNIFICANT CHANGE UP (ref 96–108)
CO2 SERPL-SCNC: 27 MMOL/L — SIGNIFICANT CHANGE UP (ref 22–31)
CREAT SERPL-MCNC: 1.38 MG/DL — HIGH (ref 0.5–1.3)
GLUCOSE BLDC GLUCOMTR-MCNC: 138 MG/DL — HIGH (ref 70–99)
GLUCOSE BLDC GLUCOMTR-MCNC: 162 MG/DL — HIGH (ref 70–99)
GLUCOSE BLDC GLUCOMTR-MCNC: 247 MG/DL — HIGH (ref 70–99)
GLUCOSE SERPL-MCNC: 137 MG/DL — HIGH (ref 70–99)
HCT VFR BLD CALC: 31.1 % — LOW (ref 34.5–45)
HGB BLD-MCNC: 9.8 G/DL — LOW (ref 11.5–15.5)
MAGNESIUM SERPL-MCNC: 1.9 MG/DL — SIGNIFICANT CHANGE UP (ref 1.6–2.6)
MCHC RBC-ENTMCNC: 31.5 GM/DL — LOW (ref 32–36)
MCHC RBC-ENTMCNC: 33.6 PG — SIGNIFICANT CHANGE UP (ref 27–34)
MCV RBC AUTO: 106.5 FL — HIGH (ref 80–100)
NRBC # BLD: 0 /100 WBCS — SIGNIFICANT CHANGE UP (ref 0–0)
PLATELET # BLD AUTO: 71 K/UL — LOW (ref 150–400)
POTASSIUM SERPL-MCNC: 3.9 MMOL/L — SIGNIFICANT CHANGE UP (ref 3.5–5.3)
POTASSIUM SERPL-SCNC: 3.9 MMOL/L — SIGNIFICANT CHANGE UP (ref 3.5–5.3)
RBC # BLD: 2.92 M/UL — LOW (ref 3.8–5.2)
RBC # FLD: 17.4 % — HIGH (ref 10.3–14.5)
SODIUM SERPL-SCNC: 136 MMOL/L — SIGNIFICANT CHANGE UP (ref 135–145)
WBC # BLD: 3.96 K/UL — SIGNIFICANT CHANGE UP (ref 3.8–10.5)
WBC # FLD AUTO: 3.96 K/UL — SIGNIFICANT CHANGE UP (ref 3.8–10.5)

## 2021-01-25 PROCEDURE — 80053 COMPREHEN METABOLIC PANEL: CPT

## 2021-01-25 PROCEDURE — 84550 ASSAY OF BLOOD/URIC ACID: CPT

## 2021-01-25 PROCEDURE — 83735 ASSAY OF MAGNESIUM: CPT

## 2021-01-25 PROCEDURE — 99223 1ST HOSP IP/OBS HIGH 75: CPT

## 2021-01-25 PROCEDURE — 85027 COMPLETE CBC AUTOMATED: CPT

## 2021-01-25 PROCEDURE — 74018 RADEX ABDOMEN 1 VIEW: CPT

## 2021-01-25 PROCEDURE — 82962 GLUCOSE BLOOD TEST: CPT

## 2021-01-25 PROCEDURE — 83605 ASSAY OF LACTIC ACID: CPT

## 2021-01-25 PROCEDURE — 85018 HEMOGLOBIN: CPT

## 2021-01-25 PROCEDURE — 85025 COMPLETE CBC W/AUTO DIFF WBC: CPT

## 2021-01-25 PROCEDURE — 82330 ASSAY OF CALCIUM: CPT

## 2021-01-25 PROCEDURE — 97530 THERAPEUTIC ACTIVITIES: CPT

## 2021-01-25 PROCEDURE — 82803 BLOOD GASES ANY COMBINATION: CPT

## 2021-01-25 PROCEDURE — 83880 ASSAY OF NATRIURETIC PEPTIDE: CPT

## 2021-01-25 PROCEDURE — 85014 HEMATOCRIT: CPT

## 2021-01-25 PROCEDURE — 82435 ASSAY OF BLOOD CHLORIDE: CPT

## 2021-01-25 PROCEDURE — 84132 ASSAY OF SERUM POTASSIUM: CPT

## 2021-01-25 PROCEDURE — 84300 ASSAY OF URINE SODIUM: CPT

## 2021-01-25 PROCEDURE — 86769 SARS-COV-2 COVID-19 ANTIBODY: CPT

## 2021-01-25 PROCEDURE — 80048 BASIC METABOLIC PNL TOTAL CA: CPT

## 2021-01-25 PROCEDURE — 71045 X-RAY EXAM CHEST 1 VIEW: CPT

## 2021-01-25 PROCEDURE — 93005 ELECTROCARDIOGRAM TRACING: CPT

## 2021-01-25 PROCEDURE — 84295 ASSAY OF SERUM SODIUM: CPT

## 2021-01-25 PROCEDURE — 82947 ASSAY GLUCOSE BLOOD QUANT: CPT

## 2021-01-25 PROCEDURE — 84560 ASSAY OF URINE/URIC ACID: CPT

## 2021-01-25 PROCEDURE — U0005: CPT

## 2021-01-25 PROCEDURE — 97116 GAIT TRAINING THERAPY: CPT

## 2021-01-25 PROCEDURE — U0003: CPT

## 2021-01-25 PROCEDURE — 99285 EMERGENCY DEPT VISIT HI MDM: CPT | Mod: 25

## 2021-01-25 PROCEDURE — 83935 ASSAY OF URINE OSMOLALITY: CPT

## 2021-01-25 PROCEDURE — 96375 TX/PRO/DX INJ NEW DRUG ADDON: CPT

## 2021-01-25 PROCEDURE — 84484 ASSAY OF TROPONIN QUANT: CPT

## 2021-01-25 PROCEDURE — 85730 THROMBOPLASTIN TIME PARTIAL: CPT

## 2021-01-25 PROCEDURE — 85610 PROTHROMBIN TIME: CPT

## 2021-01-25 PROCEDURE — 96374 THER/PROPH/DIAG INJ IV PUSH: CPT

## 2021-01-25 PROCEDURE — 97162 PT EVAL MOD COMPLEX 30 MIN: CPT

## 2021-01-25 RX ORDER — CHOLECALCIFEROL (VITAMIN D3) 125 MCG
1 CAPSULE ORAL
Qty: 0 | Refills: 0 | DISCHARGE

## 2021-01-25 RX ORDER — UBIDECARENONE 100 MG
0 CAPSULE ORAL
Qty: 0 | Refills: 0 | DISCHARGE

## 2021-01-25 RX ORDER — ALUMINUM ZIRCONIUM TRICHLOROHYDREX GLY 0.2 G/G
1 STICK TOPICAL
Qty: 0 | Refills: 0 | DISCHARGE

## 2021-01-25 RX ORDER — HYDRALAZINE HCL 50 MG
1 TABLET ORAL
Qty: 60 | Refills: 0
Start: 2021-01-25 | End: 2021-02-23

## 2021-01-25 RX ORDER — CALCIUM CARBONATE 500(1250)
1 TABLET ORAL
Qty: 30 | Refills: 0
Start: 2021-01-25

## 2021-01-25 RX ORDER — ALBUTEROL 90 UG/1
2 AEROSOL, METERED ORAL
Qty: 0 | Refills: 0 | DISCHARGE

## 2021-01-25 RX ORDER — OLOPATADINE HYDROCHLORIDE 1 MG/ML
1 SOLUTION/ DROPS OPHTHALMIC
Qty: 0 | Refills: 0 | DISCHARGE

## 2021-01-25 RX ORDER — PHENYLEPHRINE-SHARK LIVER OIL-MINERAL OIL-PETROLATUM RECTAL OINTMENT
1 OINTMENT (GRAM) RECTAL ONCE
Refills: 0 | Status: DISCONTINUED | OUTPATIENT
Start: 2021-01-25 | End: 2021-01-25

## 2021-01-25 RX ORDER — ISOSORBIDE MONONITRATE 60 MG/1
1 TABLET, EXTENDED RELEASE ORAL
Qty: 30 | Refills: 0
Start: 2021-01-25 | End: 2021-02-23

## 2021-01-25 RX ORDER — ICOSAPENT ETHYL 500 MG/1
2 CAPSULE, LIQUID FILLED ORAL
Qty: 0 | Refills: 0 | DISCHARGE

## 2021-01-25 RX ORDER — ZINC SULFATE TAB 220 MG (50 MG ZINC EQUIVALENT) 220 (50 ZN) MG
50 TAB ORAL
Qty: 0 | Refills: 0 | DISCHARGE

## 2021-01-25 RX ORDER — RAMIPRIL 5 MG
1 CAPSULE ORAL
Qty: 0 | Refills: 0 | DISCHARGE

## 2021-01-25 RX ORDER — ASCORBIC ACID 60 MG
1 TABLET,CHEWABLE ORAL
Qty: 0 | Refills: 0 | DISCHARGE

## 2021-01-25 RX ORDER — INSULIN LISPRO 100/ML
2 VIAL (ML) SUBCUTANEOUS
Qty: 0 | Refills: 0 | DISCHARGE

## 2021-01-25 RX ORDER — METOPROLOL TARTRATE 50 MG
1 TABLET ORAL
Qty: 30 | Refills: 0

## 2021-01-25 RX ORDER — MILK THISTLE 150 MG
0 CAPSULE ORAL
Qty: 0 | Refills: 0 | DISCHARGE

## 2021-01-25 RX ORDER — INSULIN LISPRO 100/ML
0 VIAL (ML) SUBCUTANEOUS
Qty: 0 | Refills: 0 | DISCHARGE
Start: 2021-01-25

## 2021-01-25 RX ORDER — METOPROLOL TARTRATE 50 MG
1 TABLET ORAL
Qty: 30 | Refills: 0
Start: 2021-01-25 | End: 2021-02-23

## 2021-01-25 RX ORDER — BUMETANIDE 0.25 MG/ML
1 INJECTION INTRAMUSCULAR; INTRAVENOUS
Qty: 30 | Refills: 0
Start: 2021-01-25 | End: 2021-02-23

## 2021-01-25 RX ORDER — SENNA PLUS 8.6 MG/1
2 TABLET ORAL
Qty: 60 | Refills: 0
Start: 2021-01-25 | End: 2021-02-23

## 2021-01-25 RX ADMIN — Medication 2: at 08:34

## 2021-01-25 RX ADMIN — HEPARIN SODIUM 5000 UNIT(S): 5000 INJECTION INTRAVENOUS; SUBCUTANEOUS at 05:12

## 2021-01-25 RX ADMIN — BUMETANIDE 1 MILLIGRAM(S): 0.25 INJECTION INTRAMUSCULAR; INTRAVENOUS at 18:02

## 2021-01-25 RX ADMIN — HEPARIN SODIUM 5000 UNIT(S): 5000 INJECTION INTRAVENOUS; SUBCUTANEOUS at 18:02

## 2021-01-25 RX ADMIN — Medication 4: at 13:15

## 2021-01-25 RX ADMIN — Medication 25 MILLIGRAM(S): at 05:12

## 2021-01-25 RX ADMIN — BUMETANIDE 1 MILLIGRAM(S): 0.25 INJECTION INTRAMUSCULAR; INTRAVENOUS at 05:12

## 2021-01-25 RX ADMIN — MILRINONE LACTATE 4 MICROGRAM(S)/KG/MIN: 1 INJECTION, SOLUTION INTRAVENOUS at 09:05

## 2021-01-25 RX ADMIN — Medication 25 MILLIGRAM(S): at 18:03

## 2021-01-25 RX ADMIN — ISOSORBIDE MONONITRATE 30 MILLIGRAM(S): 60 TABLET, EXTENDED RELEASE ORAL at 08:34

## 2021-01-25 RX ADMIN — MILRINONE LACTATE 4 MICROGRAM(S)/KG/MIN: 1 INJECTION, SOLUTION INTRAVENOUS at 01:26

## 2021-01-25 NOTE — PROGRESS NOTE ADULT - NUTRITIONAL ASSESSMENT
MEDICATIONS  (STANDING):  dextrose 40% Gel 15 Gram(s) Oral once  dextrose 5%. 1000 milliLiter(s) (50 mL/Hr) IV Continuous <Continuous>  dextrose 5%. 1000 milliLiter(s) (100 mL/Hr) IV Continuous <Continuous>  dextrose 50% Injectable 25 Gram(s) IV Push once  dextrose 50% Injectable 12.5 Gram(s) IV Push once  dextrose 50% Injectable 25 Gram(s) IV Push once  furosemide   Injectable 20 milliGRAM(s) IV Push every 12 hours  glucagon  Injectable 1 milliGRAM(s) IntraMuscular once  glucagon  Injectable 1 milliGRAM(s) IntraMuscular once  heparin   Injectable 5000 Unit(s) SubCutaneous every 12 hours  hydrALAZINE 25 milliGRAM(s) Oral two times a day  insulin lispro (ADMELOG) corrective regimen sliding scale   SubCutaneous three times a day before meals  insulin lispro (ADMELOG) corrective regimen sliding scale   SubCutaneous at bedtime  isosorbide   mononitrate ER Tablet (IMDUR) 30 milliGRAM(s) Oral daily  metoprolol succinate ER 25 milliGRAM(s) Oral daily  milrinone Infusion 0.125 MICROgram(s)/kG/Min (2.15 mL/Hr) IV Continuous <Continuous>  sodium chloride 1 Gram(s) Oral daily
MEDICATIONS  (STANDING):  dextrose 40% Gel 15 Gram(s) Oral once  dextrose 5%. 1000 milliLiter(s) (50 mL/Hr) IV Continuous <Continuous>  dextrose 5%. 1000 milliLiter(s) (100 mL/Hr) IV Continuous <Continuous>  dextrose 50% Injectable 25 Gram(s) IV Push once  dextrose 50% Injectable 12.5 Gram(s) IV Push once  dextrose 50% Injectable 25 Gram(s) IV Push once  furosemide   Injectable 20 milliGRAM(s) IV Push every 8 hours  glucagon  Injectable 1 milliGRAM(s) IntraMuscular once  heparin   Injectable 5000 Unit(s) SubCutaneous every 12 hours  hydrALAZINE 10 milliGRAM(s) Oral three times a day  insulin lispro (ADMELOG) corrective regimen sliding scale   SubCutaneous three times a day before meals  isosorbide   mononitrate ER Tablet (IMDUR) 30 milliGRAM(s) Oral daily  metoprolol succinate ER 25 milliGRAM(s) Oral daily  milrinone Infusion 0.125 MICROgram(s)/kG/Min (2.15 mL/Hr) IV Continuous <Continuous>  sodium chloride 1 Gram(s) Oral every 8 hours  sodium chloride 0.9%. 1000 milliLiter(s) (45 mL/Hr) IV Continuous <Continuous>
MEDICATIONS  (STANDING):  buMETAnide Injectable 1 milliGRAM(s) IV Push two times a day  dextrose 40% Gel 15 Gram(s) Oral once  dextrose 5%. 1000 milliLiter(s) (50 mL/Hr) IV Continuous <Continuous>  dextrose 5%. 1000 milliLiter(s) (100 mL/Hr) IV Continuous <Continuous>  dextrose 50% Injectable 25 Gram(s) IV Push once  dextrose 50% Injectable 12.5 Gram(s) IV Push once  dextrose 50% Injectable 25 Gram(s) IV Push once  glucagon  Injectable 1 milliGRAM(s) IntraMuscular once  glucagon  Injectable 1 milliGRAM(s) IntraMuscular once  heparin   Injectable 5000 Unit(s) SubCutaneous every 12 hours  hydrALAZINE 25 milliGRAM(s) Oral two times a day  insulin lispro (ADMELOG) corrective regimen sliding scale   SubCutaneous three times a day before meals  insulin lispro (ADMELOG) corrective regimen sliding scale   SubCutaneous at bedtime  isosorbide   mononitrate ER Tablet (IMDUR) 30 milliGRAM(s) Oral daily  metoprolol succinate ER 25 milliGRAM(s) Oral daily  milrinone Infusion 0.233 MICROgram(s)/kG/Min (4 mL/Hr) IV Continuous <Continuous>  senna 2 Tablet(s) Oral at bedtime
MEDICATIONS  (STANDING):  buMETAnide Injectable 1 milliGRAM(s) IV Push two times a day  dextrose 40% Gel 15 Gram(s) Oral once  dextrose 5%. 1000 milliLiter(s) (50 mL/Hr) IV Continuous <Continuous>  dextrose 5%. 1000 milliLiter(s) (100 mL/Hr) IV Continuous <Continuous>  dextrose 50% Injectable 25 Gram(s) IV Push once  dextrose 50% Injectable 12.5 Gram(s) IV Push once  dextrose 50% Injectable 25 Gram(s) IV Push once  glucagon  Injectable 1 milliGRAM(s) IntraMuscular once  glucagon  Injectable 1 milliGRAM(s) IntraMuscular once  heparin   Injectable 5000 Unit(s) SubCutaneous every 12 hours  hydrALAZINE 25 milliGRAM(s) Oral two times a day  insulin lispro (ADMELOG) corrective regimen sliding scale   SubCutaneous three times a day before meals  insulin lispro (ADMELOG) corrective regimen sliding scale   SubCutaneous at bedtime  isosorbide   mononitrate ER Tablet (IMDUR) 30 milliGRAM(s) Oral daily  metoprolol succinate ER 25 milliGRAM(s) Oral daily  milrinone Infusion 0.233 MICROgram(s)/kG/Min (4 mL/Hr) IV Continuous <Continuous>  senna 2 Tablet(s) Oral at bedtime
MEDICATIONS  (STANDING):  dextrose 40% Gel 15 Gram(s) Oral once  dextrose 5%. 1000 milliLiter(s) (50 mL/Hr) IV Continuous <Continuous>  dextrose 5%. 1000 milliLiter(s) (100 mL/Hr) IV Continuous <Continuous>  dextrose 50% Injectable 25 Gram(s) IV Push once  dextrose 50% Injectable 12.5 Gram(s) IV Push once  dextrose 50% Injectable 25 Gram(s) IV Push once  furosemide   Injectable 20 milliGRAM(s) IV Push every 12 hours  glucagon  Injectable 1 milliGRAM(s) IntraMuscular once  glucagon  Injectable 1 milliGRAM(s) IntraMuscular once  heparin   Injectable 5000 Unit(s) SubCutaneous every 12 hours  hydrALAZINE 25 milliGRAM(s) Oral two times a day  insulin lispro (ADMELOG) corrective regimen sliding scale   SubCutaneous three times a day before meals  insulin lispro (ADMELOG) corrective regimen sliding scale   SubCutaneous at bedtime  isosorbide   mononitrate ER Tablet (IMDUR) 30 milliGRAM(s) Oral daily  metoprolol succinate ER 25 milliGRAM(s) Oral daily  milrinone Infusion 0.125 MICROgram(s)/kG/Min (2.15 mL/Hr) IV Continuous <Continuous>  sodium chloride 1 Gram(s) Oral daily

## 2021-01-25 NOTE — PROGRESS NOTE ADULT - ASSESSMENT
M E D I C A L   A T T E N D I N G    F O L L O W    U P   N O T E                                     ------------------------------------------------------------------------------------------------    patient evaluated by me, case discussed with team, chart, medications, and physical exam reviewed, labs / tests  and vitals reviewed by me, as casey.   Patient is stable for discharge today.  appreciated all follow up: pt going home with hospice services  Patient to follow up with  hospice   See discharge document for full note.      ==================>> MEDICATIONS <<====================    buMETAnide Injectable 1 milliGRAM(s) IV Push two times a day  dextrose 40% Gel 15 Gram(s) Oral once  dextrose 5%. 1000 milliLiter(s) IV Continuous <Continuous>  dextrose 5%. 1000 milliLiter(s) IV Continuous <Continuous>  dextrose 50% Injectable 25 Gram(s) IV Push once  dextrose 50% Injectable 12.5 Gram(s) IV Push once  dextrose 50% Injectable 25 Gram(s) IV Push once  glucagon  Injectable 1 milliGRAM(s) IntraMuscular once  glucagon  Injectable 1 milliGRAM(s) IntraMuscular once  hemorrhoidal Ointment 1 Application(s) Rectal once  heparin   Injectable 5000 Unit(s) SubCutaneous every 12 hours  hydrALAZINE 25 milliGRAM(s) Oral two times a day  insulin lispro (ADMELOG) corrective regimen sliding scale   SubCutaneous three times a day before meals  insulin lispro (ADMELOG) corrective regimen sliding scale   SubCutaneous at bedtime  isosorbide   mononitrate ER Tablet (IMDUR) 30 milliGRAM(s) Oral daily  metoprolol succinate ER 25 milliGRAM(s) Oral daily  milrinone Infusion 0.233 MICROgram(s)/kG/Min IV Continuous <Continuous>  senna 2 Tablet(s) Oral at bedtime    MEDICATIONS  (PRN):  calcium carbonate    500 mG (Tums) Chewable 1 Tablet(s) Chew four times a day PRN Heartburn    ___________  Active diet:  Diet, Consistent Carbohydrate w/Evening Snack:   1000mL Fluid Restriction ([id number])  Supplement Feeding Modality:  Oral  Glucerna Shake Cans or Servings Per Day:  1       Frequency:  Daily  ___________________    ==================>> VITAL SIGNS <<==================    T(C): 36.6 (01-25-21 @ 12:14), Max: 36.9 (01-24-21 @ 21:08)  HR: 107 (01-25-21 @ 12:14) (100 - 109)  BP: 123/69 (01-25-21 @ 12:14) (100/69 - 123/69)  RR: 18 (01-25-21 @ 12:14) (18 - 18)  SpO2: 100% (01-25-21 @ 12:14) (100% - 100%)   CAPILLARY BLOOD GLUCOSE      POCT Blood Glucose.: 247 mg/dL (25 Jan 2021 12:52)  POCT Blood Glucose.: 162 mg/dL (25 Jan 2021 08:30)  POCT Blood Glucose.: 234 mg/dL (24 Jan 2021 21:30)  POCT Blood Glucose.: 147 mg/dL (24 Jan 2021 17:29)  I&O's Summary    24 Jan 2021 07:01  -  25 Jan 2021 07:00  --------------------------------------------------------  IN: 748 mL / OUT: 700 mL / NET: 48 mL    25 Jan 2021 07:01  -  25 Jan 2021 15:22  --------------------------------------------------------  IN: 600 mL / OUT: 400 mL / NET: 200 mL       ==================>> LAB AND IMAGING <<==================                        9.8    3.96  )-----------( 71       ( 25 Jan 2021 07:20 )             31.1        01-25    136  |  97  |  53<H>  ----------------------------<  137<H>  3.9   |  27  |  1.38<H>    Ca    9.2      25 Jan 2021 07:26  Mg     1.9     01-25    TPro  5.9<L>  /  Alb  3.3  /  TBili  1.4<H>  /  DBili  x   /  AST  51<H>  /  ALT  20  /  AlkPhos  58  01-24                 TSH:      4.73   (12-21-20)             HgA1C:   (12-21-20)          (12-21-20)      5.4    WBC count:   3.96 <<== ,  4.95 <<== ,  4.18 <<== ,  3.72 <<== ,  3.77 <<==   Hemoglobin:   9.8 <<==,  10.4 <<==,  10.5 <<==,  9.4 <<==,  10.8 <<==  platelets:  71 <==, 45 <==, 67 <==, 70 <==, 63 <==    Creatinine:  1.38  <<==, 1.18  <<==, 1.22  <<==, 1.25  <<==, 1.29  <<==, 1.31  <<==  Sodium:   136  <==, 133  <==, 131  <==, 131  <==, 130  <==, 129  <==, 122  <==       AST:          51 <==      ALT:        20  <==      AP:        58  <=     Bili:        1.4  <=

## 2021-01-25 NOTE — PROGRESS NOTE ADULT - PROBLEM SELECTOR PLAN 3
CHRONIC KIDNEY DISEASE, STAGE 3a:   Serum creatinine is stable at 1.25 .   There is no progression.  No uremic symptoms. No evidence of  worsening  Anemia. Fluid status stable.   Will continue to avoid nephrotoxic drugs.  Patient remains asymptomatic.  Continue current therapy.
CHRONIC KIDNEY DISEASE, STAGE 3a:   Serum creatinine is stable at 1.4 .   There is no progression.  No uremic symptoms. No evidence of  worsening  Anemia. Fluid status stable.   Will continue to avoid nephrotoxic drugs.  Patient remains asymptomatic.  Continue current therapy.
CHRONIC KIDNEY DISEASE, STAGE 3a:   Serum creatinine is stable at 1.25 .   There is no progression.  No uremic symptoms. No evidence of  worsening  Anemia. Fluid status stable.   Will continue to avoid nephrotoxic drugs.  Patient remains asymptomatic.  Continue current therapy.

## 2021-01-25 NOTE — CHART NOTE - NSCHARTNOTEFT_GEN_A_CORE
Patient family would like patient home with hospice today.  Discussed with cardiologist Dr. Uribe.  Plan for home to d/c milrinone gtt and patient to go home on standing BP meds.  Will continue with metoprolol and Imdur.  Decrease hydralazine to 10mg PO BID and start patient on Bumex 1mg PO daily.    Saundra Jean, ANP-C  [id number] Patient/family would like patient home TODAY with hospice.  Discussed with cardiologist Dr. Uribe.  Plan for home to d/c milrinone gtt and patient to go home on standing BP meds.  Will continue with metoprolol and Imdur.  Decrease hydralazine to 10mg PO BID and start patient on Bumex 1mg PO daily.    Saundra Jean, ANP-C  [id number]

## 2021-01-25 NOTE — PROGRESS NOTE ADULT - SUBJECTIVE AND OBJECTIVE BOX
CARDIOLOGY     PROGRESS  NOTE   ________________________________________________    CHIEF COMPLAINT:Patient is a 89y old  Female who presents with a chief complaint of shortness of breath (24 Jan 2021 15:40)  doing better.  	  REVIEW OF SYSTEMS:  CONSTITUTIONAL: No fever, weight loss, or fatigue  EYES: No eye pain, visual disturbances, or discharge  ENT:  No difficulty hearing, tinnitus, vertigo; No sinus or throat pain  NECK: No pain or stiffness  RESPIRATORY: No cough, wheezing, chills or hemoptysis; decreaswe Shortness of Breath  CARDIOVASCULAR: No chest pain, palpitations, passing out, dizziness, or leg swelling  GASTROINTESTINAL: No abdominal or epigastric pain. No nausea, vomiting, or hematemesis; No diarrhea or constipation. No melena or hematochezia.  GENITOURINARY: No dysuria, frequency, hematuria, or incontinence  NEUROLOGICAL: No headaches, memory loss, loss of strength, numbness, or tremors  SKIN: No itching, burning, rashes, or lesions   LYMPH Nodes: No enlarged glands  ENDOCRINE: No heat or cold intolerance; No hair loss  MUSCULOSKELETAL: No joint pain or swelling; No muscle, back, or extremity pain  PSYCHIATRIC: No depression, anxiety, mood swings, or difficulty sleeping  HEME/LYMPH: No easy bruising, or bleeding gums  ALLERGY AND IMMUNOLOGIC: No hives or eczema	    [ ] All others negative	  [ ] Unable to obtain    PHYSICAL EXAM:  T(C): 36.8 (01-25-21 @ 04:10), Max: 37 (01-24-21 @ 11:29)  HR: 109 (01-25-21 @ 04:10) (100 - 109)  BP: 119/69 (01-25-21 @ 04:10) (100/69 - 119/69)  RR: 18 (01-25-21 @ 04:10) (18 - 18)  SpO2: 100% (01-25-21 @ 04:10) (100% - 100%)  Wt(kg): --  I&O's Summary    24 Jan 2021 07:01  -  25 Jan 2021 07:00  --------------------------------------------------------  IN: 748 mL / OUT: 700 mL / NET: 48 mL        Appearance: Normal	  HEENT:   Normal oral mucosa, PERRL, EOMI	  Lymphatic: No lymphadenopathy  Cardiovascular: Normal S1 S2, No JVD, + murmurs, No edema  Respiratory: Lungs clear to auscultation	  Psychiatry: A & O x 3, Mood & affect appropriate  Gastrointestinal:  Soft, Non-tender, + BS	  Skin: No rashes, No ecchymoses, No cyanosis	  Neurologic: Non-focal  Extremities: Normal range of motion, No clubbing, cyanosis or edema  Vascular: Peripheral pulses palpable 2+ bilaterally    MEDICATIONS  (STANDING):  buMETAnide Injectable 1 milliGRAM(s) IV Push two times a day  dextrose 40% Gel 15 Gram(s) Oral once  dextrose 5%. 1000 milliLiter(s) (50 mL/Hr) IV Continuous <Continuous>  dextrose 5%. 1000 milliLiter(s) (100 mL/Hr) IV Continuous <Continuous>  dextrose 50% Injectable 25 Gram(s) IV Push once  dextrose 50% Injectable 12.5 Gram(s) IV Push once  dextrose 50% Injectable 25 Gram(s) IV Push once  glucagon  Injectable 1 milliGRAM(s) IntraMuscular once  glucagon  Injectable 1 milliGRAM(s) IntraMuscular once  hemorrhoidal Ointment 1 Application(s) Rectal once  heparin   Injectable 5000 Unit(s) SubCutaneous every 12 hours  hydrALAZINE 25 milliGRAM(s) Oral two times a day  insulin lispro (ADMELOG) corrective regimen sliding scale   SubCutaneous three times a day before meals  insulin lispro (ADMELOG) corrective regimen sliding scale   SubCutaneous at bedtime  isosorbide   mononitrate ER Tablet (IMDUR) 30 milliGRAM(s) Oral daily  metoprolol succinate ER 25 milliGRAM(s) Oral daily  milrinone Infusion 0.233 MICROgram(s)/kG/Min (4 mL/Hr) IV Continuous <Continuous>  senna 2 Tablet(s) Oral at bedtime      TELEMETRY: 	    ECG:  	  RADIOLOGY:  OTHER: 	  	  LABS:	 	    CARDIAC MARKERS:                                10.4   4.95  )-----------( 45       ( 24 Jan 2021 07:18 )             33.4     01-24    133<L>  |  97  |  52<H>  ----------------------------<  126<H>  4.7   |  22  |  1.18    Ca    9.5      24 Jan 2021 07:17  Mg     2.1     01-24    TPro  5.9<L>  /  Alb  3.3  /  TBili  1.4<H>  /  DBili  x   /  AST  51<H>  /  ALT  20  /  AlkPhos  58  01-24    proBNP: Serum Pro-Brain Natriuretic Peptide: 31553 pg/mL (01-23 @ 15:00)  Serum Pro-Brain Natriuretic Peptide: 79830 pg/mL (01-19 @ 12:59)    Lipid Profile:   HgA1c:   TSH:         Assessment and plan  ---------------------------  89F with h/o HTN, HLD, DM2, CKD III, HFrEF w/ severely reduced LVEF(20-25%),  severe MR, and severe pHTN who was brought in by family on account of worsening dyspnea. Per daughter pt is usually short of breath at baseline but this morning was noted to be worse and appeared to be in respiratory distress. Pt also c/o chest pressure. She also reports worsening LE swelling, recent weight gain, abd distension and extreme fatigue. She  denies fever/chills, n/v, cough.  pt with hx of chf/cardiomyopathy with hyponatremia and sob  pt with hyponatremia ?sec to fluid overload, improving  iv lasix  tsh  will review old echo  dvt prophylaxis  will consider pos inotrope ??  fu lytes closely  continue  on milrinone drip, fu i/o  fu sodium level  hr was well controlled over night  continue diuresis  will add hydralazine and nitrate, increase as tolerated  decrease fluid intake  prognosis is poor  i am not sure the I/O documented is correct  increase milrinone drip  check lytes  change lasix to bumex  dc sodium tanlet  i don't know how accurate is the I/O

## 2021-01-25 NOTE — DIETITIAN INITIAL EVALUATION ADULT. - PROBLEM SELECTOR PLAN 7
Pt is DNR/DNI  Family was offered Home Hospice services at last admission (Dec 2020) but they declined.

## 2021-01-25 NOTE — DISCHARGE NOTE PROVIDER - NSDCCAREPROVSEEN_GEN_ALL_CORE_FT
Maya Diaz Hoorbod Delshadfar Hoorbod Delshadfar Hoorbod Delshadfar Faraidoon Golyan Samuel Anandan Evan Choi Moh[facility]itney Schumacherssica Abhilash Azul  [facility]  Annabel Calderon  [facility] PracticeTeam [facility]  Team [facility]

## 2021-01-25 NOTE — DIETITIAN INITIAL EVALUATION ADULT. - PROBLEM SELECTOR PLAN 4
- pt with hyponatremia at baseline , Na 120 ; likely hypervolemic  - c/w diuresis  - 1L Fluid restriction  - monitor BMP q 12  - consider Nephrology consult

## 2021-01-25 NOTE — DIETITIAN INITIAL EVALUATION ADULT. - OTHER INFO
Patient observed eating breakfast during encounter. Patient reports she is trying to eat but feels unwell. RN reports patient with ~50% PO intake. 2 unopened Glucerna supplements observed at bedside, discussed with RN importance of encouraging supplement intake. RN reports patient with no known difficulty chewing/swallowing, no GI distress (nausea/vomiting/diarrhea/constipation), last BM this morning.     Per daughter, patient with recent weight gain likely 2/2 to fluid retention in setting of heart failure exacerbation. Daughter reports patient's usual weight fluctuates between 114-124lbs. Daily weights this admission of 122.5lbs (01-25), 124.5lbs (01-24), 124.1lbs (01-23), 126.7lbs (01-22), 126.3lbs (01-21) indicate weight loss likely 2/2 diuresis.     Patient with history of Type 2 Diabetes. Per H&P, patient on Glipizide and Tradjenta at home; however, per discussion with daughter, patient recently transitioned to basal insulin. Daughter reports patient's morning fingersticks usually ~140mg/dL, post prandial usually 160-190mg/dL. Patient with an A1c of 5.7% (12-) indicates good glycemic control.      Answered daughter's questions regarding patient's therapeutic restrictions; encouraged importance of adequate PO intake, monitoring fluid and salt intake, and encouraged consumption of fruits, vegetables and whole grains. Patient observed eating breakfast during encounter. Patient reports she is trying to eat but feels unwell. RN reports patient with ~50% PO intake. 2 unopened Glucerna supplements observed at bedside, discussed with RN importance of encouraging supplement intake. RN reports patient with no known difficulty chewing/swallowing, no GI distress (nausea/vomiting/diarrhea/constipation), last BM this morning.     Per daughter, patient with recent weight gain likely 2/2 to fluid retention in setting of heart failure exacerbation. Daughter reports patient's usual weight fluctuates between 114-124lbs. Daily weights this admission of 122.5lbs (01-25), 124.5lbs (01-24), 124.1lbs (01-23), 126.7lbs (01-22), 126.3lbs (01-21) indicate weight loss likely 2/2 diuresis.     Patient with history of Type 2 Diabetes. Per H&P, patient on Glipizide and Tradjenta at home; however, per discussion with daughter, patient recently transitioned to basal insulin. Daughter reports patient's morning fingersticks usually ~140mg/dL, post prandial usually 160-190mg/dL. Patient with an A1c of 5.7% (12-) indicates good glycemic control.      Patient's daughter previously educated on fluid restriction for CHF. Answered daughter's questions regarding patient's therapeutic restrictions; encouraged importance of adequate PO intake, monitoring fluid and salt intake, and encouraged consumption of fruits, vegetables and whole grains.

## 2021-01-25 NOTE — CONSULT NOTE ADULT - ASSESSMENT
89F with h/o HTN, HLD, DM2, CKD III, HFrEF w/ severely reduced LVEF(20-25%),  severe MR, and severe pHTN who was brought in by family on account of worsening dyspnea. Per daughter pt is usually short of breath at baseline but this morning was noted to be worse and appeared to be in respiratory distress. Pt also c/o chest pressure. She also reports worsening LE swelling, recent weight gain, abd distension and extreme fatigue. She  denies fever/chills, n/v, cough.    
89F with PMH including HTN, HLD, DM2, CKD3, HFrEF (EF 20-25%), severe MR, and severe pHTN here with worsening dyspnea. Patient was seen by palliative on last admission, when she was admitted for hyponatremia. MOLST (DNR/DNI) were completed at the time, with plan for home with hospice ultimately not feasible as family wanted to continue procrit injections. Now being managed for acute HFrEF exacerbation, on lasix and milrinone. Also noted to have hyponatremia, which is improving. Palliative care consulted for assistance with GOC.
89F with h/o HTN, HLD, DM2, CKD III, HFrEF w/ severely reduced LVEF(20-25%),  severe MR, and severe pHTN who was brought in by family on account of worsening dyspnea. Per daughter pt is usually short of breath at baseline but this morning was noted to be worse and appeared to be in respiratory distress. Pt also c/o chest pressure. She also reports worsening LE swelling, recent weight gain, abd distension and extreme fatigue. She  denies fever/chills, n/v, cough.  pt with hx of chf/cardiomyopathy with hyponatremia and sob  pt with hyponatremia ?sec to fluid overload  iv lasix  renal eval will  l adjust cardiac meds  tsh  will review old echo  dvt prophylaxis  will consider pos inotrope ??  fu zenon closely

## 2021-01-25 NOTE — PROGRESS NOTE ADULT - PROBLEM SELECTOR PLAN 1
is improving
is improving
will check ua , urine osmolality , urine sodium , urine uric acid , serum sodium , serum osmolality , serum uric acid , f/u with hyponatremia work up , f/u with bmp , monitor i and o  start salt tablet

## 2021-01-25 NOTE — PROGRESS NOTE ADULT - PROBLEM SELECTOR PROBLEM 3
Stage 3 chronic kidney disease, unspecified whether stage 3a or 3b CKD

## 2021-01-25 NOTE — PROGRESS NOTE ADULT - PROBLEM SELECTOR PLAN 2
f/u I and o
increase lasix 20 iv q 12 h r
increase lasix 20 iv q 12 h r
increase lasix 20 iv q h r
f/u I and o

## 2021-01-25 NOTE — PROGRESS NOTE ADULT - PROVIDER SPECIALTY LIST ADULT
Cardiology
Cardiology
Internal Medicine
Nephrology
Cardiology
Internal Medicine
Cardiology
Internal Medicine
Nephrology

## 2021-01-25 NOTE — CONSULT NOTE ADULT - PROBLEM SELECTOR RECOMMENDATION 3
CHRONIC KIDNEY DISEASE, STAGE 3:   Serum creatinine is 1.43, approximating a GFR is controlled   There is no progression.  No uremic symptoms. No evidence of  worsening  Anemia. Fluid status stable.   Will continue to avoid nephrotoxic drugs.  Patient remains asymptomatic.  Continue current therapy.
- patient is DNR/DNI, MOLST completed on prior admission  - d/w daughter and grandson; they have been open to hospice prior to admission, and are in contact with [facility] to sign consents and start home hospice services  - disposition plan home with hospice

## 2021-01-25 NOTE — DIETITIAN INITIAL EVALUATION ADULT. - ORAL NUTRITION SUPPLEMENTS
2) Continue supplementation with Glucerna Therapeutic Nutrition 1x daily (provides 220kcal and 10g protein)

## 2021-01-25 NOTE — DISCHARGE NOTE PROVIDER - HOSPITAL COURSE
89F with h/o HTN, HLD, DM2, CKD III, HFrEF w/ severely reduced LVEF (20-25%), severe MR, and severe pHTN who was brought in by family on account of  dyspnea. Noted with decreased breath sounds b/l and b/l LE pitting edema. Labs notable for markedly elevated bnp, hyperkalemia, hyponatremia and thrombocytopenia. Admitted for acute on chronic systolic heart failure.  Bnp markedly elevated at [id number] on admission   Diuresed with lasix then switched to Bumex, On Milrinone per cardiology.  Also with stage 3 CKD continuing to monitor renal function and avoiding nephrotoxic medications.         89F with h/o HTN, HLD, DM2, CKD III, HFrEF w/ severely reduced LVEF (20-25%), severe MR, and severe pHTN who was brought in by family on account of  dyspnea. Noted with decreased breath sounds b/l and b/l LE pitting edema. Labs notable for markedly elevated bnp, hyperkalemia, hyponatremia and thrombocytopenia. Admitted for acute on chronic systolic heart failure.  Bnp markedly elevated at 57[address] on admission   Diuresed with lasix then switched to Bumex, on Milrinone per cardiology.  Also with stage 3 CKD continuing to monitor renal function and avoiding nephrotoxic medications.  Patient and family ultimately have agreed to home with hospice considering the chronicity of the patients medical status.  Family requesting d/c today with hospice.  Patient to be d/c'd home on hydralazine, bumex, Imdur and toprol xl.  Care as per hospice.

## 2021-01-25 NOTE — DISCHARGE NOTE NURSING/CASE MANAGEMENT/SOCIAL WORK - PATIENT PORTAL LINK FT
You can access the FollowMyHealth Patient Portal offered by [facility] by registering at the following website: [email or web address]. By joining [facility]’s FollowMyHealth portal, you will also be able to view your health information using other applications (apps) compatible with our system.

## 2021-01-25 NOTE — DISCHARGE NOTE PROVIDER - NSDCCPCAREPLAN_GEN_ALL_CORE_FT
PRINCIPAL DISCHARGE DIAGNOSIS  Diagnosis: CHF exacerbation  Assessment and Plan of Treatment: Care as per hospice.  Continue with hydralazine, Imdur, Toprol XL and bumex.      SECONDARY DISCHARGE DIAGNOSES  Diagnosis: Type 2 diabetes mellitus with other specified complication, without long-term current use of insulin  Assessment and Plan of Treatment: As per family patient was not on PO medications recently.  Continue with sliding scale at this time.    Diagnosis: Chronic kidney disease (CKD), stage IV (severe)  Assessment and Plan of Treatment: At baseline.  Avoid nephrotoxic agents.    Diagnosis: Hyponatremia  Assessment and Plan of Treatment: Resolved with 1L fluid restriction.

## 2021-01-25 NOTE — DISCHARGE NOTE PROVIDER - NSDCMRMEDTOKEN_GEN_ALL_CORE_FT
Albuterol (Eqv-ProAir HFA) 90 mcg/inh inhalation aerosol: 2 puff(s) inhaled once a day, As Needed  Align 4 mg oral capsule: 1 cap(s) orally once a day  calcium + vitamin d3:   CoQ10:   cranberry oral tablet:   HumaLOG 100 units/mL subcutaneous solution: 2 to 4 unit(s) subcutaneous 3 times a day, As Needed  metoprolol succinate 25 mg oral tablet, extended release: 1 tab(s) orally once a day  Nephplex Rx oral tablet: 1 tab(s) orally once a day  olopatadine 0.2% ophthalmic solution: 1 drop(s) in each eye once a day, As Needed  ramipril 2.5 mg oral capsule: 1 cap(s) orally once a day  torsemide 10 mg oral tablet: 1 tab(s) orally 2 times a day as needed  1/18 pt took 10mg  1/17 pt took 20mg  turmeric:   Vascepa 1 g oral capsule: 2 cap(s) orally 2 times a day  Vitamin C 500 mg oral tablet: 1 tab(s) orally once a day  Vitamin D3 5000 intl units (125 mcg) oral capsule: 1 cap(s) orally every other day  Zinc: 50 milligram(s) orally once a day   bumetanide 1 mg oral tablet: 1 tab(s) orally once a day   calcium carbonate 500 mg (200 mg elemental calcium) oral tablet, chewable: 1 tab(s) orally 4 times a day, As needed, Heartburn  hydrALAZINE 10 mg oral tablet: 1 tab(s) orally 2 times a day   insulin lispro 100 units/mL injectable solution: subcutaneous 2 times a day (before meals)   2 Unit(s) if Glucose 151 - 200  4 Unit(s) if Glucose 201 - 250  6 Unit(s) if Glucose 251 - 300  8 Unit(s) if Glucose 301 - 350  10 Unit(s) if Glucose 351 - 400  12 Unit(s) if Glucose Greater Than 400  insulin lispro 100 units/mL injectable solution: subcutaneous once a day (at bedtime)  0 Unit(s) if Glucose 0 - 250  1 Unit(s) if Glucose 251 - 300  2 Unit(s) if Glucose 301 - 350  3 Unit(s) if Glucose 351 - 400  4 Unit(s) if Glucose Greater Than 400  isosorbide mononitrate 30 mg oral tablet, extended release: 1 tab(s) orally once a day  metoprolol succinate 25 mg oral tablet, extended release: 1 tab(s) orally once a day  senna oral tablet: 2 tab(s) orally once a day (at bedtime)

## 2021-01-25 NOTE — CONSULT NOTE ADULT - SUBJECTIVE AND OBJECTIVE BOX
HPI: 89F with PMH including HTN, HLD, DM2, CKD3, HFrEF (EF 20-25%), severe MR, and severe pHTN here with worsening dyspnea. Patient was seen by palliative on last admission, when she was admitted for hyponatremia. MOLST (DNR/DNI) were completed at the time, with plan for home with hospice ultimately not feasible as family wanted to continue procrit injections. Now being managed for acute HFrEF exacerbation, on lasix and milrinone. Also noted to have hyponatremia, which is improving. Palliative care consulted for assistance with GOC.      PERTINENT PM/SXH:   DM (diabetes mellitus)    CHF (congestive heart failure)      No significant past surgical history      FAMILY HISTORY:  No pertinent family history in first degree relatives      ITEMS NOT CHECKED ARE NOT PRESENT    SOCIAL HISTORY:   Significant other/partner[ ]  Children[ ]  Anglican/Spirituality:  Substance hx:  [ ]   Tobacco hx:  [ ]   Alcohol hx: [ ]   Home Opioid hx:  [ ] I-Stop Reference No:  Living Situation: [ X]Home with family  [ ]Long term care  [ ]Rehab [ ]Other  Home Services: [X] [facility] 5hours  x 7days via [facility] [ ] Visting RN [ ] Hospice    ADVANCE DIRECTIVES:    DNR  Yes    MOLST  [ ]  Living Will  [ ]   DECISION MAKER(s):  [ ] Health Care Proxy(s)  [ ] Surrogate(s)  [ ] Guardian           Name(s): Phone Number(s):    BASELINE (I)ADL(s) (prior to admission):  [place]: [ ]Total  [ ] Moderate [ ]Dependent    Allergies    No Known Allergies    Intolerances    MEDICATIONS  (STANDING):  buMETAnide Injectable 1 milliGRAM(s) IV Push two times a day  dextrose 40% Gel 15 Gram(s) Oral once  dextrose 5%. 1000 milliLiter(s) (50 mL/Hr) IV Continuous <Continuous>  dextrose 5%. 1000 milliLiter(s) (100 mL/Hr) IV Continuous <Continuous>  dextrose 50% Injectable 25 Gram(s) IV Push once  dextrose 50% Injectable 12.5 Gram(s) IV Push once  dextrose 50% Injectable 25 Gram(s) IV Push once  glucagon  Injectable 1 milliGRAM(s) IntraMuscular once  glucagon  Injectable 1 milliGRAM(s) IntraMuscular once  hemorrhoidal Ointment 1 Application(s) Rectal once  heparin   Injectable 5000 Unit(s) SubCutaneous every 12 hours  hydrALAZINE 25 milliGRAM(s) Oral two times a day  insulin lispro (ADMELOG) corrective regimen sliding scale   SubCutaneous three times a day before meals  insulin lispro (ADMELOG) corrective regimen sliding scale   SubCutaneous at bedtime  isosorbide   mononitrate ER Tablet (IMDUR) 30 milliGRAM(s) Oral daily  metoprolol succinate ER 25 milliGRAM(s) Oral daily  milrinone Infusion 0.233 MICROgram(s)/kG/Min (4 mL/Hr) IV Continuous <Continuous>  senna 2 Tablet(s) Oral at bedtime    MEDICATIONS  (PRN):  calcium carbonate    500 mG (Tums) Chewable 1 Tablet(s) Chew four times a day PRN Heartburn    PRESENT SYMPTOMS: [ ]Unable to obtain due to poor mentation   Source if other than patient:  [ ]Family   [ ]Team     Pain: [ ]yes [ ]no  QOL impact -   Location -                    Aggravating factors -  Quality -  Radiation -  Timing-  Severity (0-10 scale):  Minimal acceptable level (0-10 scale):     CPOT:    https://www.[facility].org/getattachment/[email or web address]/Critical-Care-Pain-Observation-Tool-(CPOT)      PAIN AD Score:     http://geriatrictoolkit.[email or web address].[email or web address]/cog/painad.pdf (press ctrl +  left click to view)    Dyspnea:                           [ ]Mild [ ]Moderate [ ]Severe  Anxiety:                             [ ]Mild [ ]Moderate [ ]Severe  Fatigue:                             [ ]Mild [ ]Moderate [ ]Severe  Nausea:                             [ ]Mild [ ]Moderate [ ]Severe  Loss of appetite:              [ ]Mild [ ]Moderate [ ]Severe  Constipation:                    [ ]Mild [ ]Moderate [ ]Severe    Other Symptoms:  [ ]All other review of systems negative     Palliative Performance Status Version 2:         %    http://npcrc.org/files/news/palliative_performance_scale_ppsv2.pdf  PHYSICAL EXAM:  Vital Signs Last 24 Hrs  T(C): 36.8 (25 Jan 2021 04:10), Max: 37 (24 Jan 2021 11:29)  T(F): 98.3 (25 Jan 2021 04:10), Max: 98.6 (24 Jan 2021 11:29)  HR: 109 (25 Jan 2021 04:10) (100 - 109)  BP: 119/69 (25 Jan 2021 04:10) (100/69 - 119/69)  BP(mean): --  RR: 18 (25 Jan 2021 04:10) (18 - 18)  SpO2: 100% (25 Jan 2021 04:10) (100% - 100%) I&O's Summary    24 Jan 2021 07:01  -  25 Jan 2021 07:00  --------------------------------------------------------  IN: 748 mL / OUT: 700 mL / NET: 48 mL      GENERAL:  [ ]Alert  [ ]Oriented x   [ ]Lethargic  [ ]Cachexia  [ ]Unarousable  [ ]Verbal  [ ]Non-Verbal  Behavioral:   [ ] Anxiety  [ ] Delirium [ ] Agitation [ ] Other  HEENT:  [ ]Normal   [ ]Dry mouth   [ ]ET Tube/Trach  [ ]Oral lesions  PULMONARY:   [ ]Clear [ ]Tachypnea  [ ]Audible excessive secretions   [ ]Rhonchi        [ ]Right [ ]Left [ ]Bilateral  [ ]Crackles        [ ]Right [ ]Left [ ]Bilateral  [ ]Wheezing     [ ]Right [ ]Left [ ]Bilateral  [ ]Diminished breath sounds [ ]right [ ]left [ ]bilateral  CARDIOVASCULAR:    [ ]Regular [ ]Irregular [ ]Tachy  [ ]Theodore [ ]Murmur [ ]Other  GASTROINTESTINAL:  [ ]Soft  [ ]Distended   [ ]+BS  [ ]Non tender [ ]Tender  [ ]PEG [ ]OGT/ NGT  Last BM:     GENITOURINARY:  [ ]Normal [ ] Incontinent   [ ]Oliguria/Anuria   [ ]Alejo  MUSCULOSKELETAL:   [ ]Normal   [ ]Weakness  [ ]Bed/Wheelchair bound [ ]Edema  NEUROLOGIC:   [ ]No focal deficits  [ ]Cognitive impairment  [ ]Dysphagia [ ]Dysarthria [ ]Paresis [ ]Other   SKIN:   [ ]Normal    [ ]Rash  [ ]Pressure ulcer(s)       Present on admission [ ]y [ ]n    CRITICAL CARE:  [ ] Shock Present  [ ]Septic [ ]Cardiogenic [ ]Neurologic [ ]Hypovolemic  [ ]  Vasopressors [ ]  Inotropes   [ ]Respiratory failure present [ ]Mechanical ventilation [ ]Non-invasive ventilatory support [ ]High flow  [ ]Acute  [ ]Chronic [ ]Hypoxic  [ ]Hypercarbic [ ]Other  [ ]Other organ failure     LABS: reviewed                        9.8    3.96  )-----------( 71       ( 25 Jan 2021 07:20 )             31.1   01-25    136  |  97  |  53<H>  ----------------------------<  137<H>  3.9   |  27  |  1.38<H>    Ca    9.2      25 Jan 2021 07:26  Mg     1.9     01-25    TPro  5.9<L>  /  Alb  3.3  /  TBili  1.4<H>  /  DBili  x   /  AST  51<H>  /  ALT  20  /  AlkPhos  58  01-24        RADIOLOGY & ADDITIONAL STUDIES:    X-ray abdomen 1/20/21  Nonobstructive bowel gas pattern.    PROTEIN CALORIE MALNUTRITION PRESENT: [ ]mild [ ]moderate [ ]severe [ ]underweight [ ]morbid obesity  https://www.andeal.org/vault/2440/web/files/ONC/Table_Clinical%20Characteristics%20to%20Document%20Malnutrition-White%20JV%20et%20al%202012.pdf    Height (cm): 157.5 (01-19-21 @ 12:33), 157.5 (12-20-20 @ 10:29)  Weight (kg): 57.3 (01-20-21 @ 08:40), 49.9 (12-20-20 @ 10:29)  BMI (kg/m2): 23.1 (01-20-21 @ 08:40), 20.1 (01-19-21 @ 12:33), 20.1 (12-20-20 @ 10:29)    [ ]PPSV2 < or = to 30% [ ]significant weight loss  [ ]poor nutritional intake  [ ]anasarca     Albumin, Serum: 3.3 g/dL (01-24-21 @ 07:17)   [ ]Artificial Nutrition      REFERRALS:   [ ]Chaplaincy  [ ]Hospice  [ ]Child Life  [ ]Social Work  [ ]Case management [ ]Holistic Therapy     Goals of Care Document:     ______________  Sanket Paul MD   of Geriatric and Palliative Medicine  [facility]     Please page the following number for clinical matters between the hours of 9AM and 5PM   from Monday through Friday : [phone]    After 5PM and on weekends, please page: [phone]. The Geriatric and Palliative Medicine consult service has 24/7 coverage for medical recommendations, including for symptom management needs. HPI: 89F with PMH including HTN, HLD, DM2, CKD3, HFrEF (EF 20-25%), severe MR, and severe pHTN here with worsening dyspnea. Patient was seen by palliative on last admission, when she was admitted for hyponatremia. MOLST (DNR/DNI) were completed at the time, with plan for home with hospice ultimately not feasible as family wanted to continue procrit injections. Now being managed for acute HFrEF exacerbation, on lasix and milrinone. Also noted to have hyponatremia, which is improving. Palliative care consulted for assistance with GOC.      PERTINENT PM/SXH:   DM (diabetes mellitus)    CHF (congestive heart failure)      No significant past surgical history      FAMILY HISTORY:  No pertinent family history in first degree relatives      ITEMS NOT CHECKED ARE NOT PRESENT    SOCIAL HISTORY:   Significant other/partner[ ]  Children[X ]  Amish/Spirituality:  Substance hx:  [ ]   Tobacco hx:  [ ]   Alcohol hx: [ ]   Home Opioid hx:  [ ] I-Stop Reference No: [id number]  Living Situation: [ X]Home with family  [ ]Long term care  [ ]Rehab [ ]Other  Home Services: [X] [facility] 5hours  x 7days via [facility] [ ] Visting RN [ ] Hospice    ADVANCE DIRECTIVES:    DNR  Yes    MOLST  [ ]  Living Will  [ ]   DECISION MAKER(s):  [ ] Health Care Proxy(s)  [ ] Surrogate(s)  [ ] Guardian           Name(s): Phone Number(s): daughter Eve    BASELINE (I)ADL(s) (prior to admission):  [place]: [ ]Total  [ ] Moderate [ ]Dependent    Allergies    No Known Allergies    Intolerances    MEDICATIONS  (STANDING):  buMETAnide Injectable 1 milliGRAM(s) IV Push two times a day  dextrose 40% Gel 15 Gram(s) Oral once  dextrose 5%. 1000 milliLiter(s) (50 mL/Hr) IV Continuous <Continuous>  dextrose 5%. 1000 milliLiter(s) (100 mL/Hr) IV Continuous <Continuous>  dextrose 50% Injectable 25 Gram(s) IV Push once  dextrose 50% Injectable 12.5 Gram(s) IV Push once  dextrose 50% Injectable 25 Gram(s) IV Push once  glucagon  Injectable 1 milliGRAM(s) IntraMuscular once  glucagon  Injectable 1 milliGRAM(s) IntraMuscular once  hemorrhoidal Ointment 1 Application(s) Rectal once  heparin   Injectable 5000 Unit(s) SubCutaneous every 12 hours  hydrALAZINE 25 milliGRAM(s) Oral two times a day  insulin lispro (ADMELOG) corrective regimen sliding scale   SubCutaneous three times a day before meals  insulin lispro (ADMELOG) corrective regimen sliding scale   SubCutaneous at bedtime  isosorbide   mononitrate ER Tablet (IMDUR) 30 milliGRAM(s) Oral daily  metoprolol succinate ER 25 milliGRAM(s) Oral daily  milrinone Infusion 0.233 MICROgram(s)/kG/Min (4 mL/Hr) IV Continuous <Continuous>  senna 2 Tablet(s) Oral at bedtime    MEDICATIONS  (PRN):  calcium carbonate    500 mG (Tums) Chewable 1 Tablet(s) Chew four times a day PRN Heartburn    PRESENT SYMPTOMS: [X ]Unable to obtain due to poor mentation   Source if other than patient:  [ ]Family   [ ]Team     Pain: [ ]yes [ ]no  QOL impact -   Location -                    Aggravating factors -  Quality -  Radiation -  Timing-  Severity (0-10 scale):  Minimal acceptable level (0-10 scale):     CPOT:    https://www.[facility].org/getattachment/[email or web address]/Critical-Care-Pain-Observation-Tool-(CPOT)      PAIN AD Score: 0    http://geriatrictoolkit.[place].[facility]/cog/painad.pdf (press ctrl +  left click to view)    Dyspnea:                           [ ]Mild [ ]Moderate [ ]Severe  Anxiety:                             [ ]Mild [ ]Moderate [ ]Severe  Fatigue:                             [ ]Mild [ ]Moderate [ ]Severe  Nausea:                             [ ]Mild [ ]Moderate [ ]Severe  Loss of appetite:              [ ]Mild [ ]Moderate [ ]Severe  Constipation:                    [ ]Mild [ ]Moderate [ ]Severe    Other Symptoms:  [ ]All other review of systems negative     Palliative Performance Status Version 2:         %    http://npcrc.org/files/news/palliative_performance_scale_ppsv2.pdf  PHYSICAL EXAM:  Vital Signs Last 24 Hrs  T(C): 36.8 (25 Jan 2021 04:10), Max: 37 (24 Jan 2021 11:29)  T(F): 98.3 (25 Jan 2021 04:10), Max: 98.6 (24 Jan 2021 11:29)  HR: 109 (25 Jan 2021 04:10) (100 - 109)  BP: 119/69 (25 Jan 2021 04:10) (100/69 - 119/69)  BP(mean): --  RR: 18 (25 Jan 2021 04:10) (18 - 18)  SpO2: 100% (25 Jan 2021 04:10) (100% - 100%) I&O's Summary    24 Jan 2021 07:01  -  25 Jan 2021 07:00  --------------------------------------------------------  IN: 748 mL / OUT: 700 mL / NET: 48 mL    GENERAL:   [X ]Alert  [ ]Oriented x   [ ]Lethargic  [ ]Cachexia  [ ]Unarousable  [ X]Verbal  [ ]Non-Verbal  Behavioral:  Limited exam for patient safety and to limit infection risk during COVID-19 outbreak. Please refer to primary team's examination for today.  [ ] Anxiety  [ ] Delirium [ ] Agitation [ ] Other    HEENT: Limited exam for patient safety and to limit infection risk during COVID-19 outbreak. Please refer to primary team's examination for today.  [ ]Normal   [ ]Dry mouth   [ ]ET Tube/Trach  [ ]Oral lesions    PULMONARY:  Limited exam for patient safety and to limit infection risk during COVID-19 outbreak. Please refer to primary team's examination for today.  [ ]Clear [ ]Tachypnea  [ ]Audible excessive secretions   [ ]Rhonchi        [ ]Right [ ]Left [ ]Bilateral  [ ]Crackles        [ ]Right [ ]Left [ ]Bilateral  [ ]Wheezing     [ ]Right [ ]Left [ ]Bilateral  [ ]Diminished breath sounds [ ]right [ ]left [ ]bilateral    CARDIOVASCULAR:  Limited exam for patient safety and to limit infection risk during COVID-19 outbreak. Please refer to primary team's examination for today.  [ ]Regular [ ]Irregular [ ]Tachy  [ ]Theodore [ ]Murmur [ ]Other    GASTROINTESTINAL: Limited exam for patient safety and to limit infection risk during COVID-19 outbreak. Please refer to primary team's examination for today.  [ ]Soft  [ ]Distended   [ ]+BS  [ ]Non tender [ ]Tender  [ ]PEG [ ]OGT/ NGT  Last BM:     GENITOURINARY:Limited exam for patient safety and to limit infection risk during COVID-19 outbreak. Please refer to primary team's examination for today.  [ ]Normal [ ] Incontinent   [ ]Oliguria/Anuria   [ ]Alejo    MUSCULOSKELETAL: Limited exam for patient safety and to limit infection risk during COVID-19 outbreak. Please refer to primary team's examination for today.  [ ]Normal   [ ]Weakness  [ ]Bed/Wheelchair bound [ ]Edema    NEUROLOGIC: Limited exam for patient safety and to limit infection risk during COVID-19 outbreak. Please refer to primary team's examination for today.  [ ]No focal deficits  [ ]Cognitive impairment  [ ]Dysphagia [ ]Dysarthria [ ]Paresis [ ]Other     SKIN: Limited exam for patient safety and to limit infection risk during COVID-19 outbreak. Please refer to primary team's examination for today.  [ ]Normal    [ ]Rash  [ ]Pressure ulcer(s)       Present on admission [ ]y [ ]n      CRITICAL CARE:  [ ] Shock Present  [ ]Septic [ ]Cardiogenic [ ]Neurologic [ ]Hypovolemic  [ ]  Vasopressors [ ]  Inotropes   [ ]Respiratory failure present [ ]Mechanical ventilation [ ]Non-invasive ventilatory support [ ]High flow  [ ]Acute  [ ]Chronic [ ]Hypoxic  [ ]Hypercarbic [ ]Other  [ ]Other organ failure     LABS: reviewed                        9.8    3.96  )-----------( 71       ( 25 Jan 2021 07:20 )             31.1   01-25    136  |  97  |  53<H>  ----------------------------<  137<H>  3.9   |  27  |  1.38<H>    Ca    9.2      25 Jan 2021 07:26  Mg     1.9     01-25    TPro  5.9<L>  /  Alb  3.3  /  TBili  1.4<H>  /  DBili  x   /  AST  51<H>  /  ALT  20  /  AlkPhos  58  01-24        RADIOLOGY & ADDITIONAL STUDIES:    X-ray abdomen 1/20/21  Nonobstructive bowel gas pattern.    PROTEIN CALORIE MALNUTRITION PRESENT: [ ]mild [ ]moderate [ ]severe [ ]underweight [ ]morbid obesity  https://www.andeal.org/vault/2440/web/files/ONC/Table_Clinical%20Characteristics%20to%20Document%20Malnutrition-White%20JV%20et%20al%202012.pdf    Height (cm): 157.5 (01-19-21 @ 12:33), 157.5 (12-20-20 @ 10:29)  Weight (kg): 57.3 (01-20-21 @ 08:40), 49.9 (12-20-20 @ 10:29)  BMI (kg/m2): 23.1 (01-20-21 @ 08:40), 20.1 (01-19-21 @ 12:33), 20.1 (12-20-20 @ 10:29)    [ ]PPSV2 < or = to 30% [ ]significant weight loss  [ ]poor nutritional intake  [ ]anasarca     Albumin, Serum: 3.3 g/dL (01-24-21 @ 07:17)   [ ]Artificial Nutrition      REFERRALS:   [ ]Chaplaincy  [ ]Hospice  [ ]Child Life  [ ]Social Work  [ ]Case management [ ]Holistic Therapy     Goals of Care Document:     ______________  Sanket Paul MD   of Geriatric and Palliative Medicine  [facility]     Please page the following number for clinical matters between the hours of 9AM and 5PM   from Monday through Friday : [phone]    After 5PM and on weekends, please page: [phone]. The Geriatric and Palliative Medicine consult service has 24/7 coverage for medical recommendations, including for symptom management needs.

## 2021-01-25 NOTE — DIETITIAN INITIAL EVALUATION ADULT. - PERTINENT LABORATORY DATA
BUN 53, Cr 1.38, serum glucose 137, eGFR 31 (01-)  A1c 5.7% (12-)  CAPILLARY BLOOD GLUCOSE  POCT Blood Glucose.: 162 mg/dL (25 Jan 2021 08:30)  POCT Blood Glucose.: 234 mg/dL (24 Jan 2021 21:30)  POCT Blood Glucose.: 147 mg/dL (24 Jan 2021 17:29)  POCT Blood Glucose.: 225 mg/dL (24 Jan 2021 12:24)

## 2021-01-25 NOTE — HOSPICE CARE NOTE - CONVESATION DETAILS
TC with the Pt's daughter Eve Durbin - Informed dtr of receipt of completed HCN Consents.     DME discussed - Pt has hospital bed, michelle, commode, and w/c already in the home.   Oxygen has been requested, and will be delivered today, by 5:30p. Dtr Eve aware, and in agreement.    Milrinone infusion will be d/c prior to d/c to home, family in agreement.    Plan is for d/c to home today w/HCN. Family provided w/HCN 24/7 contact information.

## 2021-01-25 NOTE — PROGRESS NOTE ADULT - SUBJECTIVE AND OBJECTIVE BOX
Patient is a 89y Female whom presented to the hospital with hyponatremia    PAST MEDICAL & SURGICAL HISTORY:  DM (diabetes mellitus)    CHF (congestive heart failure)    No significant past surgical history        MEDICATIONS  (STANDING):  dextrose 40% Gel 15 Gram(s) Oral once  dextrose 5%. 1000 milliLiter(s) (50 mL/Hr) IV Continuous <Continuous>  dextrose 5%. 1000 milliLiter(s) (100 mL/Hr) IV Continuous <Continuous>  dextrose 50% Injectable 25 Gram(s) IV Push once  dextrose 50% Injectable 12.5 Gram(s) IV Push once  dextrose 50% Injectable 25 Gram(s) IV Push once  furosemide   Injectable 20 milliGRAM(s) IV Push every 12 hours  glucagon  Injectable 1 milliGRAM(s) IntraMuscular once  heparin   Injectable 5000 Unit(s) SubCutaneous every 12 hours  insulin lispro (ADMELOG) corrective regimen sliding scale   SubCutaneous three times a day before meals  metoprolol succinate ER 25 milliGRAM(s) Oral daily  milrinone Infusion 0.125 MICROgram(s)/kG/Min (2.15 mL/Hr) IV Continuous <Continuous>      Allergies    No Known Allergies    Intolerances        SOCIAL HISTORY:  Denies ETOh,Smoking,     FAMILY HISTORY:  No pertinent family history in first degree relatives        REVIEW OF SYSTEMS:    unable to obtained a good review system                                                                                 9.8    3.96  )-----------( 71       ( 25 Jan 2021 07:20 )             31.1       CBC Full  -  ( 25 Jan 2021 07:20 )  WBC Count : 3.96 K/uL  RBC Count : 2.92 M/uL  Hemoglobin : 9.8 g/dL  Hematocrit : 31.1 %  Platelet Count - Automated : 71 K/uL  Mean Cell Volume : 106.5 fl  Mean Cell Hemoglobin : 33.6 pg  Mean Cell Hemoglobin Concentration : 31.5 gm/dL  Auto Neutrophil # : x  Auto Lymphocyte # : x  Auto Monocyte # : x  Auto Eosinophil # : x  Auto Basophil # : x  Auto Neutrophil % : x  Auto Lymphocyte % : x  Auto Monocyte % : x  Auto Eosinophil % : x  Auto Basophil % : x      01-25    136  |  97  |  53<H>  ----------------------------<  137<H>  3.9   |  27  |  1.38<H>    Ca    9.2      25 Jan 2021 07:26  Mg     1.9     01-25    TPro  5.9<L>  /  Alb  3.3  /  TBili  1.4<H>  /  DBili  x   /  AST  51<H>  /  ALT  20  /  AlkPhos  58  01-24      CAPILLARY BLOOD GLUCOSE      POCT Blood Glucose.: 138 mg/dL (25 Jan 2021 17:16)  POCT Blood Glucose.: 247 mg/dL (25 Jan 2021 12:52)  POCT Blood Glucose.: 162 mg/dL (25 Jan 2021 08:30)  POCT Blood Glucose.: 234 mg/dL (24 Jan 2021 21:30)      Vital Signs Last 24 Hrs  T(C): 36.8 (25 Jan 2021 20:14), Max: 36.9 (24 Jan 2021 21:08)  T(F): 98.3 (25 Jan 2021 20:14), Max: 98.5 (24 Jan 2021 21:08)  HR: 114 (25 Jan 2021 20:14) (100 - 114)  BP: 108/66 (25 Jan 2021 20:14) (100/69 - 123/69)  BP(mean): --  RR: 18 (25 Jan 2021 20:14) (18 - 18)  SpO2: 95% (25 Jan 2021 20:14) (95% - 100%)                    PHYSICAL EXAM:    Constitutional: NAD  HEENT: conjunctive   clear   Neck:  No JVD  Respiratory: decrease bs b/l   Cardiovascular: S1 and S2  Gastrointestinal: BS+, soft,   Extremities: pos  peripheral edema

## 2021-01-25 NOTE — DIETITIAN INITIAL EVALUATION ADULT. - ORAL INTAKE PTA/DIET HISTORY
Patient A&Ox2, Mandarin speaking. Patient states she was too tired to participate in interview. Per daughter, patient with fair to poor PO intake PTA. Daughter reports patient follows a vegetarian diet and monitors salt and fluid intake. No other known therapeutic restrictions. Daughter reports patient takes Glucerna or Nepro at home, as well as a multivitamin. Daughter confirms patient with NKFA.

## 2021-01-25 NOTE — DIETITIAN INITIAL EVALUATION ADULT. - CHIEF COMPLAINT
The patient is a 89y Female with h/o HTN, HLD, DM2, CKD III, HFrEF w/ severely reduced LVEF (20-25%), severe MR, and severe pHTN who was brought in by family on account of  dyspnea. On exam pt with decreased breath sounds b/l and b/l LE pitting edema. Labs notable for markedly elevated bnp, hyperkalemia, hyponatremia and thrombocytopenia. She is being admitted for acute on chronic systolic heart failure.

## 2021-01-25 NOTE — DIETITIAN INITIAL EVALUATION ADULT. - ADD RECOMMEND
3) Consider addition of multivitamin for micronutrient coverage as medically feasible 4) Encourage and assist with PO intake as needed 5) Monitor PO intake, weights, BM, skin integrity, labs

## 2021-01-25 NOTE — DIETITIAN INITIAL EVALUATION ADULT. - PROBLEM SELECTOR PLAN 1
- presenting with worsening dyspnea, LE edema, weight gain  - b[place] at [address]  - TTE done 12/2020 w/EF 20-25%  - on Torsemide 10mg po daily at home  - c/w IV diuresis w/ Lasix 20mg po q12  - c/w Toprol XL 25 mg po daily  - hold Lisinopril for hyperkalemia  - O2 supplementation as needed  - ACS unlikely ; per cath fellow no indication for emergent LHC at this time  - Cardiology consult ( Dr Uribe)

## 2021-01-25 NOTE — CONSULT NOTE ADULT - PROBLEM SELECTOR RECOMMENDATION 9
Full consult to follow shortly. Please page [phone] with any acute concerns. - continued care per cardiology while inpatient  - currently appears comfortable

## 2021-06-05 NOTE — ED ADULT NURSE NOTE - NS PRO AD PATIENT TYPE ON CHART
Do Not Resuscitate (DNR)/Medical Orders for Life-Sustaining Treatment (MOLST) Nausea, dizzy, left side of facial area swollen upon awakening today, now has right side of face swollen as per patient

## 2022-06-08 NOTE — H&P ADULT - PROBLEM/PLAN-6

## 2023-07-18 NOTE — ED ADULT NURSE NOTE - NSFALLRSKASSESSDT_ED_ALL_ED
20-Dec-2020 12:02 Xeljanz Counseling: I discussed with the patient the risks of Xeljanz therapy including increased risk of infection, liver issues, headache, diarrhea, or cold symptoms. Live vaccines should be avoided. They were instructed to call if they have any problems.

## 2023-11-02 NOTE — PHYSICAL THERAPY INITIAL EVALUATION ADULT - TRANSFER SKILLS, REHAB EVAL
RICHMOND score risk calculation for patients with Pulmonary Embolism    Predictor Variable Yes/ no points Points   Systolic BP  mm Hg       BP <90 call PERT. BP >100 score = 0 Yes +2   No +0 0   Cardiac troponin elevation    Yes +2   No +0 0   RV dysfunction (echo/CT scan)    Yes +2   No +0 2, considering increased  RV/LV ratio   Heart rate ? 110/min    Yes +1  No +0 0        Total   2       Interpretation    Richmond Score   Stage   0-2   I (Low risk)   3-4  II (Intermediate risk)   >4  III (High risk)               RICHMOND Score I without Hypotension, shock, syncope or clot in transit: ICU-team notified.    Srinath Bajwa MD  Internal Medicine, PGY1  11/1/2023 11:19 PM  Pager: [phone], [phone]     needed assist/needs device

## 2025-04-03 NOTE — DISCHARGE NOTE PROVIDER - NSRESEARCHGRANT_HIDDEN_GEN_A_CORE
Is This A New Presentation, Or A Follow-Up?: Rash
Additional History: The patient reports suspected Seborrheic dermatitis on the scalp. She travels between [place] and [place] for college and thinks the change in water causes her rash to flare. She has started using Head and Shoulders shampoo with moderate improvement.
Yes